# Patient Record
Sex: FEMALE | Race: WHITE | Employment: OTHER | URBAN - METROPOLITAN AREA
[De-identification: names, ages, dates, MRNs, and addresses within clinical notes are randomized per-mention and may not be internally consistent; named-entity substitution may affect disease eponyms.]

---

## 2017-05-16 ENCOUNTER — HOSPITAL ENCOUNTER (OUTPATIENT)
Dept: MAMMOGRAPHY | Age: 77
Discharge: HOME | End: 2017-05-16
Attending: NURSE PRACTITIONER | Admitting: NURSE PRACTITIONER

## 2017-05-16 DIAGNOSIS — C50.911 MALIGNANT NEOPLASM OF RIGHT FEMALE BREAST, UNSPECIFIED SITE OF BREAST: ICD-10-CM

## 2017-10-03 ENCOUNTER — HOSPITAL ENCOUNTER (OUTPATIENT)
Dept: NUCLEAR MEDICINE | Age: 77
Discharge: HOME | End: 2017-10-03
Attending: NURSE PRACTITIONER | Admitting: NURSE PRACTITIONER

## 2017-10-03 DIAGNOSIS — R06.02 SHORTNESS OF BREATH: ICD-10-CM

## 2017-10-03 DIAGNOSIS — R79.89 POSITIVE D DIMER: ICD-10-CM

## 2017-10-03 DIAGNOSIS — R79.89 OTHER SPECIFIED ABNORMAL FINDINGS OF BLOOD CHEMISTRY: ICD-10-CM

## 2017-10-03 DIAGNOSIS — R00.2 HEART PALPITATIONS: ICD-10-CM

## 2017-10-03 RX ADMIN — Medication 22.2 MILLICURIE: at 13:38

## 2017-10-03 RX ADMIN — Medication 5.3 MILLICURIE: at 13:38

## 2018-01-25 ENCOUNTER — OFFICE VISIT (OUTPATIENT)
Dept: ORTHOPEDIC SURGERY | Age: 78
End: 2018-01-25

## 2018-01-25 VITALS
HEIGHT: 62 IN | HEART RATE: 66 BPM | DIASTOLIC BLOOD PRESSURE: 100 MMHG | WEIGHT: 158 LBS | SYSTOLIC BLOOD PRESSURE: 152 MMHG | BODY MASS INDEX: 29.08 KG/M2

## 2018-01-25 DIAGNOSIS — R52 PAIN: ICD-10-CM

## 2018-01-25 DIAGNOSIS — M17.0 PRIMARY OSTEOARTHRITIS OF BOTH KNEES: Primary | ICD-10-CM

## 2018-01-25 PROBLEM — M17.12 PRIMARY OSTEOARTHRITIS OF LEFT KNEE: Status: ACTIVE | Noted: 2018-01-25

## 2018-01-25 PROCEDURE — 4040F PNEUMOC VAC/ADMIN/RCVD: CPT | Performed by: ORTHOPAEDIC SURGERY

## 2018-01-25 PROCEDURE — 1036F TOBACCO NON-USER: CPT | Performed by: ORTHOPAEDIC SURGERY

## 2018-01-25 PROCEDURE — 99203 OFFICE O/P NEW LOW 30 MIN: CPT | Performed by: ORTHOPAEDIC SURGERY

## 2018-01-25 PROCEDURE — G8419 CALC BMI OUT NRM PARAM NOF/U: HCPCS | Performed by: ORTHOPAEDIC SURGERY

## 2018-01-25 PROCEDURE — G8427 DOCREV CUR MEDS BY ELIG CLIN: HCPCS | Performed by: ORTHOPAEDIC SURGERY

## 2018-01-25 PROCEDURE — 1123F ACP DISCUSS/DSCN MKR DOCD: CPT | Performed by: ORTHOPAEDIC SURGERY

## 2018-01-25 PROCEDURE — G8400 PT W/DXA NO RESULTS DOC: HCPCS | Performed by: ORTHOPAEDIC SURGERY

## 2018-01-25 PROCEDURE — 1090F PRES/ABSN URINE INCON ASSESS: CPT | Performed by: ORTHOPAEDIC SURGERY

## 2018-01-25 PROCEDURE — G8484 FLU IMMUNIZE NO ADMIN: HCPCS | Performed by: ORTHOPAEDIC SURGERY

## 2018-01-25 RX ORDER — ESCITALOPRAM OXALATE 10 MG/1
10 TABLET ORAL
Status: ON HOLD | COMMUNITY
Start: 2017-12-04 | End: 2019-04-30

## 2018-01-25 RX ORDER — IBUPROFEN 200 MG
TABLET ORAL
COMMUNITY
End: 2019-04-24

## 2018-01-25 RX ORDER — TRAZODONE HYDROCHLORIDE 100 MG/1
100 TABLET ORAL NIGHTLY
COMMUNITY

## 2018-01-25 RX ORDER — LORAZEPAM 0.5 MG/1
0.5 TABLET ORAL
COMMUNITY
Start: 2017-11-30

## 2018-01-25 RX ORDER — FLUTICASONE PROPIONATE 50 MCG
2 SPRAY, SUSPENSION (ML) NASAL
COMMUNITY
Start: 2017-07-14

## 2018-01-25 RX ORDER — PROPRANOLOL HYDROCHLORIDE 10 MG/1
TABLET ORAL
COMMUNITY
Start: 2018-01-19

## 2018-01-25 RX ORDER — ASPIRIN 81 MG/1
81 TABLET ORAL
Status: ON HOLD | COMMUNITY
End: 2019-05-01 | Stop reason: HOSPADM

## 2018-01-25 RX ORDER — CETIRIZINE HYDROCHLORIDE 10 MG/1
TABLET ORAL
COMMUNITY
Start: 2017-08-15

## 2018-01-25 RX ORDER — ALENDRONATE SODIUM 70 MG/1
70 TABLET ORAL
COMMUNITY
Start: 2017-07-11 | End: 2019-04-24

## 2018-01-25 RX ORDER — LISINOPRIL AND HYDROCHLOROTHIAZIDE 12.5; 1 MG/1; MG/1
TABLET ORAL
Status: ON HOLD | COMMUNITY
Start: 2017-12-29 | End: 2019-05-02 | Stop reason: HOSPADM

## 2018-01-25 NOTE — PROGRESS NOTES
noted.  No fractures are identified. Impression:  Encounter Diagnoses   Name Primary?  Pain Yes    Primary osteoarthritis of left knee        Office Procedures:  Orders Placed This Encounter   Procedures    XR KNEE LEFT (MIN 4 VIEWS)     [id number]     Order Specific Question:   Reason for exam:     Answer:   Pain       Treatment Plan:  I discussed with the patient the nature of osteoarthritis of the knee. We talked about treatment of arthritis and the various options that are involved with this. The patient understands that the treatments can vary from essentially doing nothing to a total joint replacement arthroplasty for arthritis. I then went on to describe the utilization of glucosamine and chondroitin sulfate as a joint nutrition product. We talked about the fact that this is essentially a joint vitamin with typically minimal side effects. We also talked about utilization of prescription over-the-counter anti-inflammatory medications as the next option. We also discussed the possibility of brace wear or orthotic wear if the patient has significant varus alignment. We then went on to discuss the possibility of Visco supplementation with hyaluronate products. We talked about the typical course of this type of treatment and the fact that often times in the treatment for significant arthritis, this is successful less than half the time. We also talked about the corticosteroid injections and the fact that this can give a brief window of relief, but does not cure the problem; in fact, the pain often has a rebound effect in 6-10 weeks after the steroid has worn off. We also discussed arthroscopy surgery in attempts to debride the joint, but the fact that this is relatively unreliable treatment in the face of significant arthritis. It can occasionally be used, particularly if there is significant meniscus pathology.  Lastly we discussed total joint replacement arthroplasty as the final and definitive step in

## 2018-01-26 ENCOUNTER — TELEPHONE (OUTPATIENT)
Dept: ORTHOPEDIC SURGERY | Age: 78
End: 2018-01-26

## 2018-02-01 ENCOUNTER — NURSE ONLY (OUTPATIENT)
Dept: ORTHOPEDIC SURGERY | Age: 78
End: 2018-02-01

## 2018-02-01 DIAGNOSIS — M17.0 PRIMARY OSTEOARTHRITIS OF BOTH KNEES: Primary | ICD-10-CM

## 2018-02-01 DIAGNOSIS — R52 PAIN: ICD-10-CM

## 2018-02-01 PROCEDURE — 20611 DRAIN/INJ JOINT/BURSA W/US: CPT | Performed by: PHYSICIAN ASSISTANT

## 2018-02-01 NOTE — PROGRESS NOTES
Subjective:      Patient ID: Helen Baez is a 68 y.o. female.     HPI    Review of Systems    Objective:   Physical Exam    Assessment:      ***      Plan:      ***

## 2018-02-01 NOTE — PROGRESS NOTES
2/1/18 4:03 PM         NDC: 93896772476    Lot Number: [id number]    Comments: 2ML X 2 RT AND LT KNEE EUFLEXXA  EXP 11/26/18

## 2018-02-01 NOTE — PROGRESS NOTES
Diagnosis: Left and right knee osteoarthritis. Procedure: Left and right knee Visco supplementation #1    The patient is symptomatic from osteoarthritis of the left and right knee joint with documented radiological signs of arthritis. The patient has also failed 3 months of conservative treatment including home exercise, education, Tylenol and/or NSAIDs use. The patient was offered a Visco supplementation today. Risks, benefits, and alternatives to the injections were discussed in detail with the patient. The risks discussed included but are not limited to infection, skin reactions, hot swollen joints, and anaphylaxis. The patient gave verbal informed consent for the injection. The patient's skin was prepped with  3 sterile gauze  pads soaked with alcohol solution and the knee joint was injected with 2 mL of Euflexxa intra-articularly under sterile conditions. Technique: Under sterile conditions a [employer] ultrasound unit with a variable frequency (6.0-15.0 MHz) linear transducer was used to localize the placement of a 22-gauge needle into the knee joint. Findings: Successful needle placement for intra-articular Visco supplementation injection. Final images were taken and saved for permanent record. The patient tolerated the injection reasonably well. The patient was given instructions to ice the kne and avoid strenuous activities for 24-48 hours. The patient was instructed to call the office immediately if there is increased pain, redness, warmth, fever, or chills. We will see the patient back in one week for their second injection.

## 2018-02-08 ENCOUNTER — NURSE ONLY (OUTPATIENT)
Dept: ORTHOPEDIC SURGERY | Age: 78
End: 2018-02-08

## 2018-02-08 DIAGNOSIS — M17.0 PRIMARY OSTEOARTHRITIS OF BOTH KNEES: Primary | ICD-10-CM

## 2018-02-08 PROCEDURE — 20611 DRAIN/INJ JOINT/BURSA W/US: CPT | Performed by: PHYSICIAN ASSISTANT

## 2018-02-08 NOTE — PROGRESS NOTES
2/8/18 4:04 PM         NDC: [id number]    Lot Number: [id number]    Comments: 2ML X 2 RT AND LT KNEE EUFLEXXA EXP 12/2/18

## 2018-02-08 NOTE — PROGRESS NOTES
Diagnosis: Left and right knee osteoarthritis. Procedure: Left and right knee Visco supplementation injection 2    The patient returns today for their second Euflexxa injection in the left and right knee. The risks, benefits, and complications of the injections were again discussed in detail with the patient. The risks discussed included but are not limited to infection, skin reactions, hot swollen joints, and anaphylaxis. The patient gave verbal informed consent for the injection. The patient skin was prepped with 3 sterile gauze pads soaked with alcohol solution and the knee joint was injected with 2 mL of Euflexxa intra-articularly under sterile conditions . Technique: Under sterile conditions a [employer] ultrasound unit with a variable frequency (6.0-15.0 MHz) linear transducer was used to localize the placement of a 22-gauge needle into the laith joint. Findings: Successful needle placement for intra-articular Visco supplementation injection. Final images were taken and saved for permanent record. The patient tolerated the injection reasonably well. The patient was given instructions to ice the the knee and avoid strenuous activities for 24-48 hours. The patient was instructed to call the office immediately if there is increased pain, redness, warmth, fever, or chills. We will see the patient back in one week for the third injection.

## 2018-02-15 ENCOUNTER — NURSE ONLY (OUTPATIENT)
Dept: ORTHOPEDIC SURGERY | Age: 78
End: 2018-02-15

## 2018-02-15 DIAGNOSIS — M17.0 PRIMARY OSTEOARTHRITIS OF BOTH KNEES: Primary | ICD-10-CM

## 2018-02-15 PROCEDURE — 20611 DRAIN/INJ JOINT/BURSA W/US: CPT | Performed by: PHYSICIAN ASSISTANT

## 2018-03-14 RX ORDER — MELOXICAM 15 MG/1
TABLET ORAL
Qty: 30 TABLET | Refills: 3 | Status: SHIPPED | OUTPATIENT
Start: 2018-03-14 | End: 2018-07-25 | Stop reason: SDUPTHER

## 2018-04-11 ENCOUNTER — OFFICE VISIT (OUTPATIENT)
Dept: ORTHOPEDIC SURGERY | Age: 78
End: 2018-04-11

## 2018-04-11 VITALS — WEIGHT: 160 LBS | HEIGHT: 63 IN | BODY MASS INDEX: 28.35 KG/M2

## 2018-04-11 DIAGNOSIS — R52 PAIN: Primary | ICD-10-CM

## 2018-04-11 DIAGNOSIS — M19.012 PRIMARY OSTEOARTHRITIS OF LEFT SHOULDER: ICD-10-CM

## 2018-04-11 PROCEDURE — G8428 CUR MEDS NOT DOCUMENT: HCPCS | Performed by: PHYSICIAN ASSISTANT

## 2018-04-11 PROCEDURE — 1123F ACP DISCUSS/DSCN MKR DOCD: CPT | Performed by: PHYSICIAN ASSISTANT

## 2018-04-11 PROCEDURE — 1090F PRES/ABSN URINE INCON ASSESS: CPT | Performed by: PHYSICIAN ASSISTANT

## 2018-04-11 PROCEDURE — 99213 OFFICE O/P EST LOW 20 MIN: CPT | Performed by: PHYSICIAN ASSISTANT

## 2018-04-11 PROCEDURE — G8419 CALC BMI OUT NRM PARAM NOF/U: HCPCS | Performed by: PHYSICIAN ASSISTANT

## 2018-04-11 PROCEDURE — 1036F TOBACCO NON-USER: CPT | Performed by: PHYSICIAN ASSISTANT

## 2018-04-11 PROCEDURE — 4040F PNEUMOC VAC/ADMIN/RCVD: CPT | Performed by: PHYSICIAN ASSISTANT

## 2018-04-11 PROCEDURE — G8400 PT W/DXA NO RESULTS DOC: HCPCS | Performed by: PHYSICIAN ASSISTANT

## 2018-05-17 ENCOUNTER — HOSPITAL ENCOUNTER (OUTPATIENT)
Dept: MAMMOGRAPHY | Age: 78
Discharge: HOME | End: 2018-05-17
Attending: NURSE PRACTITIONER | Admitting: NURSE PRACTITIONER

## 2018-05-17 DIAGNOSIS — Z85.3 PERSONAL HISTORY OF MALIGNANT NEOPLASM OF BREAST: ICD-10-CM

## 2018-07-25 DIAGNOSIS — M17.0 PRIMARY OSTEOARTHRITIS OF BOTH KNEES: Primary | ICD-10-CM

## 2018-07-25 RX ORDER — MELOXICAM 15 MG/1
TABLET ORAL
Qty: 90 TABLET | Refills: 0 | Status: SHIPPED | OUTPATIENT
Start: 2018-07-25 | End: 2018-10-20 | Stop reason: SDUPTHER

## 2018-10-20 DIAGNOSIS — M17.0 PRIMARY OSTEOARTHRITIS OF BOTH KNEES: ICD-10-CM

## 2018-10-22 RX ORDER — MELOXICAM 15 MG/1
TABLET ORAL
Qty: 90 TABLET | Refills: 0 | Status: SHIPPED | OUTPATIENT
Start: 2018-10-22 | End: 2019-02-18 | Stop reason: SDUPTHER

## 2019-02-07 ENCOUNTER — OFFICE VISIT (OUTPATIENT)
Dept: ORTHOPEDIC SURGERY | Age: 79
End: 2019-02-07
Payer: MEDICARE

## 2019-02-07 VITALS
DIASTOLIC BLOOD PRESSURE: 94 MMHG | BODY MASS INDEX: 28.35 KG/M2 | WEIGHT: 160 LBS | HEART RATE: 78 BPM | HEIGHT: 63 IN | SYSTOLIC BLOOD PRESSURE: 163 MMHG

## 2019-02-07 DIAGNOSIS — M17.12 PRIMARY OSTEOARTHRITIS OF LEFT KNEE: Primary | ICD-10-CM

## 2019-02-07 DIAGNOSIS — M25.562 LEFT KNEE PAIN, UNSPECIFIED CHRONICITY: ICD-10-CM

## 2019-02-07 PROCEDURE — 1036F TOBACCO NON-USER: CPT | Performed by: ORTHOPAEDIC SURGERY

## 2019-02-07 PROCEDURE — G8484 FLU IMMUNIZE NO ADMIN: HCPCS | Performed by: ORTHOPAEDIC SURGERY

## 2019-02-07 PROCEDURE — 1123F ACP DISCUSS/DSCN MKR DOCD: CPT | Performed by: ORTHOPAEDIC SURGERY

## 2019-02-07 PROCEDURE — L1830 KO IMMOB CANVAS LONG PRE OTS: HCPCS | Performed by: ORTHOPAEDIC SURGERY

## 2019-02-07 PROCEDURE — 1090F PRES/ABSN URINE INCON ASSESS: CPT | Performed by: ORTHOPAEDIC SURGERY

## 2019-02-07 PROCEDURE — G8400 PT W/DXA NO RESULTS DOC: HCPCS | Performed by: ORTHOPAEDIC SURGERY

## 2019-02-07 PROCEDURE — 1101F PT FALLS ASSESS-DOCD LE1/YR: CPT | Performed by: ORTHOPAEDIC SURGERY

## 2019-02-07 PROCEDURE — G8427 DOCREV CUR MEDS BY ELIG CLIN: HCPCS | Performed by: ORTHOPAEDIC SURGERY

## 2019-02-07 PROCEDURE — 99214 OFFICE O/P EST MOD 30 MIN: CPT | Performed by: ORTHOPAEDIC SURGERY

## 2019-02-07 PROCEDURE — 4040F PNEUMOC VAC/ADMIN/RCVD: CPT | Performed by: ORTHOPAEDIC SURGERY

## 2019-02-07 PROCEDURE — G8419 CALC BMI OUT NRM PARAM NOF/U: HCPCS | Performed by: ORTHOPAEDIC SURGERY

## 2019-02-12 ENCOUNTER — HOSPITAL ENCOUNTER (OUTPATIENT)
Dept: PHYSICAL THERAPY | Age: 79
Setting detail: THERAPIES SERIES
Discharge: HOME | End: 2019-02-12
Payer: MEDICARE

## 2019-02-12 PROCEDURE — G8978 MOBILITY CURRENT STATUS: HCPCS

## 2019-02-12 PROCEDURE — 97110 THERAPEUTIC EXERCISES: CPT

## 2019-02-12 PROCEDURE — G8979 MOBILITY GOAL STATUS: HCPCS

## 2019-02-12 PROCEDURE — 97161 PT EVAL LOW COMPLEX 20 MIN: CPT

## 2019-02-18 DIAGNOSIS — M17.0 PRIMARY OSTEOARTHRITIS OF BOTH KNEES: ICD-10-CM

## 2019-02-21 RX ORDER — MELOXICAM 15 MG/1
TABLET ORAL
Qty: 90 TABLET | Refills: 0 | Status: ON HOLD | OUTPATIENT
Start: 2019-02-21 | End: 2019-05-01 | Stop reason: HOSPADM

## 2019-02-25 DIAGNOSIS — M25.562 LEFT KNEE PAIN, UNSPECIFIED CHRONICITY: Primary | ICD-10-CM

## 2019-04-04 ENCOUNTER — TELEPHONE (OUTPATIENT)
Dept: ORTHOPEDIC SURGERY | Age: 79
End: 2019-04-04

## 2019-04-11 ENCOUNTER — HOSPITAL ENCOUNTER (OUTPATIENT)
Dept: PREADMISSION TESTING | Age: 79
Discharge: HOME | End: 2019-04-15
Payer: MEDICARE

## 2019-04-11 LAB
ABO/RH: NORMAL
ALBUMIN SERPL-MCNC: 4.5 G/DL (ref 3.4–5)
ANION GAP SERPL CALCULATED.3IONS-SCNC: 11 MMOL/L (ref 3–16)
ANTIBODY SCREEN: NORMAL
APTT: 28.8 SEC (ref 26–36)
BASOPHILS ABSOLUTE: 0 K/UL (ref 0–0.2)
BASOPHILS RELATIVE PERCENT: 1 %
BILIRUBIN URINE: NEGATIVE
BLOOD, URINE: NEGATIVE
BUN BLDV-MCNC: 14 MG/DL (ref 7–20)
CALCIUM SERPL-MCNC: 9.7 MG/DL (ref 8.3–10.6)
CHLORIDE BLD-SCNC: 95 MMOL/L (ref 99–110)
CLARITY: CLEAR
CO2: 30 MMOL/L (ref 21–32)
COLOR: YELLOW
CREAT SERPL-MCNC: 0.7 MG/DL (ref 0.6–1.2)
EKG ATRIAL RATE: 81 BPM
EKG DIAGNOSIS: NORMAL
EKG P AXIS: 37 DEGREES
EKG P-R INTERVAL: 168 MS
EKG Q-T INTERVAL: 394 MS
EKG QRS DURATION: 84 MS
EKG QTC CALCULATION (BAZETT): 457 MS
EKG R AXIS: -32 DEGREES
EKG T AXIS: 2 DEGREES
EKG VENTRICULAR RATE: 81 BPM
EOSINOPHILS ABSOLUTE: 0.1 K/UL (ref 0–0.6)
EOSINOPHILS RELATIVE PERCENT: 2.6 %
GFR AFRICAN AMERICAN: >60
GFR NON-AFRICAN AMERICAN: >60
GLUCOSE BLD-MCNC: 119 MG/DL (ref 70–99)
GLUCOSE URINE: NEGATIVE MG/DL
HCT VFR BLD CALC: 40.7 % (ref 36–48)
HEMOGLOBIN: 13.5 G/DL (ref 12–16)
INR BLD: 1.01 (ref 0.86–1.14)
KETONES, URINE: NEGATIVE MG/DL
LEUKOCYTE ESTERASE, URINE: NEGATIVE
LYMPHOCYTES ABSOLUTE: 1 K/UL (ref 1–5.1)
LYMPHOCYTES RELATIVE PERCENT: 18.7 %
MCH RBC QN AUTO: 30.1 PG (ref 26–34)
MCHC RBC AUTO-ENTMCNC: 33.2 G/DL (ref 31–36)
MCV RBC AUTO: 90.7 FL (ref 80–100)
MICROSCOPIC EXAMINATION: NORMAL
MONOCYTES ABSOLUTE: 0.5 K/UL (ref 0–1.3)
MONOCYTES RELATIVE PERCENT: 10.2 %
NEUTROPHILS ABSOLUTE: 3.5 K/UL (ref 1.7–7.7)
NEUTROPHILS RELATIVE PERCENT: 67.5 %
NITRITE, URINE: NEGATIVE
PDW BLD-RTO: 14.1 % (ref 12.4–15.4)
PH UA: 7.5 (ref 5–8)
PLATELET # BLD: 292 K/UL (ref 135–450)
PMV BLD AUTO: 8.1 FL (ref 5–10.5)
POTASSIUM SERPL-SCNC: 4.2 MMOL/L (ref 3.5–5.1)
PROTEIN UA: NEGATIVE MG/DL
PROTHROMBIN TIME: 11.5 SEC (ref 9.8–13)
RBC # BLD: 4.48 M/UL (ref 4–5.2)
SODIUM BLD-SCNC: 136 MMOL/L (ref 136–145)
SPECIFIC GRAVITY UA: 1.01 (ref 1–1.03)
TRANSFERRIN: 346 MG/DL (ref 200–360)
URINE TYPE: NORMAL
UROBILINOGEN, URINE: 0.2 E.U./DL
WBC # BLD: 5.1 K/UL (ref 4–11)

## 2019-04-11 PROCEDURE — 82040 ASSAY OF SERUM ALBUMIN: CPT

## 2019-04-11 PROCEDURE — 83036 HEMOGLOBIN GLYCOSYLATED A1C: CPT

## 2019-04-11 PROCEDURE — 86900 BLOOD TYPING SEROLOGIC ABO: CPT

## 2019-04-11 PROCEDURE — 84466 ASSAY OF TRANSFERRIN: CPT

## 2019-04-11 PROCEDURE — 81003 URINALYSIS AUTO W/O SCOPE: CPT

## 2019-04-11 PROCEDURE — 36415 COLL VENOUS BLD VENIPUNCTURE: CPT

## 2019-04-11 PROCEDURE — 85025 COMPLETE CBC W/AUTO DIFF WBC: CPT

## 2019-04-11 PROCEDURE — 93005 ELECTROCARDIOGRAM TRACING: CPT

## 2019-04-11 PROCEDURE — 85730 THROMBOPLASTIN TIME PARTIAL: CPT

## 2019-04-11 PROCEDURE — 86850 RBC ANTIBODY SCREEN: CPT

## 2019-04-11 PROCEDURE — 87086 URINE CULTURE/COLONY COUNT: CPT

## 2019-04-11 PROCEDURE — 86901 BLOOD TYPING SEROLOGIC RH(D): CPT

## 2019-04-11 PROCEDURE — 85610 PROTHROMBIN TIME: CPT

## 2019-04-11 PROCEDURE — 80048 BASIC METABOLIC PNL TOTAL CA: CPT

## 2019-04-12 LAB
ESTIMATED AVERAGE GLUCOSE: 137 MG/DL
HBA1C MFR BLD: 6.4 %
URINE CULTURE, ROUTINE: NORMAL

## 2019-04-16 ENCOUNTER — HOSPITAL ENCOUNTER (OUTPATIENT)
Dept: CT IMAGING | Age: 79
Discharge: HOME | End: 2019-04-16
Payer: MEDICARE

## 2019-04-16 ENCOUNTER — TELEPHONE (OUTPATIENT)
Dept: ORTHOPEDIC SURGERY | Age: 79
End: 2019-04-16

## 2019-04-16 DIAGNOSIS — M25.562 LEFT KNEE PAIN, UNSPECIFIED CHRONICITY: ICD-10-CM

## 2019-04-16 PROCEDURE — 73700 CT LOWER EXTREMITY W/O DYE: CPT

## 2019-04-24 NOTE — PROGRESS NOTES
Obstructive Sleep Apnea (BETHANY) Screening     Patient:  Isauro Harris    YOB: 1940      Medical Record #:  [id number]                     Date:  4/24/2019     1. Are you a loud and/or regular snorer? [x]  Yes       [] No    2. Have you been observed to gasp or stop breathing during sleep? []  Yes       [x] No    3. Do you feel tired or groggy upon awakening or do you awaken with a headache?           []  Yes       [x] No    4. Are you often tired or fatigued during the wake time hours? [x]  Yes       [] No    5. Do you fall asleep sitting, reading, watching TV or driving? [x]  Yes       [] No    6. Do you often have problems with memory or concentration? []  Yes       [x] No    **If patient's score is ? 3 they are considered high risk for BETHANY. Notify the anesthesiologist of the high risk and document in focus note. Note:  If the patient's BMI is more than 35 kg m¯² , has neck circumference > 40 cm, and/or high blood pressure the risk is greater (© American [employer]nea Association, 2006).

## 2019-04-28 ENCOUNTER — ANESTHESIA EVENT (OUTPATIENT)
Dept: OPERATING ROOM | Age: 79
End: 2019-04-28
Payer: MEDICARE

## 2019-04-30 ENCOUNTER — HOSPITAL ENCOUNTER (OUTPATIENT)
Age: 79
Discharge: HOME | End: 2019-05-02
Attending: ORTHOPAEDIC SURGERY | Admitting: ORTHOPAEDIC SURGERY
Payer: MEDICARE

## 2019-04-30 ENCOUNTER — ANESTHESIA (OUTPATIENT)
Dept: OPERATING ROOM | Age: 79
End: 2019-04-30
Payer: MEDICARE

## 2019-04-30 VITALS — DIASTOLIC BLOOD PRESSURE: 58 MMHG | OXYGEN SATURATION: 79 % | SYSTOLIC BLOOD PRESSURE: 92 MMHG

## 2019-04-30 DIAGNOSIS — M17.12 PRIMARY OSTEOARTHRITIS OF LEFT KNEE: Primary | ICD-10-CM

## 2019-04-30 DIAGNOSIS — Z96.652 STATUS POST TOTAL KNEE REPLACEMENT NOT USING CEMENT, LEFT: ICD-10-CM

## 2019-04-30 LAB
ABO/RH: NORMAL
ANTIBODY SCREEN: NORMAL
GLUCOSE BLD-MCNC: 134 MG/DL (ref 70–99)
GLUCOSE BLD-MCNC: 140 MG/DL (ref 70–99)
PERFORMED ON: ABNORMAL
PERFORMED ON: ABNORMAL

## 2019-04-30 PROCEDURE — 88305 TISSUE EXAM BY PATHOLOGIST: CPT

## 2019-04-30 PROCEDURE — 86900 BLOOD TYPING SEROLOGIC ABO: CPT

## 2019-04-30 PROCEDURE — C1713 ANCHOR/SCREW BN/BN,TIS/BN: HCPCS | Performed by: ORTHOPAEDIC SURGERY

## 2019-04-30 PROCEDURE — 6360000002 HC RX W HCPCS: Performed by: ANESTHESIOLOGY

## 2019-04-30 PROCEDURE — 97166 OT EVAL MOD COMPLEX 45 MIN: CPT

## 2019-04-30 PROCEDURE — 6360000002 HC RX W HCPCS: Performed by: NURSE ANESTHETIST, CERTIFIED REGISTERED

## 2019-04-30 PROCEDURE — 2580000003 HC RX 258: Performed by: PHYSICIAN ASSISTANT

## 2019-04-30 PROCEDURE — 64447 NJX AA&/STRD FEMORAL NRV IMG: CPT | Performed by: ANESTHESIOLOGY

## 2019-04-30 PROCEDURE — 6360000002 HC RX W HCPCS: Performed by: ORTHOPAEDIC SURGERY

## 2019-04-30 PROCEDURE — 2500000003 HC RX 250 WO HCPCS: Performed by: NURSE ANESTHETIST, CERTIFIED REGISTERED

## 2019-04-30 PROCEDURE — 6360000002 HC RX W HCPCS: Performed by: PHYSICIAN ASSISTANT

## 2019-04-30 PROCEDURE — C1776 JOINT DEVICE (IMPLANTABLE): HCPCS | Performed by: ORTHOPAEDIC SURGERY

## 2019-04-30 PROCEDURE — 2580000003 HC RX 258: Performed by: ANESTHESIOLOGY

## 2019-04-30 PROCEDURE — 6370000000 HC RX 637 (ALT 250 FOR IP): Performed by: PHYSICIAN ASSISTANT

## 2019-04-30 PROCEDURE — 97116 GAIT TRAINING THERAPY: CPT

## 2019-04-30 PROCEDURE — 3600000015 HC SURGERY LEVEL 5 ADDTL 15MIN: Performed by: ORTHOPAEDIC SURGERY

## 2019-04-30 PROCEDURE — 86901 BLOOD TYPING SEROLOGIC RH(D): CPT

## 2019-04-30 PROCEDURE — 3700000001 HC ADD 15 MINUTES (ANESTHESIA): Performed by: ORTHOPAEDIC SURGERY

## 2019-04-30 PROCEDURE — 2500000003 HC RX 250 WO HCPCS: Performed by: ORTHOPAEDIC SURGERY

## 2019-04-30 PROCEDURE — 97535 SELF CARE MNGMENT TRAINING: CPT

## 2019-04-30 PROCEDURE — 1200000000 HC SEMI PRIVATE

## 2019-04-30 PROCEDURE — 2500000003 HC RX 250 WO HCPCS: Performed by: PHYSICIAN ASSISTANT

## 2019-04-30 PROCEDURE — 6370000000 HC RX 637 (ALT 250 FOR IP): Performed by: ANESTHESIOLOGY

## 2019-04-30 PROCEDURE — 94761 N-INVAS EAR/PLS OXIMETRY MLT: CPT

## 2019-04-30 PROCEDURE — 7100000000 HC PACU RECOVERY - FIRST 15 MIN: Performed by: ORTHOPAEDIC SURGERY

## 2019-04-30 PROCEDURE — 2700000000 HC OXYGEN THERAPY PER DAY

## 2019-04-30 PROCEDURE — 97530 THERAPEUTIC ACTIVITIES: CPT

## 2019-04-30 PROCEDURE — 97162 PT EVAL MOD COMPLEX 30 MIN: CPT

## 2019-04-30 PROCEDURE — 97110 THERAPEUTIC EXERCISES: CPT

## 2019-04-30 PROCEDURE — 2720000010 HC SURG SUPPLY STERILE: Performed by: ORTHOPAEDIC SURGERY

## 2019-04-30 PROCEDURE — 2580000003 HC RX 258: Performed by: ORTHOPAEDIC SURGERY

## 2019-04-30 PROCEDURE — 7100000001 HC PACU RECOVERY - ADDTL 15 MIN: Performed by: ORTHOPAEDIC SURGERY

## 2019-04-30 PROCEDURE — 88311 DECALCIFY TISSUE: CPT

## 2019-04-30 PROCEDURE — 86850 RBC ANTIBODY SCREEN: CPT

## 2019-04-30 PROCEDURE — C9290 INJ, BUPIVACAINE LIPOSOME: HCPCS | Performed by: ORTHOPAEDIC SURGERY

## 2019-04-30 PROCEDURE — 2709999900 HC NON-CHARGEABLE SUPPLY: Performed by: ORTHOPAEDIC SURGERY

## 2019-04-30 PROCEDURE — 3600000005 HC SURGERY LEVEL 5 BASE: Performed by: ORTHOPAEDIC SURGERY

## 2019-04-30 PROCEDURE — 3700000000 HC ANESTHESIA ATTENDED CARE: Performed by: ORTHOPAEDIC SURGERY

## 2019-04-30 DEVICE — INSERT TIB BEAR SZ 4 THK11MM KNEE X3 CNDYL STABILIZING: Type: IMPLANTABLE DEVICE | Site: KNEE | Status: FUNCTIONAL

## 2019-04-30 DEVICE — CEMENT BNE 20ML 41GM FULL DOSE PMMA W/ TOBRA M VISC RADPQ: Type: IMPLANTABLE DEVICE | Site: KNEE | Status: FUNCTIONAL

## 2019-04-30 DEVICE — BASEPLATE TIB SZ 4 KNEE TRITANIUM CEM PRI LO PROF TRIATHLON: Type: IMPLANTABLE DEVICE | Site: KNEE | Status: FUNCTIONAL

## 2019-04-30 DEVICE — IMPLANTABLE DEVICE: Type: IMPLANTABLE DEVICE | Site: KNEE | Status: FUNCTIONAL

## 2019-04-30 DEVICE — COMPONENT PAT DIA33MM THK9MM KNEE X3 SYMMETRIC TRIATHLON: Type: IMPLANTABLE DEVICE | Site: KNEE | Status: FUNCTIONAL

## 2019-04-30 RX ORDER — ACETAMINOPHEN 500 MG
1000 TABLET ORAL ONCE
Status: COMPLETED | OUTPATIENT
Start: 2019-04-30 | End: 2019-04-30

## 2019-04-30 RX ORDER — ONDANSETRON 2 MG/ML
4 INJECTION INTRAMUSCULAR; INTRAVENOUS EVERY 6 HOURS PRN
Status: DISCONTINUED | OUTPATIENT
Start: 2019-04-30 | End: 2019-05-02 | Stop reason: HOSPADM

## 2019-04-30 RX ORDER — DEXTROSE MONOHYDRATE 25 G/50ML
12.5 INJECTION, SOLUTION INTRAVENOUS PRN
Status: DISCONTINUED | OUTPATIENT
Start: 2019-04-30 | End: 2019-05-02 | Stop reason: HOSPADM

## 2019-04-30 RX ORDER — LIDOCAINE HYDROCHLORIDE 10 MG/ML
INJECTION, SOLUTION INFILTRATION; PERINEURAL PRN
Status: DISCONTINUED | OUTPATIENT
Start: 2019-04-30 | End: 2019-04-30 | Stop reason: SDUPTHER

## 2019-04-30 RX ORDER — OXYCODONE HYDROCHLORIDE AND ACETAMINOPHEN 5; 325 MG/1; MG/1
2 TABLET ORAL PRN
Status: DISCONTINUED | OUTPATIENT
Start: 2019-04-30 | End: 2019-04-30 | Stop reason: HOSPADM

## 2019-04-30 RX ORDER — GLYCOPYRROLATE 0.2 MG/ML
INJECTION INTRAMUSCULAR; INTRAVENOUS PRN
Status: DISCONTINUED | OUTPATIENT
Start: 2019-04-30 | End: 2019-04-30 | Stop reason: SDUPTHER

## 2019-04-30 RX ORDER — LISINOPRIL AND HYDROCHLOROTHIAZIDE 12.5; 1 MG/1; MG/1
1 TABLET ORAL DAILY
Status: DISCONTINUED | OUTPATIENT
Start: 2019-04-30 | End: 2019-04-30 | Stop reason: CLARIF

## 2019-04-30 RX ORDER — MEPERIDINE HYDROCHLORIDE 50 MG/ML
12.5 INJECTION INTRAMUSCULAR; INTRAVENOUS; SUBCUTANEOUS EVERY 5 MIN PRN
Status: DISCONTINUED | OUTPATIENT
Start: 2019-04-30 | End: 2019-04-30 | Stop reason: HOSPADM

## 2019-04-30 RX ORDER — DEXTROSE MONOHYDRATE 50 MG/ML
100 INJECTION, SOLUTION INTRAVENOUS PRN
Status: DISCONTINUED | OUTPATIENT
Start: 2019-04-30 | End: 2019-05-02 | Stop reason: HOSPADM

## 2019-04-30 RX ORDER — CYCLOBENZAPRINE HCL 10 MG
10 TABLET ORAL 3 TIMES DAILY PRN
Status: DISCONTINUED | OUTPATIENT
Start: 2019-04-30 | End: 2019-05-02 | Stop reason: HOSPADM

## 2019-04-30 RX ORDER — SODIUM CHLORIDE, SODIUM LACTATE, POTASSIUM CHLORIDE, CALCIUM CHLORIDE 600; 310; 30; 20 MG/100ML; MG/100ML; MG/100ML; MG/100ML
INJECTION, SOLUTION INTRAVENOUS CONTINUOUS
Status: DISCONTINUED | OUTPATIENT
Start: 2019-04-30 | End: 2019-04-30

## 2019-04-30 RX ORDER — FLUTICASONE PROPIONATE 50 MCG
2 SPRAY, SUSPENSION (ML) NASAL DAILY
Status: DISCONTINUED | OUTPATIENT
Start: 2019-04-30 | End: 2019-05-02 | Stop reason: HOSPADM

## 2019-04-30 RX ORDER — HYDROCODONE BITARTRATE AND ACETAMINOPHEN 5; 325 MG/1; MG/1
1 TABLET ORAL EVERY 4 HOURS PRN
Status: DISCONTINUED | OUTPATIENT
Start: 2019-04-30 | End: 2019-05-02 | Stop reason: HOSPADM

## 2019-04-30 RX ORDER — FAMOTIDINE 20 MG/1
20 TABLET, FILM COATED ORAL 2 TIMES DAILY
Status: DISCONTINUED | OUTPATIENT
Start: 2019-04-30 | End: 2019-05-02 | Stop reason: HOSPADM

## 2019-04-30 RX ORDER — MIDAZOLAM HYDROCHLORIDE 1 MG/ML
INJECTION INTRAMUSCULAR; INTRAVENOUS PRN
Status: DISCONTINUED | OUTPATIENT
Start: 2019-04-30 | End: 2019-04-30 | Stop reason: SDUPTHER

## 2019-04-30 RX ORDER — PROPOFOL 10 MG/ML
INJECTION, EMULSION INTRAVENOUS PRN
Status: DISCONTINUED | OUTPATIENT
Start: 2019-04-30 | End: 2019-04-30 | Stop reason: SDUPTHER

## 2019-04-30 RX ORDER — HYDRALAZINE HYDROCHLORIDE 20 MG/ML
5 INJECTION INTRAMUSCULAR; INTRAVENOUS EVERY 10 MIN PRN
Status: DISCONTINUED | OUTPATIENT
Start: 2019-04-30 | End: 2019-04-30 | Stop reason: HOSPADM

## 2019-04-30 RX ORDER — PROPRANOLOL HYDROCHLORIDE 10 MG/1
10 TABLET ORAL DAILY
Status: DISCONTINUED | OUTPATIENT
Start: 2019-04-30 | End: 2019-05-02 | Stop reason: HOSPADM

## 2019-04-30 RX ORDER — SODIUM CHLORIDE 0.9 % (FLUSH) 0.9 %
10 SYRINGE (ML) INJECTION PRN
Status: DISCONTINUED | OUTPATIENT
Start: 2019-04-30 | End: 2019-04-30 | Stop reason: HOSPADM

## 2019-04-30 RX ORDER — DOCUSATE SODIUM 100 MG/1
100 CAPSULE, LIQUID FILLED ORAL 2 TIMES DAILY
Status: DISCONTINUED | OUTPATIENT
Start: 2019-04-30 | End: 2019-05-02 | Stop reason: HOSPADM

## 2019-04-30 RX ORDER — LORAZEPAM 0.5 MG/1
0.5 TABLET ORAL 2 TIMES DAILY PRN
Status: DISCONTINUED | OUTPATIENT
Start: 2019-04-30 | End: 2019-05-02 | Stop reason: HOSPADM

## 2019-04-30 RX ORDER — LABETALOL HYDROCHLORIDE 5 MG/ML
5 INJECTION, SOLUTION INTRAVENOUS EVERY 10 MIN PRN
Status: DISCONTINUED | OUTPATIENT
Start: 2019-04-30 | End: 2019-04-30 | Stop reason: HOSPADM

## 2019-04-30 RX ORDER — ESCITALOPRAM OXALATE 10 MG/1
10 TABLET ORAL DAILY
Status: DISCONTINUED | OUTPATIENT
Start: 2019-04-30 | End: 2019-04-30

## 2019-04-30 RX ORDER — PROPOFOL 10 MG/ML
INJECTION, EMULSION INTRAVENOUS CONTINUOUS PRN
Status: DISCONTINUED | OUTPATIENT
Start: 2019-04-30 | End: 2019-04-30 | Stop reason: SDUPTHER

## 2019-04-30 RX ORDER — HYDROCODONE BITARTRATE AND ACETAMINOPHEN 5; 325 MG/1; MG/1
2 TABLET ORAL EVERY 4 HOURS PRN
Status: DISCONTINUED | OUTPATIENT
Start: 2019-04-30 | End: 2019-05-02 | Stop reason: HOSPADM

## 2019-04-30 RX ORDER — HYDROCHLOROTHIAZIDE 25 MG/1
12.5 TABLET ORAL DAILY
Status: DISCONTINUED | OUTPATIENT
Start: 2019-04-30 | End: 2019-05-02

## 2019-04-30 RX ORDER — OXYCODONE HYDROCHLORIDE AND ACETAMINOPHEN 5; 325 MG/1; MG/1
1 TABLET ORAL PRN
Status: DISCONTINUED | OUTPATIENT
Start: 2019-04-30 | End: 2019-04-30 | Stop reason: HOSPADM

## 2019-04-30 RX ORDER — CETIRIZINE HYDROCHLORIDE 10 MG/1
10 TABLET ORAL DAILY PRN
Status: DISCONTINUED | OUTPATIENT
Start: 2019-04-30 | End: 2019-05-02 | Stop reason: HOSPADM

## 2019-04-30 RX ORDER — DEXAMETHASONE SODIUM PHOSPHATE 4 MG/ML
INJECTION, SOLUTION INTRA-ARTICULAR; INTRALESIONAL; INTRAMUSCULAR; INTRAVENOUS; SOFT TISSUE PRN
Status: DISCONTINUED | OUTPATIENT
Start: 2019-04-30 | End: 2019-04-30 | Stop reason: SDUPTHER

## 2019-04-30 RX ORDER — LIDOCAINE HYDROCHLORIDE 10 MG/ML
0.3 INJECTION, SOLUTION EPIDURAL; INFILTRATION; INTRACAUDAL; PERINEURAL
Status: DISCONTINUED | OUTPATIENT
Start: 2019-04-30 | End: 2019-04-30 | Stop reason: HOSPADM

## 2019-04-30 RX ORDER — KETOROLAC TROMETHAMINE 30 MG/ML
15 INJECTION, SOLUTION INTRAMUSCULAR; INTRAVENOUS EVERY 6 HOURS PRN
Status: DISCONTINUED | OUTPATIENT
Start: 2019-04-30 | End: 2019-05-02 | Stop reason: HOSPADM

## 2019-04-30 RX ORDER — ONDANSETRON 2 MG/ML
4 INJECTION INTRAMUSCULAR; INTRAVENOUS EVERY 10 MIN PRN
Status: DISCONTINUED | OUTPATIENT
Start: 2019-04-30 | End: 2019-04-30 | Stop reason: HOSPADM

## 2019-04-30 RX ORDER — BUPIVACAINE HYDROCHLORIDE 7.5 MG/ML
INJECTION, SOLUTION INTRASPINAL PRN
Status: DISCONTINUED | OUTPATIENT
Start: 2019-04-30 | End: 2019-04-30 | Stop reason: SDUPTHER

## 2019-04-30 RX ORDER — PROPRANOLOL HYDROCHLORIDE 10 MG/1
10 TABLET ORAL ONCE
Status: COMPLETED | OUTPATIENT
Start: 2019-04-30 | End: 2019-04-30

## 2019-04-30 RX ORDER — SODIUM CHLORIDE, SODIUM LACTATE, POTASSIUM CHLORIDE, CALCIUM CHLORIDE 600; 310; 30; 20 MG/100ML; MG/100ML; MG/100ML; MG/100ML
INJECTION, SOLUTION INTRAVENOUS CONTINUOUS
Status: DISCONTINUED | OUTPATIENT
Start: 2019-04-30 | End: 2019-05-02 | Stop reason: HOSPADM

## 2019-04-30 RX ORDER — SODIUM CHLORIDE 0.9 % (FLUSH) 0.9 %
10 SYRINGE (ML) INJECTION EVERY 12 HOURS SCHEDULED
Status: DISCONTINUED | OUTPATIENT
Start: 2019-04-30 | End: 2019-05-02 | Stop reason: HOSPADM

## 2019-04-30 RX ORDER — SODIUM CHLORIDE 0.9 % (FLUSH) 0.9 %
10 SYRINGE (ML) INJECTION PRN
Status: DISCONTINUED | OUTPATIENT
Start: 2019-04-30 | End: 2019-05-02 | Stop reason: HOSPADM

## 2019-04-30 RX ORDER — TRAZODONE HYDROCHLORIDE 50 MG/1
100 TABLET ORAL NIGHTLY
Status: DISCONTINUED | OUTPATIENT
Start: 2019-04-30 | End: 2019-05-02 | Stop reason: HOSPADM

## 2019-04-30 RX ORDER — LIDOCAINE HYDROCHLORIDE 20 MG/ML
INJECTION, SOLUTION INFILTRATION; PERINEURAL PRN
Status: DISCONTINUED | OUTPATIENT
Start: 2019-04-30 | End: 2019-04-30 | Stop reason: SDUPTHER

## 2019-04-30 RX ORDER — LISINOPRIL 10 MG/1
10 TABLET ORAL DAILY
Status: DISCONTINUED | OUTPATIENT
Start: 2019-04-30 | End: 2019-05-02 | Stop reason: HOSPADM

## 2019-04-30 RX ORDER — NICOTINE POLACRILEX 4 MG
15 LOZENGE BUCCAL PRN
Status: DISCONTINUED | OUTPATIENT
Start: 2019-04-30 | End: 2019-05-02 | Stop reason: HOSPADM

## 2019-04-30 RX ORDER — SODIUM CHLORIDE 0.9 % (FLUSH) 0.9 %
10 SYRINGE (ML) INJECTION EVERY 12 HOURS SCHEDULED
Status: DISCONTINUED | OUTPATIENT
Start: 2019-04-30 | End: 2019-04-30 | Stop reason: HOSPADM

## 2019-04-30 RX ORDER — ONDANSETRON 2 MG/ML
INJECTION INTRAMUSCULAR; INTRAVENOUS PRN
Status: DISCONTINUED | OUTPATIENT
Start: 2019-04-30 | End: 2019-04-30 | Stop reason: SDUPTHER

## 2019-04-30 RX ADMIN — SODIUM CHLORIDE, POTASSIUM CHLORIDE, SODIUM LACTATE AND CALCIUM CHLORIDE: 600; 310; 30; 20 INJECTION, SOLUTION INTRAVENOUS at 06:25

## 2019-04-30 RX ADMIN — ONDANSETRON 4 MG: 2 INJECTION INTRAMUSCULAR; INTRAVENOUS at 19:21

## 2019-04-30 RX ADMIN — BUPIVACAINE HYDROCHLORIDE 1.6 ML: 7.5 INJECTION, SOLUTION SUBARACHNOID at 07:11

## 2019-04-30 RX ADMIN — LISINOPRIL 10 MG: 10 TABLET ORAL at 12:49

## 2019-04-30 RX ADMIN — PROPRANOLOL HYDROCHLORIDE 10 MG: 10 TABLET ORAL at 06:55

## 2019-04-30 RX ADMIN — SODIUM CHLORIDE, POTASSIUM CHLORIDE, SODIUM LACTATE AND CALCIUM CHLORIDE: 600; 310; 30; 20 INJECTION, SOLUTION INTRAVENOUS at 08:27

## 2019-04-30 RX ADMIN — MIDAZOLAM HYDROCHLORIDE 1 MG: 2 INJECTION, SOLUTION INTRAMUSCULAR; INTRAVENOUS at 07:13

## 2019-04-30 RX ADMIN — HYDROCODONE BITARTRATE AND ACETAMINOPHEN 2 TABLET: 5; 325 TABLET ORAL at 23:37

## 2019-04-30 RX ADMIN — HYDROMORPHONE HYDROCHLORIDE 0.5 MG: 1 INJECTION, SOLUTION INTRAMUSCULAR; INTRAVENOUS; SUBCUTANEOUS at 10:03

## 2019-04-30 RX ADMIN — SODIUM CHLORIDE, POTASSIUM CHLORIDE, SODIUM LACTATE AND CALCIUM CHLORIDE: 600; 310; 30; 20 INJECTION, SOLUTION INTRAVENOUS at 11:28

## 2019-04-30 RX ADMIN — HYDROCHLOROTHIAZIDE 12.5 MG: 25 TABLET ORAL at 12:49

## 2019-04-30 RX ADMIN — PHENYLEPHRINE HYDROCHLORIDE 100 MCG: 10 INJECTION INTRAVENOUS at 08:07

## 2019-04-30 RX ADMIN — ACETAMINOPHEN 1000 MG: 500 TABLET ORAL at 06:45

## 2019-04-30 RX ADMIN — FAMOTIDINE 20 MG: 20 TABLET ORAL at 12:49

## 2019-04-30 RX ADMIN — TRANEXAMIC ACID 1000 MG: 100 INJECTION, SOLUTION INTRAVENOUS at 07:20

## 2019-04-30 RX ADMIN — PHENYLEPHRINE HYDROCHLORIDE 100 MCG: 10 INJECTION INTRAVENOUS at 08:27

## 2019-04-30 RX ADMIN — FAMOTIDINE 20 MG: 20 TABLET ORAL at 20:27

## 2019-04-30 RX ADMIN — DEXAMETHASONE SODIUM PHOSPHATE 8 MG: 4 INJECTION, SOLUTION INTRAMUSCULAR; INTRAVENOUS at 07:49

## 2019-04-30 RX ADMIN — DOCUSATE SODIUM 100 MG: 100 CAPSULE, LIQUID FILLED ORAL at 20:27

## 2019-04-30 RX ADMIN — SODIUM CHLORIDE, POTASSIUM CHLORIDE, SODIUM LACTATE AND CALCIUM CHLORIDE: 600; 310; 30; 20 INJECTION, SOLUTION INTRAVENOUS at 06:54

## 2019-04-30 RX ADMIN — PROPRANOLOL HYDROCHLORIDE 10 MG: 10 TABLET ORAL at 12:49

## 2019-04-30 RX ADMIN — Medication 2 G: at 07:20

## 2019-04-30 RX ADMIN — HYDROCODONE BITARTRATE AND ACETAMINOPHEN 2 TABLET: 5; 325 TABLET ORAL at 17:28

## 2019-04-30 RX ADMIN — GLYCOPYRROLATE 0.2 MG: 0.2 INJECTION, SOLUTION INTRAMUSCULAR; INTRAVENOUS at 07:55

## 2019-04-30 RX ADMIN — MIDAZOLAM HYDROCHLORIDE 1 MG: 2 INJECTION, SOLUTION INTRAMUSCULAR; INTRAVENOUS at 07:15

## 2019-04-30 RX ADMIN — PHENYLEPHRINE HYDROCHLORIDE 100 MCG: 10 INJECTION INTRAVENOUS at 08:39

## 2019-04-30 RX ADMIN — DOCUSATE SODIUM 100 MG: 100 CAPSULE, LIQUID FILLED ORAL at 12:49

## 2019-04-30 RX ADMIN — SODIUM CHLORIDE, PRESERVATIVE FREE 10 ML: 5 INJECTION INTRAVENOUS at 20:29

## 2019-04-30 RX ADMIN — HYDROMORPHONE HYDROCHLORIDE 0.5 MG: 1 INJECTION, SOLUTION INTRAMUSCULAR; INTRAVENOUS; SUBCUTANEOUS at 09:50

## 2019-04-30 RX ADMIN — LIDOCAINE HYDROCHLORIDE 30 MG: 10 INJECTION, SOLUTION INFILTRATION; PERINEURAL at 07:04

## 2019-04-30 RX ADMIN — Medication 2 G: at 16:17

## 2019-04-30 RX ADMIN — TRAZODONE HYDROCHLORIDE 100 MG: 50 TABLET ORAL at 23:37

## 2019-04-30 RX ADMIN — SERTRALINE HYDROCHLORIDE 50 MG: 50 TABLET ORAL at 12:48

## 2019-04-30 RX ADMIN — HYDROCODONE BITARTRATE AND ACETAMINOPHEN 2 TABLET: 5; 325 TABLET ORAL at 12:49

## 2019-04-30 RX ADMIN — PROPOFOL 100 MCG/KG/MIN: 10 INJECTION, EMULSION INTRAVENOUS at 07:17

## 2019-04-30 RX ADMIN — ONDANSETRON 4 MG: 2 INJECTION INTRAMUSCULAR; INTRAVENOUS at 07:49

## 2019-04-30 RX ADMIN — LIDOCAINE HYDROCHLORIDE 60 MG: 20 INJECTION, SOLUTION INFILTRATION; PERINEURAL at 07:15

## 2019-04-30 RX ADMIN — ONDANSETRON 4 MG: 2 INJECTION INTRAMUSCULAR; INTRAVENOUS at 11:27

## 2019-04-30 RX ADMIN — PROPOFOL 60 MG: 10 INJECTION, EMULSION INTRAVENOUS at 07:15

## 2019-04-30 RX ADMIN — KETOROLAC TROMETHAMINE 15 MG: 30 INJECTION, SOLUTION INTRAMUSCULAR; INTRAVENOUS at 15:05

## 2019-04-30 ASSESSMENT — PULMONARY FUNCTION TESTS
PIF_VALUE: 0
PIF_VALUE: 1
PIF_VALUE: 0
PIF_VALUE: 1
PIF_VALUE: 0
PIF_VALUE: 1
PIF_VALUE: 0
PIF_VALUE: 1
PIF_VALUE: 0
PIF_VALUE: 1
PIF_VALUE: 0
PIF_VALUE: 1
PIF_VALUE: 0

## 2019-04-30 ASSESSMENT — PAIN DESCRIPTION - PAIN TYPE
TYPE: ACUTE PAIN;SURGICAL PAIN
TYPE: SURGICAL PAIN

## 2019-04-30 ASSESSMENT — PAIN SCALES - GENERAL
PAINLEVEL_OUTOF10: 6
PAINLEVEL_OUTOF10: 7
PAINLEVEL_OUTOF10: 5
PAINLEVEL_OUTOF10: 7
PAINLEVEL_OUTOF10: 6
PAINLEVEL_OUTOF10: 8
PAINLEVEL_OUTOF10: 7

## 2019-04-30 ASSESSMENT — PAIN DESCRIPTION - LOCATION
LOCATION: KNEE
LOCATION: KNEE

## 2019-04-30 ASSESSMENT — PAIN DESCRIPTION - ORIENTATION
ORIENTATION: LEFT
ORIENTATION: LEFT

## 2019-04-30 ASSESSMENT — PAIN - FUNCTIONAL ASSESSMENT: PAIN_FUNCTIONAL_ASSESSMENT: 0-10

## 2019-04-30 ASSESSMENT — PAIN DESCRIPTION - DESCRIPTORS
DESCRIPTORS: BURNING
DESCRIPTORS: BURNING

## 2019-04-30 NOTE — PROGRESS NOTES
Occupational Therapy   Occupational Therapy Initial Assessment  Date: 2019   Patient Name: Lisette Medrano  MRN: [id number]     : 1940    Date of Service: 2019    Discharge Recommendations:  24 hour supervision or assist  OT Equipment Recommendations  Equipment Needed: No    Assessment   Performance deficits / Impairments: Decreased functional mobility ; Decreased endurance;Decreased high-level IADLs;Decreased ADL status; Decreased balance  Prognosis: Good  Decision Making: Medium Complexity  Patient Education: Role of OT, Transfer training, ADL training   REQUIRES OT FOLLOW UP: Yes  Activity Tolerance  Activity Tolerance: Patient Tolerated treatment well;Patient limited by pain  Safety Devices  Safety Devices in place: Yes  Type of devices: Left in chair;Chair alarm in place; Patient at risk for falls; All fall risk precautions in place;Nurse notified           Patient Diagnosis(es): The encounter diagnosis was Primary osteoarthritis of left knee. has a past medical history of Arthritis, Cancer ([facility] [address] High blood pressure, History of blood transfusion, PONV (postoperative nausea and vomiting), Seasonal allergies, and Thyroid disease. has a past surgical history that includes Thyroid surgery; Hysterectomy; Mastectomy; Breast surgery (Right); and Total knee arthroplasty (Left, 2019). Restrictions  Restrictions/Precautions  Restrictions/Precautions: Weight Bearing, General Precautions, Fall Risk  Required Braces or Orthoses?: Yes  Lower Extremity Weight Bearing Restrictions  Left Lower Extremity Weight Bearing: Weight Bearing As Tolerated  Required Braces or Orthoses  Left Lower Extremity Brace: Knee Immobilizer  LLE Brace Type:  When ambulating until able to perform SLR in PT    Subjective   General  Chart Reviewed: Yes  Patient assessed for rehabilitation services?: Yes  Family / Caregiver Present: Yes( )  Diagnosis: L TKR   Subjective  Subjective: Patient pleasant and agreeable to OT   Pain Assessment  Pain Assessment: 0-10  Pain Level: 7  Pain Type: Acute pain;Surgical pain  Pain Location: Knee  Pain Orientation: Left  Non-Pharmaceutical Pain Intervention(s): Ambulation/Increased Activity;Repositioned;Cold applied  Response to Pain Intervention: Patient Satisfied  Oxygen Therapy  SpO2: 93 %  O2 Device: None (Room air)  Social/Functional History  Social/Functional History  Lives With: Spouse( available 24-hr)  Type of Home: House  Home Layout: One level  Home Access: Stairs to enter without rails  Entrance Stairs - Number of Steps: 2 curb steps  Bathroom Shower/Tub: Walk-in shower(small step into shower)  Bathroom Toilet: Handicap height  Bathroom Equipment: Shower chair, Grab bars in shower, Toilet raiser  Home Equipment: Rolling walker, [employer], [employer]  ADL Assistance: Independent  Homemaking Assistance: Independent  Homemaking Responsibilities: Yes  Ambulation Assistance: Independent  Transfer Assistance: Independent  Active : Yes  Occupation: Retired  Type of occupation: hairdresser       Objective              Balance  Sitting Balance: Supervision  Standing Balance: Contact guard assistance  Standing Balance  Activity: toileting, grooming, LB dressing   Functional Mobility  Functional - Mobility Device: Rolling Walker  Activity: To/from bathroom  Assist Level: Contact guard assistance  Functional Mobility Comments: verbal cues for safety   Toilet Transfers  Toilet - Technique: Ambulating  Equipment Used: Standard toilet  Toilet Transfer: Contact guard assistance  ADL  Grooming: Contact guard assistance  LE Dressing:  Moderate assistance  Toileting: Minimal assistance(assistance for LB management )  Tone RUE  RUE Tone: Normotonic  Tone LUE  LUE Tone: Normotonic  Quality of Movement Other  Comment: Facial twitches at baseline        Transfers  Sit to stand: Contact guard assistance  Stand to sit: Contact guard assistance     Cognition  Overall Cognitive

## 2019-04-30 NOTE — PROGRESS NOTES
4 Eyes Skin Assessment     The patient is being assess for   Post-Op Surgical    I agree that 2 RN's have performed a thorough Head to Toe Skin Assessment on the patient. ALL assessment sites listed below have been assessed. Areas assessed by both nurses:   [x]   Head, Face, and Ears   [x]   Shoulders, Back, and Chest, Abdomen  [x]   Arms, Elbows, and Hands   [x]   Coccyx, Sacrum, and Ischium  [x]   Legs, Feet, and Heels        Ring worm on Right Breast and Right scapula. **SHARE this note so that the co-signing nurse is able to place an eSignature**    Co-signer eSignature: Electronically signed by Randal So RN on 4/30/19 at 10:35 AM    Does the Patient have Skin Breakdown?   No          Ac Prevention initiated:  NA   Wound Care Orders initiated:  NA      [facility] nurse consulted for Pressure Injury (Stage 3,4, Unstageable, DTI, NWPT, Complex wounds)and New or Established Ostomies:  NA      Primary Nurse eSignature: Electronically signed by Suzan Mabry RN on 4/30/19 at 10:57 AM

## 2019-04-30 NOTE — PROGRESS NOTES
Patient arrived to PACU. VSS. Report from [facility] and [address] [place] [address].

## 2019-04-30 NOTE — ANESTHESIA PRE PROCEDURE
Department of Anesthesiology  Preprocedure Note       Name:  Sariah Cueva   Age:  66 y.o.  :  1940                                          MRN:  [id number]         Date:  2019      Surgeon: Lianet Barbosa):  Madelaine Covarrubias MD    Procedure: LEFT TOTAL KNEE MAKOPLASTY WITH ADDUCTOR CANAL BLOCK FOR PAIN CONTROL    MARTIN VAUGHN (Left Knee)    Medications prior to admission:   Prior to Admission medications    Medication Sig Start Date End Date Taking? Authorizing Provider   Probiotic Product (PROBIOTIC PO) Take 1 capsule by mouth daily   Yes Historical Provider, MD   Hyaluronic Acid-Vitamin C (HYALURONIC ACID PO) Take 1 capsule by mouth daily   Yes Historical Provider, MD   mupirocin (BACTROBAN) 2 % ointment 1 22 gram tube. Apply 1 cm (small drop) to a q-tip and swab the inside of each nostril twice a day starting 5 days prior to surgery. 3/14/19   Madelaine Covarrubias MD   meloxicam (MOBIC) 15 MG tablet TAKE 1 TABLET BY MOUTH EVERY DAY 19   Frank Birmingham PA-C   aspirin 81 MG EC tablet Take 81 mg by mouth    Historical Provider, MD   cetirizine (ZYRTEC) 10 MG tablet TAKE 1 TABLET BY MOUTH DAILY PRN 8/15/17   Historical Provider, MD   escitalopram (LEXAPRO) 10 MG tablet Take 10 mg by mouth 17   Historical Provider, MD   fluticasone [facility]) 50 MCG/ACT nasal spray 2 sprays by Nasal route 17   Historical Provider, MD   lisinopril-hydrochlorothiazide (PRINZIDE;ZESTORETIC) 10-12.5 MG per tablet TAKE 1 TABLET BY MOUTH DAILY 17   Historical Provider, MD   propranolol (INDERAL) 10 MG tablet TAKE 1 TABLET BY MOUTH Daily 18   Historical Provider, MD   LORazepam (ATIVAN) 0.5 MG tablet Take 0.5 mg by mouth. 17   Historical Provider, MD   traZODone (DESYREL) 100 MG tablet Take 100 mg by mouth nightly    Historical Provider, MD       Current medications:    No current facility-administered medications for this encounter.       Current Outpatient Medications   Medication Sig Dispense Refill    Probiotic Product (PROBIOTIC PO) Take 1 capsule by mouth daily      Hyaluronic Acid-Vitamin C (HYALURONIC ACID PO) Take 1 capsule by mouth daily      mupirocin (BACTROBAN) 2 % ointment 1 22 gram tube. Apply 1 cm (small drop) to a q-tip and swab the inside of each nostril twice a day starting 5 days prior to surgery. 1 Tube 0    meloxicam (MOBIC) 15 MG tablet TAKE 1 TABLET BY MOUTH EVERY DAY 90 tablet 0    aspirin 81 MG EC tablet Take 81 mg by mouth      cetirizine (ZYRTEC) 10 MG tablet TAKE 1 TABLET BY MOUTH DAILY PRN      escitalopram (LEXAPRO) 10 MG tablet Take 10 mg by mouth      fluticasone (FLONASE) 50 MCG/ACT nasal spray 2 sprays by Nasal route      lisinopril-hydrochlorothiazide (PRINZIDE;ZESTORETIC) 10-12.5 MG per tablet TAKE 1 TABLET BY MOUTH DAILY      propranolol (INDERAL) 10 MG tablet TAKE 1 TABLET BY MOUTH Daily      LORazepam (ATIVAN) 0.5 MG tablet Take 0.5 mg by mouth.  traZODone (DESYREL) 100 MG tablet Take 100 mg by mouth nightly         Allergies:     Allergies   Allergen Reactions    Iodine Swelling    Prochlorperazine Edisylate Other (See Comments)     A long time ago; doesn't recall reaction    Shellfish Allergy     Shellfish-Derived Products     Morphine Nausea And Vomiting    Sulfa Antibiotics Palpitations       Problem List:    Patient Active Problem List   Diagnosis Code    Primary osteoarthritis of left knee M17.12    Primary osteoarthritis of both knees M17.0    Primary osteoarthritis of left shoulder M19.012       Past Medical History:        Diagnosis Date    Arthritis     Cancer ([facility] [address]     Breast cancer 30 years ago    High blood pressure     History of blood transfusion     PONV (postoperative nausea and vomiting)     after one surgery only    Seasonal allergies     Thyroid disease     polyp       Past Surgical History:        Procedure Laterality Date    BREAST SURGERY Right     mastectomy with removal of lymph nodes    HYSTERECTOMY      MASTECTOMY  THYROID SURGERY         Social History:    Social History     Tobacco Use    Smoking status: Former Smoker    Smokeless tobacco: Never Used   Substance Use Topics    Alcohol use: Yes     Comment: occasionally                                Counseling given: Not Answered      Vital Signs (Current):   Vitals:    04/24/19 1553   Weight: 160 lb (72.6 kg)   Height: 5' 2.5\" (1.588 m)                                              BP Readings from Last 3 Encounters:   02/07/19 (!) 163/94   01/25/18 (!) 152/100       NPO Status:                                                                                 BMI:   Wt Readings from Last 3 Encounters:   02/07/19 160 lb (72.6 kg)   04/11/18 160 lb (72.6 kg)   01/25/18 158 lb (71.7 kg)     Body mass index is 28.8 kg/m². CBC:   Lab Results   Component Value Date    WBC 5.1 04/11/2019    RBC 4.48 04/11/2019    HGB 13.5 04/11/2019    HCT 40.7 04/11/2019    MCV 90.7 04/11/2019    RDW 14.1 04/11/2019     04/11/2019       CMP:   Lab Results   Component Value Date     04/11/2019    K 4.2 04/11/2019    CL 95 04/11/2019    CO2 30 04/11/2019    BUN 14 04/11/2019    CREATININE 0.7 04/11/2019    GFRAA >60 04/11/2019    LABGLOM >60 04/11/2019    GLUCOSE 119 04/11/2019    CALCIUM 9.7 04/11/2019       POC Tests: No results for input(s): POCGLU, POCNA, POCK, POCCL, POCBUN, POCHEMO, POCHCT in the last 72 hours. Coags:   Lab Results   Component Value Date    PROTIME 11.5 04/11/2019    INR 1.01 04/11/2019    APTT 28.8 04/11/2019       HCG (If Applicable): No results found for: PREGTESTUR, PREGSERUM, HCG, HCGQUANT     ABGs: No results found for: PHART, PO2ART, ICS1LKJ, NSC7UQK, BEART, Z2PBHZWW     Type & Screen (If Applicable):  No results found for: LABABO, [address]    Anesthesia Evaluation  Patient summary reviewed   history of anesthetic complications: PONV.   Airway: Mallampati: II  TM distance: >3 FB   Neck ROM: full  Mouth opening: > = 3 FB Dental: normal exam Pulmonary:Negative Pulmonary ROS                              Cardiovascular:    (+) hypertension:,                   Neuro/Psych:   Negative Neuro/Psych ROS              GI/Hepatic/Renal: Neg GI/Hepatic/Renal ROS       (-) GERD, liver disease and no renal disease       Endo/Other:    (+) malignancy/cancer (breast). (-) diabetes mellitus               Abdominal:           Vascular: negative vascular ROS. Anesthesia Plan      regional and spinal     ASA 2     (Adductor canal block for post op pain.)  Induction: intravenous. MIPS: Postoperative opioids intended and Prophylactic antiemetics administered. Anesthetic plan and risks discussed with patient. Plan discussed with CRNA. All questions answered and agrees with plan.         Zoltan Ritchie MD   4/30/2019

## 2019-04-30 NOTE — PLAN OF CARE
Problem: Falls - Risk of:  Goal: Will remain free from falls  Description  Will remain free from falls  Outcome: Ongoing     Problem: Musculor/Skeletal Functional Status  Intervention: OT Evaluation/treatment  4/30/2019 1535 by ELYSSA Graves/L  Note:   Increase patients ADLs/functional status to baseline.        Problem: Cardiac:  Goal: Ability to maintain vital signs within normal range will improve  Description  Ability to maintain vital signs within normal range will improve  Outcome: Ongoing

## 2019-04-30 NOTE — PROGRESS NOTES
Physical Therapy    Facility/Department: [facility] C5 - MED SURG/ORTHO  Initial Assessment    NAME: Erika Quick  : 1940  MRN: [id number]    Date of Service: 2019    Discharge Recommendations:  24 hour supervision or assist, Outpatient PT   PT Equipment Recommendations  Equipment Needed: No  Other: pt owns RW    Assessment   Body structures, Functions, Activity limitations: Decreased functional mobility ; Decreased endurance;Decreased ROM; Decreased strength;Decreased balance;Decreased safe awareness; Increased Pain  Assessment: Pt is 67 yo female who presents POD 0 s/p left TKA. Pt grossly CGA for mobility. Reports increased pain with ambulation but improved once seated in chair and ice applied. Denies dizzines throughout session. Pt would benefit from continued skilled therapy to address deficits. Recommend home with 24-hr SUP and OP PT at d/c. Treatment Diagnosis: decreased (I) with mobility s/p left TKA  Specific instructions for Next Treatment: progress mobility as tolerated  Prognosis: Good  Decision Making: Medium Complexity  Patient Education: Role of PT, safety with mobility, POC, d/c recs, WBAT LLE, knee immobilizer, ther ex; pt verbalized understanding  Barriers to Learning: none  REQUIRES PT FOLLOW UP: Yes  Activity Tolerance  Activity Tolerance: Patient Tolerated treatment well;Patient limited by pain  Activity Tolerance: BP seated /85; BP at end of session seated in chair 121/79       Patient Diagnosis(es): The encounter diagnosis was Primary osteoarthritis of left knee. has a past medical history of Arthritis, Cancer ([facility] [address] High blood pressure, History of blood transfusion, PONV (postoperative nausea and vomiting), Seasonal allergies, and Thyroid disease. has a past surgical history that includes Thyroid surgery; Hysterectomy; Mastectomy; Breast surgery (Right); and Total knee arthroplasty (Left, 2019).     Restrictions  Restrictions/Precautions  Restrictions/Precautions: Weight Bearing, General Precautions, Fall Risk  Required Braces or Orthoses?: Yes  Lower Extremity Weight Bearing Restrictions  Left Lower Extremity Weight Bearing: Weight Bearing As Tolerated  Required Braces or Orthoses  Left Lower Extremity Brace: Knee Immobilizer  LLE Brace Type:  When ambulating until able to perform SLR in PT  Vision/Hearing  Vision: Impaired  Vision Exceptions: Wears glasses for reading  Hearing: Within functional limits     Subjective  General  Chart Reviewed: Yes  Patient assessed for rehabilitation services?: Yes  Response To Previous Treatment: Not applicable  Family / Caregiver Present: Yes()  Referring Practitioner: GABY Carrillo  Referral Date : 04/30/19  Diagnosis: s/p left TKA 4/30/19  Follows Commands: Within Functional Limits  General Comment  Comments: Pt resting in bed on approach; RN cleared pt for therapy  Subjective  Subjective: Pt resting in bed on approach  Pain Screening  Patient Currently in Pain: Yes  Pain Assessment  Pain Assessment: 0-10  Pain Level: 7  Pain Type: Acute pain;Surgical pain  Pain Location: Knee  Pain Orientation: Left  Non-Pharmaceutical Pain Intervention(s): Ambulation/Increased Activity;Repositioned;Cold applied  Response to Pain Intervention: Patient Satisfied       Orientation  Orientation  Overall Orientation Status: Within Functional Limits  Social/Functional History  Social/Functional History  Lives With: Spouse( available 24-hr)  Type of Home: House  Home Layout: One level  Home Access: Stairs to enter without rails  Entrance Stairs - Number of Steps: 2 curb steps  Bathroom Shower/Tub: Walk-in shower(small step into shower)  Bathroom Toilet: Handicap height  Bathroom Equipment: Shower chair, Grab bars in shower, Toilet raiser  Home Equipment: Rolling walker, Cane, [employer]  ADL Assistance: [address] [place] [address] Avenue: Independent  Homemaking Responsibilities: Yes  Ambulation Assistance: Independent  Transfer Assistance: Independent  Active : Yes  Occupation: Retired  Type of occupation: hairdresser    Objective   AROM RLE (degrees)  RLE AROM: WFL  AROM LLE (degrees)  LLE General AROM: Hip and ankle WFL; knee limited due to pain flexion and extension  Strength RLE  Strength RLE: WFL  Strength LLE  Comment: Hip and ankle appear WFL; fair quad set     Sensation  Overall Sensation Status: WFL(denies N/T)     Bed mobility  Supine to Sit: Minimal assistance(for LLE; cues for technique, HOB elevated, use of rails)  Sit to Supine: Unable to assess(pt up in chair at end of session)     Transfers  Sit to Stand: Contact guard assistance(from EOB and commode to RW. Cues for hand placement)  Stand to sit: Contact guard assistance     Ambulation  Ambulation?: Yes  WB Status: WBAT LLE  Ambulation 1  Surface: level tile  Device: Rolling Walker  Other Apparatus: Knee Immobilizer; Left  Assistance: Contact guard assistance  Quality of Gait: Cues for sequencing and step to pattern. No LOB or unsteadiness. Decreased step length and lacks left heel strike. Cues for forward gaze and to maintain BURT within RW especially during turns  Distance: 15 ft + 30 ft     Balance  Sitting - Static: Good  Sitting - Dynamic: Good  Standing - Static: Good;-  Standing - Dynamic: Fair     Exercises  Straight Leg Raise: x 10 LLE CGA  Quad Sets: x 10 BLE  Heelslides: x 10 LLE supine and seated  Gluteal Sets: x 10 BLE  Knee Short Arc Quad: x 10 LLE  Ankle Pumps: x 12 BLE     Plan   Plan  Times per week: BID 7 days/wk  Times per day: Twice a day  Specific instructions for Next Treatment: progress mobility as tolerated  Current Treatment Recommendations: Strengthening, Gait Training, Patient/Caregiver Education & Training, ROM, Stair training, Balance Training, Neuromuscular Re-education, Functional Mobility Training, Endurance Training, Transfer Training, Safety Education & Training, Home Exercise Program  Safety Devices  Type of devices:  All fall risk precautions in place, Call light within reach, Gait belt, Patient at risk for falls, Nurse notified, Left in chair, Chair alarm in place           AM-PAC Score     AM-PAC Inpatient Mobility without Stair Climbing Raw Score : 16  AM-PAC Inpatient without Stair Climbing T-Scale Score : 45.54  Mobility Inpatient CMS 0-100% Score: 40.64  Mobility Inpatient without Stair CMS G-Code Modifier : CK       Goals  Short term goals  Time Frame for Short term goals: 3 days (5/3) unless otherwise specified  Short term goal 1: Pt will be indep with bed mobility. Short term goal 2: Pt will be supervision for transfers with RW. Short term goal 3: Pt will be supervision for ambulation 100 ft with RW. Short term goal 4: Pt will negotiate curb step x 2 reps with RW and SBA. Short term goal 5: 5/1: Pt will be indep with TKA protocol exercises.   Patient Goals   Patient goals : \"to go home tomorrow\"       Therapy Time   Individual Concurrent Group Co-treatment   Time In 1633         Time Out 1442         Minutes 43         Timed Code Treatment Minutes: 1400 [address], DPT #[id number]

## 2019-04-30 NOTE — CONSULTS
jugular venous distention. Trachea midline. Respiratory:  Normal respiratory effort. Clear to auscultation, bilaterally without Rales/Wheezes/Rhonchi. Cardiovascular: Regular rate and rhythm with normal S1/S2 without murmurs, rubs or gallops. Abdomen: Soft, non-tender, non-distended with normal bowel sounds. Musculoskeletal: LTKR braced and aced No clubbing, cyanosis or edema bilaterally. Skin: Skin color, texture, turgor normal.  No rashes or lesions. Neurologic:  Neurovascularly intact without any focal sensory/motor deficits. Cranial nerves: II-XII intact, grossly non-focal.  Psychiatric: Alert and oriented, thought content appropriate, normal insight  Capillary Refill: Brisk,< 3 seconds   Peripheral Pulses: +2 palpable, equal bilaterally     Labs:     No results for input(s): WBC, HGB, HCT, PLT in the last 72 hours. No results for input(s): NA, K, CL, CO2, BUN, CREATININE, CALCIUM, PHOS in the last 72 hours. Invalid input(s): MAGNES  No results for input(s): AST, ALT, BILIDIR, BILITOT, ALKPHOS in the last 72 hours. No results for input(s): INR in the last 72 hours. No results for input(s): Delcie Whatley in the last 72 hours. Urinalysis:    Lab Results   Component Value Date    NITRU Negative 04/11/2019    BLOODU Negative 04/11/2019    SPECGRAV 1.010 04/11/2019    GLUCOSEU Negative 04/11/2019       Radiology: I have reviewed the radiology reports with the following interpretation:     No orders to display            ASSESSMENT:    KENIA/[address] Problems    Diagnosis Date Noted    Osteoarthritis of left knee [M17.12] 04/30/2019    Primary osteoarthritis of left knee [M17.12] 01/25/2018       PLAN:    Primary osteoarthritis of left knee -s/p LTKR 4/30/2019  Management per primary team  PT/OT as directed    Anxiety  Continue Trazadone. Has had a trial of remeron with her PCP recently. Defer    Essential hypertension   Continue home meds.    Monitor for post op hypotension    DDD (degenerative

## 2019-04-30 NOTE — OP NOTE
after administration of a general anesthetic and femoral nerve block, was placed  on the OR table in the supine position. The lower extremity was prepped and draped in normal sterile fashion. The limb was exsanguinated. Tourniquet was inflated to 350 mmHg. Informed consent was obtained. Operative site was signed. Time out was called. Patient was given prophylactic antibiotics and DVT prophylaxsis. A straight midline incision was made. Median parapatellar arthrotomy was made. The patella was subluxated laterally. The menisci and ACL were resected. The femoral and tibial tracker pins were placed in a bicortical fashion in the proximal tibial and distal femur. The [employer] robot from [employer] was used to input our patient registration and anatomical survey into the computer. Initial kinematic survey showed the patient to have persistent mild varus tracking starting from 5 degrees of extension to approximately 100 degrees of flexion. Preoperative templated sizes and alignment were confirmed preoperatively using a size 3 femoral component, a size 4 tibial tray and a size 11 insert. Ligament balancing was then performed using our robotic software. Next, the robotic interactive orthopedic arm was brought into the field. It  was registered. Our tibial cut was first made. Next, our distal femoral cut,  our anterior and posterior chamfer cuts and anterior and posterior cuts were  then made. Our femoral trial and tibial trial were the placed along with an insert. The patella was resurfaced using a symmetric patella component using our reamer system for our  patella. Trials were then placed. The knee was put through a full range of  motion noting full extension and to at least 130 degrees of knee flexion with  balance flexion and extension gap and neutral alignment to approximately 2-3  degrees of varus throughout the entire range of motion.   Ligament balancing was then performed noting excellent ligament balance both medial and laterally. The patella  tracked centrally with out lateral retinacular release. Next, the posterior osteophytes and meniscal remnants were resected. The pain cocktail was injected into the posterior capsule of the medial and lateral gutters. Final range of motion: 0 extension to 130 degrees of flexion. Next all trials were removed. The femoral and tibia array pins were removed. The cut bony surfaces were irrigated with copious amounts of irrigation solution. The tibial, femoral and patellar components were cemented in place. The insert was then placed. The knee was held in extension. The knee was irrigated with 5 liters of irrigation solution using pulsatile lavage. The tourniquet was let down before closure. Any bleeding vessels were coagulated using the Bovie cautery. The knee was then closed in layers using #2 Ethibond to close the extensor mechanism, a combination of 0- and 2-0 Monocryl to close the subcutaneous tissue and 4-0 Monocryl to close the skin. Dermaflex was applied to seal the wound and allowed to dry before a dry sterile compression dressing was applied. The patient was then taken to recovery in stable condition having tolerated the procedure well.     Toy Norwood M.D.

## 2019-05-01 LAB
ANION GAP SERPL CALCULATED.3IONS-SCNC: 9 MMOL/L (ref 3–16)
ATYPICAL LYMPHOCYTE RELATIVE PERCENT: 1 % (ref 0–6)
BANDED NEUTROPHILS RELATIVE PERCENT: 8 % (ref 0–7)
BASOPHILS ABSOLUTE: 0 K/UL (ref 0–0.2)
BASOPHILS RELATIVE PERCENT: 0 %
BUN BLDV-MCNC: 15 MG/DL (ref 7–20)
CALCIUM SERPL-MCNC: 9 MG/DL (ref 8.3–10.6)
CHLORIDE BLD-SCNC: 94 MMOL/L (ref 99–110)
CO2: 30 MMOL/L (ref 21–32)
CREAT SERPL-MCNC: 0.6 MG/DL (ref 0.6–1.2)
EOSINOPHILS ABSOLUTE: 0 K/UL (ref 0–0.6)
EOSINOPHILS RELATIVE PERCENT: 0 %
ESTIMATED AVERAGE GLUCOSE: 125.5 MG/DL
GFR AFRICAN AMERICAN: >60
GFR NON-AFRICAN AMERICAN: >60
GLUCOSE BLD-MCNC: 128 MG/DL (ref 70–99)
GLUCOSE BLD-MCNC: 130 MG/DL (ref 70–99)
GLUCOSE BLD-MCNC: 134 MG/DL (ref 70–99)
GLUCOSE BLD-MCNC: 141 MG/DL (ref 70–99)
GLUCOSE BLD-MCNC: 170 MG/DL (ref 70–99)
HBA1C MFR BLD: 6 %
HCT VFR BLD CALC: 31.3 % (ref 36–48)
HEMOGLOBIN: 10.7 G/DL (ref 12–16)
LYMPHOCYTES ABSOLUTE: 1 K/UL (ref 1–5.1)
LYMPHOCYTES RELATIVE PERCENT: 9 %
MCH RBC QN AUTO: 31 PG (ref 26–34)
MCHC RBC AUTO-ENTMCNC: 34.3 G/DL (ref 31–36)
MCV RBC AUTO: 90.3 FL (ref 80–100)
METAMYELOCYTES RELATIVE PERCENT: 1 %
MONOCYTES ABSOLUTE: 0.9 K/UL (ref 0–1.3)
MONOCYTES RELATIVE PERCENT: 9 %
NEUTROPHILS ABSOLUTE: 7.7 K/UL (ref 1.7–7.7)
NEUTROPHILS RELATIVE PERCENT: 72 %
PDW BLD-RTO: 13.9 % (ref 12.4–15.4)
PERFORMED ON: ABNORMAL
PLATELET # BLD: 250 K/UL (ref 135–450)
PMV BLD AUTO: 7.5 FL (ref 5–10.5)
POTASSIUM REFLEX MAGNESIUM: 3.6 MMOL/L (ref 3.5–5.1)
RBC # BLD: 3.46 M/UL (ref 4–5.2)
RBC # BLD: NORMAL 10*6/UL
SODIUM BLD-SCNC: 133 MMOL/L (ref 136–145)
WBC # BLD: 9.5 K/UL (ref 4–11)

## 2019-05-01 PROCEDURE — 80048 BASIC METABOLIC PNL TOTAL CA: CPT

## 2019-05-01 PROCEDURE — 6370000000 HC RX 637 (ALT 250 FOR IP): Performed by: PHYSICIAN ASSISTANT

## 2019-05-01 PROCEDURE — 2580000003 HC RX 258: Performed by: PHYSICIAN ASSISTANT

## 2019-05-01 PROCEDURE — 97110 THERAPEUTIC EXERCISES: CPT

## 2019-05-01 PROCEDURE — 36415 COLL VENOUS BLD VENIPUNCTURE: CPT

## 2019-05-01 PROCEDURE — 97535 SELF CARE MNGMENT TRAINING: CPT

## 2019-05-01 PROCEDURE — 97530 THERAPEUTIC ACTIVITIES: CPT

## 2019-05-01 PROCEDURE — 83036 HEMOGLOBIN GLYCOSYLATED A1C: CPT

## 2019-05-01 PROCEDURE — 85025 COMPLETE CBC W/AUTO DIFF WBC: CPT

## 2019-05-01 PROCEDURE — 6360000002 HC RX W HCPCS: Performed by: PHYSICIAN ASSISTANT

## 2019-05-01 RX ORDER — MELOXICAM 7.5 MG/1
7.5 TABLET ORAL DAILY
Qty: 30 TABLET | Refills: 0 | Status: SHIPPED | OUTPATIENT
Start: 2019-05-01 | End: 2019-05-28 | Stop reason: ALTCHOICE

## 2019-05-01 RX ORDER — FAMOTIDINE 20 MG/1
20 TABLET, FILM COATED ORAL 2 TIMES DAILY
Qty: 60 TABLET | Refills: 3 | COMMUNITY
Start: 2019-05-01

## 2019-05-01 RX ORDER — HYDROCODONE BITARTRATE AND ACETAMINOPHEN 5; 325 MG/1; MG/1
1-2 TABLET ORAL EVERY 4 HOURS PRN
Qty: 28 TABLET | Refills: 0 | Status: SHIPPED | OUTPATIENT
Start: 2019-05-01 | End: 2019-05-04

## 2019-05-01 RX ADMIN — HYDROCODONE BITARTRATE AND ACETAMINOPHEN 2 TABLET: 5; 325 TABLET ORAL at 18:47

## 2019-05-01 RX ADMIN — SODIUM CHLORIDE, PRESERVATIVE FREE 10 ML: 5 INJECTION INTRAVENOUS at 09:15

## 2019-05-01 RX ADMIN — FAMOTIDINE 20 MG: 20 TABLET ORAL at 20:53

## 2019-05-01 RX ADMIN — HYDROCHLOROTHIAZIDE 12.5 MG: 25 TABLET ORAL at 09:14

## 2019-05-01 RX ADMIN — Medication 2 G: at 00:50

## 2019-05-01 RX ADMIN — APIXABAN 2.5 MG: 2.5 TABLET, FILM COATED ORAL at 20:53

## 2019-05-01 RX ADMIN — INSULIN LISPRO 1 UNITS: 100 INJECTION, SOLUTION INTRAVENOUS; SUBCUTANEOUS at 20:54

## 2019-05-01 RX ADMIN — TRAZODONE HYDROCHLORIDE 100 MG: 50 TABLET ORAL at 20:53

## 2019-05-01 RX ADMIN — PROPRANOLOL HYDROCHLORIDE 10 MG: 10 TABLET ORAL at 09:14

## 2019-05-01 RX ADMIN — SODIUM CHLORIDE, PRESERVATIVE FREE 10 ML: 5 INJECTION INTRAVENOUS at 20:56

## 2019-05-01 RX ADMIN — APIXABAN 2.5 MG: 2.5 TABLET, FILM COATED ORAL at 09:14

## 2019-05-01 RX ADMIN — KETOROLAC TROMETHAMINE 15 MG: 30 INJECTION, SOLUTION INTRAMUSCULAR; INTRAVENOUS at 10:44

## 2019-05-01 RX ADMIN — DOCUSATE SODIUM 100 MG: 100 CAPSULE, LIQUID FILLED ORAL at 09:14

## 2019-05-01 RX ADMIN — LORAZEPAM 0.5 MG: 0.5 TABLET ORAL at 14:47

## 2019-05-01 RX ADMIN — LISINOPRIL 10 MG: 10 TABLET ORAL at 09:14

## 2019-05-01 RX ADMIN — HYDROCODONE BITARTRATE AND ACETAMINOPHEN 2 TABLET: 5; 325 TABLET ORAL at 13:52

## 2019-05-01 RX ADMIN — SERTRALINE HYDROCHLORIDE 50 MG: 50 TABLET ORAL at 09:14

## 2019-05-01 RX ADMIN — DOCUSATE SODIUM 100 MG: 100 CAPSULE, LIQUID FILLED ORAL at 20:53

## 2019-05-01 RX ADMIN — HYDROCODONE BITARTRATE AND ACETAMINOPHEN 2 TABLET: 5; 325 TABLET ORAL at 03:58

## 2019-05-01 RX ADMIN — HYDROCODONE BITARTRATE AND ACETAMINOPHEN 2 TABLET: 5; 325 TABLET ORAL at 09:14

## 2019-05-01 RX ADMIN — HYDROCODONE BITARTRATE AND ACETAMINOPHEN 1 TABLET: 5; 325 TABLET ORAL at 23:21

## 2019-05-01 RX ADMIN — FAMOTIDINE 20 MG: 20 TABLET ORAL at 09:14

## 2019-05-01 ASSESSMENT — PAIN DESCRIPTION - ONSET: ONSET: ON-GOING

## 2019-05-01 ASSESSMENT — PAIN SCALES - GENERAL
PAINLEVEL_OUTOF10: 5
PAINLEVEL_OUTOF10: 4
PAINLEVEL_OUTOF10: 7
PAINLEVEL_OUTOF10: 6
PAINLEVEL_OUTOF10: 7
PAINLEVEL_OUTOF10: 5
PAINLEVEL_OUTOF10: 7
PAINLEVEL_OUTOF10: 7
PAINLEVEL_OUTOF10: 5

## 2019-05-01 ASSESSMENT — PAIN DESCRIPTION - PAIN TYPE
TYPE: SURGICAL PAIN

## 2019-05-01 ASSESSMENT — PAIN DESCRIPTION - LOCATION
LOCATION: KNEE

## 2019-05-01 ASSESSMENT — PAIN DESCRIPTION - DESCRIPTORS: DESCRIPTORS: BURNING

## 2019-05-01 ASSESSMENT — PAIN DESCRIPTION - ORIENTATION
ORIENTATION: LEFT

## 2019-05-01 ASSESSMENT — PAIN DESCRIPTION - FREQUENCY: FREQUENCY: CONTINUOUS

## 2019-05-01 NOTE — CARE COORDINATION
[facility]    Referral received from FRANCHESCA Beth MSW with request for home care- SN/PT/OT. Writer has reviewed H&P and is aware pt had a left total knee replacement with Dr. Scar Baca on 4/30/19, see op note. I will continue to follow patient for needs, and arrange [place] [address] services prior to DC. Should pt dc over the weekend please fax facesheet, order, RANDAL, and H&P to [facility].      Criselda Carrington RN CTN with [facility]  QHZ:[phone]

## 2019-05-01 NOTE — CARE COORDINATION
CASE MANAGEMENT INITIAL ASSESSMENT      Reviewed chart and met with patient today, re: order for \"s/p TKR\"  Explained Case Management role/services. Family present:  [facility]  Primary contact information: Deepa Brantley [phone]    Admit date/status: 04- Outpatient in a bed   Diagnosis: Primary osteoarthritis of left knee    Insurance: [address] required for SNF - Y        3 night stay required -  N    Living arrangements, Adls, care needs, prior to admission: She lives with her      Transportation: likely private if home    [address] at home: Per note from [place], [place] on 4-30, she has a Rolling Walker, Cane and Manual Wheelchair at home. Services in the home and/or outpatient, prior to admission: None but she reports that she wants to have home health carfe    PT/OT recs: 24 hour supervision or assist and Outpatient PT/OT. Reina CURRAN DCP stated patient will go home with home health care    Hospital Exemption Notification (HEN): n/a     Barriers to discharge: n/a     Plan/comments: SW spoke with patient and  briefly at bedside as she was working with Jacklyn Neff PT. Patient stated she wants home care and has no preference on the company. She stated a referral could be made to [facility]. Writer verified address, PCP and insurance listed in [facility]. Spoke with Austin Smith with [facility] for the referral. She is aware that per Kizzy Baird RN DCP patient with go home with RN/PT/OT and MD is okay with this.      ECOC on chart for MD whit Mederos MSW, LSW

## 2019-05-01 NOTE — PROGRESS NOTES
Occupational Therapy  Facility/Department: [place] [address] - MED SURG/ORTHO  Daily Treatment Note  NAME: Cary Johnston  : 1940  MRN: [id number]    Date of Service: 2019    Discharge Recommendations:  24 hour supervision or assist  OT Equipment Recommendations  Equipment Needed: No    Assessment   Performance deficits / Impairments: Decreased functional mobility ; Decreased endurance;Decreased high-level IADLs;Decreased ADL status; Decreased balance;Decreased safe awareness  Assessment: Pt agreeable to therapy. Pt requiring CGA for transfers at this time while using a standard walker. Pt able to tolerate standing at sink ~10 min for standing grooming and demonstrated toilet transfer requiring CGA for toilet transfers using a grab bar. Pt will benefit from continued skilled OT per POC. Prognosis: Good  Decision Making: Medium Complexity  Patient Education: Role of OT, Transfer training, ADL training   REQUIRES OT FOLLOW UP: Yes  Activity Tolerance  Activity Tolerance: Patient Tolerated treatment well  Safety Devices  Safety Devices in place: Yes  Type of devices: Left in chair;Chair alarm in place; Patient at risk for falls; All fall risk precautions in place;Nurse notified;Gait belt       Patient Diagnosis(es): The primary encounter diagnosis was Primary osteoarthritis of left knee. 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 has a past medical history of Arthritis, Cancer ([address] High blood pressure, History of blood transfusion, PONV (postoperative nausea and vomiting), Seasonal allergies, and Thyroid disease. has a past surgical history that includes Thyroid surgery; Hysterectomy; Mastectomy; Breast surgery (Right); and Total knee arthroplasty (Left, 2019).     Restrictions  Restrictions/Precautions  Restrictions/Precautions: Weight Bearing, General Precautions, Fall Risk  Required Braces or Orthoses?: Yes  Lower Extremity Weight Bearing Restrictions  Left Lower Extremity Weight Bearing: Weight Bearing As Tolerated  Required Braces or Orthoses  Left Lower Extremity Brace: Knee Immobilizer  LLE Brace Type: When ambulating until able to perform SLR in PT  Subjective   General  Chart Reviewed: Yes  Patient assessed for rehabilitation services?: Yes  Family / Caregiver Present: No  Diagnosis: L TKR   Subjective  Subjective: Patient pleasant and agreeable to OT   General Comment  Comments: Per RN ok for therapy  Vital Signs  Patient Currently in Pain: Denies   Orientation  Orientation  Overall Orientation Status: Within Functional Limits  Objective    ADL  Grooming: Stand by assistance(standing sinkside for oral care, washing hands, and combing hair)  LE Dressing: Moderate assistance(able to don R sock long sitting in bed)  Toileting: Minimal assistance(Min A for clothing management; pt completed pericare )     Balance  Sitting Balance: Supervision  Standing Balance: Contact guard assistance  Standing Balance  Time: ~10 min  Activity: toileting, grooming, bathroom mobility  Comment: SW and gait belt  Functional Mobility  Functional - Mobility Device: Standard Walker  Activity: To/from bathroom  Assist Level: Contact guard assistance  Functional Mobility Comments: verbal cues for safety   Toilet Transfers  Toilet - Technique: Ambulating  Equipment Used: Standard toilet(using grab bar)  Toilet Transfer: Contact guard assistance  Bed mobility  Supine to Sit: Minimal assistance  Sit to Supine: Minimal assistance  Transfers  Sit to stand: Contact guard assistance  Stand to sit: Contact guard assistance     Cognition  Overall Cognitive Status: Exceptions  Arousal/Alertness: Delayed responses to stimuli  Following Commands: Follows one step commands with repetition; Follows one step commands with increased time  Attention Span: Attends with cues to redirect; Difficulty attending to directions  Problem Solving: Assistance required to identify errors made;Assistance required to implement solutions  Initiation: Requires cues for some  Sequencing: Does not require cues     Car Transfers  Car Transfers: pt verbalized understanding of car transfer     Plan   Plan  Times per week: 4-6x a week   Times per day: Daily  Current Treatment Recommendations: Endurance Training, Patient/Caregiver Education & Training, Self-Care / ADL, Home Management Training, Balance Training, Pain Management, Safety Education & Training, Functional Mobility Training    AM-PAC Score     AM-PAC Inpatient Daily Activity Raw Score: 16  AM-PAC Inpatient ADL T-Scale Score : 35.96  ADL Inpatient CMS 0-100% Score: 53.32  ADL Inpatient CMS G-Code Modifier : CK    Goals  Short term goals  Time Frame for Short term goals: by 4 days   Short term goal 1: Verbalize understanding of safe car transfer by 5/2/19 (goal met 5/1)  Short term goal 2: Complete LB dressing with min assistance  Short term goal 3: Complete toilet transfers with supervision   Short term goal 4: Complete grooming task standing at sink with supervision      Therapy Time   Individual Concurrent Group Co-treatment   Time In 1123         Time Out 1155         Minutes 32         Timed Code Treatment Minutes: [address], OTR/L

## 2019-05-01 NOTE — CARE COORDINATION
Met with patient at bedside, discussed role of nurse navigator and gave contact information. Reviewed reasons to call with questions or concerns, importance of TEDS, Incentive spirometer, pain medication, and physical therapy. Pt set up with orthovitals and understands when and how to use. Will follow up with call to patient in a few days.     Grabiel Bales  Orthopedic Nurse Navigator  Phone number: [phone]

## 2019-05-01 NOTE — PLAN OF CARE
Patient able to rate pain on 0-10 scale. Discussed pain control options with patient. Instructed to notify RN as needed if pain increases. Patient verbalized understanding. Will continue to monitor.

## 2019-05-01 NOTE — PROGRESS NOTES
Physical Therapy  Facility/Department: [place] [address] - MED SURG/ORTHO  Daily Treatment Note  NAME: Eladia Hdz  : 1940  MRN: [id number]    Date of Service: 2019    Discharge Recommendations:  24 hour supervision or assist, Outpatient PT   PT Equipment Recommendations  Equipment Needed: No  Other: pt owns RW    Patient Diagnosis(es): The encounter diagnosis was Primary osteoarthritis of left knee. has a past medical history of Arthritis, Cancer ([address] High blood pressure, History of blood transfusion, PONV (postoperative nausea and vomiting), Seasonal allergies, and Thyroid disease. has a past surgical history that includes Thyroid surgery; Hysterectomy; Mastectomy; Breast surgery (Right); and Total knee arthroplasty (Left, 2019). Restrictions  Restrictions/Precautions  Restrictions/Precautions: Weight Bearing, General Precautions, Fall Risk  Required Braces or Orthoses?: Yes  Lower Extremity Weight Bearing Restrictions  Left Lower Extremity Weight Bearing: Weight Bearing As Tolerated  Required Braces or Orthoses  Left Lower Extremity Brace: Knee Immobilizer  LLE Brace Type: When ambulating until able to perform SLR in PT  Subjective   General  Chart Reviewed: Yes  Response To Previous Treatment: Patient with no complaints from previous session. Family / Caregiver Present: Yes  Referring Practitioner: GABY Lewis  Subjective  Subjective: Pt sitting in the chair. Reports 6/10 L knee at rest and after pain medication this morning.    General Comment  Comments: RN cleared pt for therapy          Orientation  Orientation  Overall Orientation Status: Within Functional Limits  Cognition      Objective   Bed mobility  Supine to Sit: Unable to assess  Sit to Supine: Minimal assistance  Transfers  Sit to Stand: Minimal Assistance  Stand to sit: Contact guard assistance  Stand Pivot Transfers: Contact guard assistance  Ambulation  Ambulation?: Yes  WB Status: WBAT LLE  More Ambulation?: No  Ambulation 1  Surface: level tile  Device: Rolling Walker  Other Apparatus: Knee Immobilizer; Left  Assistance: Contact guard assistance;Stand by assistance  Quality of Gait: step to pattern leading with the L. Distance: 76'  Stairs/Curb  Stairs?: Yes  Stairs  # Steps : 1  Rails: None  Curbs: 6\"  Device: Rolling walker  Assistance: Contact guard assistance     Balance  Posture: Fair  Sitting - Static: Good  Sitting - Dynamic: Good  Standing - Static: Good  Standing - Dynamic: Fair;+  Exercises  Straight Leg Raise: x 10 LLE CGA  Quad Sets: x 10 BLE  Heelslides: x 10 LLE supine and seated  Gluteal Sets: x 10 BLE  Knee Short Arc Quad: x 10 LLE  Ankle Pumps: x 12 BLE   AROM RLE (degrees)  RLE AROM: WFL  AROM LLE (degrees)  LLE General AROM: 0-80*  Strength RLE  Strength RLE: WFL  Strength LLE  Comment: Hip and ankle appear WFL; fair quad set                 Assessment   Body structures, Functions, Activity limitations: Decreased functional mobility ; Decreased endurance;Decreased ROM; Decreased strength;Decreased balance;Decreased safe awareness; Increased Pain  Assessment: Pt progressing wtih ambulation distance and ROM. Pt reported high levels of pain but was able to manage step with support of RW. Pt returned to bed at end of session for supine exercises. Pt required min assist to sit to stand from the chair and multiple attempts. Will follow up this afternoon to continue exerciss and progress indep.    Treatment Diagnosis: decreased (I) with mobility s/p left TKA  Specific instructions for Next Treatment: progress mobility as tolerated  Prognosis: Good  Patient Education: Role of PT, safety with mobility, POC, d/c recs, WBAT LLE, knee immobilizer, ther ex; pt verbalized understanding  Barriers to Learning: none  REQUIRES PT FOLLOW UP: Yes  Activity Tolerance  Activity Tolerance: Patient Tolerated treatment well;Patient limited by pain     G-Code     OutComes Score AM-PAC Score             Goals  Short term goals  Time Frame for Short term goals: 3 days (5/3) unless otherwise specified  Short term goal 1: Pt will be indep with bed mobility. Short term goal 2: Pt will be supervision for transfers with RW. Short term goal 3: Pt will be supervision for ambulation 100 ft with RW. Short term goal 4: Pt will negotiate curb step x 2 reps with RW and SBA. Short term goal 5: 5/1: Pt will be indep with TKA protocol exercises. - met  Patient Goals   Patient goals : \"to go home tomorrow\"    Plan    Plan  Times per week: BID   Times per day: Twice a day  Specific instructions for Next Treatment: progress mobility as tolerated  Current Treatment Recommendations: Strengthening, Gait Training, Patient/Caregiver Education & Training, ROM, Stair training, Balance Training, Neuromuscular Re-education, Functional Mobility Training, Endurance Training, Transfer Training, Safety Education & Training, Home Exercise Program  Safety Devices  Type of devices:  All fall risk precautions in place, Call light within reach, Gait belt, Patient at risk for falls, Nurse notified, Bed alarm in place, Left in bed  Restraints  Initially in place: No     Therapy Time   Individual Concurrent Group Co-treatment   Time In 0952         Time Out 1030         Minutes [address], [place]

## 2019-05-01 NOTE — CARE COORDINATION
[facility]    Spoke with pt, spouse and friend at bedside about [facility], all questions answered. Pt agreeable to SN, PT, OT. Per notes Pt has the following DME; rolling walking, cane, and manual wheelchair. Also aware agency will call to schedule SOC within 2 days of DC. Demographics verified, Pt lives at home with spouse. Orders will be faxed to [facility]. Should pt dc over the weekend please fax facesheet, order, RANDAL, and H&P to [facility].       Kori Pack RN CTN with [facility]     Phone:  VAY:[phone]

## 2019-05-01 NOTE — PROGRESS NOTES
AM-PAC Score             Goals  Short term goals  Time Frame for Short term goals: 3 days (5/3) unless otherwise specified  Short term goal 1: Pt will be indep with bed mobility. Short term goal 2: Pt will be supervision for transfers with RW. Short term goal 3: Pt will be supervision for ambulation 100 ft with RW. -met  Short term goal 4: Pt will negotiate curb step x 2 reps with RW and SBA. Short term goal 5: 5/1: Pt will be indep with TKA protocol exercises. - met  Patient Goals   Patient goals : \"to go home tomorrow\"    Plan    Plan  Times per week: BID   Times per day: Twice a day  Specific instructions for Next Treatment: progress mobility as tolerated  Current Treatment Recommendations: Strengthening, Gait Training, Patient/Caregiver Education & Training, ROM, Stair training, Balance Training, Neuromuscular Re-education, Functional Mobility Training, Endurance Training, Transfer Training, Safety Education & Training, Home Exercise Program  Safety Devices  Type of devices:  All fall risk precautions in place, Call light within reach, Gait belt, Patient at risk for falls, Nurse notified, Bed alarm in place, Left in bed  Restraints  Initially in place: No     Therapy Time   Individual Concurrent Group Co-treatment   Time In 1404         Time Out 1430         Minutes Janine Carrasco, PT

## 2019-05-02 VITALS
RESPIRATION RATE: 16 BRPM | TEMPERATURE: 98.6 F | HEIGHT: 63 IN | SYSTOLIC BLOOD PRESSURE: 129 MMHG | BODY MASS INDEX: 28.35 KG/M2 | DIASTOLIC BLOOD PRESSURE: 79 MMHG | HEART RATE: 82 BPM | WEIGHT: 160 LBS | OXYGEN SATURATION: 91 %

## 2019-05-02 LAB
ANION GAP SERPL CALCULATED.3IONS-SCNC: 9 MMOL/L (ref 3–16)
ANION GAP SERPL CALCULATED.3IONS-SCNC: 9 MMOL/L (ref 3–16)
BASOPHILS ABSOLUTE: 0 K/UL (ref 0–0.2)
BASOPHILS RELATIVE PERCENT: 0.3 %
BUN BLDV-MCNC: 10 MG/DL (ref 7–20)
BUN BLDV-MCNC: 9 MG/DL (ref 7–20)
CALCIUM SERPL-MCNC: 7.9 MG/DL (ref 8.3–10.6)
CALCIUM SERPL-MCNC: 8.7 MG/DL (ref 8.3–10.6)
CHLORIDE BLD-SCNC: 88 MMOL/L (ref 99–110)
CHLORIDE BLD-SCNC: 93 MMOL/L (ref 99–110)
CO2: 30 MMOL/L (ref 21–32)
CO2: 31 MMOL/L (ref 21–32)
CREAT SERPL-MCNC: <0.5 MG/DL (ref 0.6–1.2)
CREAT SERPL-MCNC: <0.5 MG/DL (ref 0.6–1.2)
EOSINOPHILS ABSOLUTE: 0 K/UL (ref 0–0.6)
EOSINOPHILS RELATIVE PERCENT: 0.3 %
GFR AFRICAN AMERICAN: >60
GFR AFRICAN AMERICAN: >60
GFR NON-AFRICAN AMERICAN: >60
GFR NON-AFRICAN AMERICAN: >60
GLUCOSE BLD-MCNC: 119 MG/DL (ref 70–99)
GLUCOSE BLD-MCNC: 131 MG/DL (ref 70–99)
GLUCOSE BLD-MCNC: 139 MG/DL (ref 70–99)
GLUCOSE BLD-MCNC: 154 MG/DL (ref 70–99)
HCT VFR BLD CALC: 28.7 % (ref 36–48)
HEMOGLOBIN: 10 G/DL (ref 12–16)
LYMPHOCYTES ABSOLUTE: 1.2 K/UL (ref 1–5.1)
LYMPHOCYTES RELATIVE PERCENT: 15 %
MAGNESIUM: 1.7 MG/DL (ref 1.8–2.4)
MCH RBC QN AUTO: 31.1 PG (ref 26–34)
MCHC RBC AUTO-ENTMCNC: 34.9 G/DL (ref 31–36)
MCV RBC AUTO: 89.3 FL (ref 80–100)
MONOCYTES ABSOLUTE: 1.2 K/UL (ref 0–1.3)
MONOCYTES RELATIVE PERCENT: 15.4 %
NEUTROPHILS ABSOLUTE: 5.5 K/UL (ref 1.7–7.7)
NEUTROPHILS RELATIVE PERCENT: 69 %
PDW BLD-RTO: 13.5 % (ref 12.4–15.4)
PERFORMED ON: ABNORMAL
PERFORMED ON: ABNORMAL
PLATELET # BLD: 220 K/UL (ref 135–450)
PMV BLD AUTO: 7 FL (ref 5–10.5)
POTASSIUM REFLEX MAGNESIUM: 3.2 MMOL/L (ref 3.5–5.1)
POTASSIUM SERPL-SCNC: 3.3 MMOL/L (ref 3.5–5.1)
RBC # BLD: 3.22 M/UL (ref 4–5.2)
SODIUM BLD-SCNC: 128 MMOL/L (ref 136–145)
SODIUM BLD-SCNC: 132 MMOL/L (ref 136–145)
WBC # BLD: 8 K/UL (ref 4–11)

## 2019-05-02 PROCEDURE — 6370000000 HC RX 637 (ALT 250 FOR IP): Performed by: NURSE PRACTITIONER

## 2019-05-02 PROCEDURE — 97110 THERAPEUTIC EXERCISES: CPT

## 2019-05-02 PROCEDURE — 97116 GAIT TRAINING THERAPY: CPT

## 2019-05-02 PROCEDURE — 80048 BASIC METABOLIC PNL TOTAL CA: CPT

## 2019-05-02 PROCEDURE — 6370000000 HC RX 637 (ALT 250 FOR IP): Performed by: PHYSICIAN ASSISTANT

## 2019-05-02 PROCEDURE — 2580000003 HC RX 258: Performed by: PHYSICIAN ASSISTANT

## 2019-05-02 PROCEDURE — 2580000003 HC RX 258: Performed by: NURSE PRACTITIONER

## 2019-05-02 PROCEDURE — 85025 COMPLETE CBC W/AUTO DIFF WBC: CPT

## 2019-05-02 PROCEDURE — 36415 COLL VENOUS BLD VENIPUNCTURE: CPT

## 2019-05-02 PROCEDURE — 97530 THERAPEUTIC ACTIVITIES: CPT

## 2019-05-02 PROCEDURE — 83735 ASSAY OF MAGNESIUM: CPT

## 2019-05-02 RX ORDER — POTASSIUM CHLORIDE 20 MEQ/1
40 TABLET, EXTENDED RELEASE ORAL ONCE
Status: COMPLETED | OUTPATIENT
Start: 2019-05-02 | End: 2019-05-02

## 2019-05-02 RX ORDER — SODIUM CHLORIDE 9 MG/ML
INJECTION, SOLUTION INTRAVENOUS CONTINUOUS
Status: DISPENSED | OUTPATIENT
Start: 2019-05-02 | End: 2019-05-02

## 2019-05-02 RX ADMIN — SODIUM CHLORIDE: 9 INJECTION, SOLUTION INTRAVENOUS at 11:54

## 2019-05-02 RX ADMIN — LISINOPRIL 10 MG: 10 TABLET ORAL at 08:29

## 2019-05-02 RX ADMIN — HYDROCHLOROTHIAZIDE 12.5 MG: 25 TABLET ORAL at 08:29

## 2019-05-02 RX ADMIN — FLUTICASONE PROPIONATE 2 SPRAY: 50 SPRAY, METERED NASAL at 08:30

## 2019-05-02 RX ADMIN — SERTRALINE HYDROCHLORIDE 50 MG: 50 TABLET ORAL at 08:29

## 2019-05-02 RX ADMIN — SODIUM CHLORIDE, PRESERVATIVE FREE 10 ML: 5 INJECTION INTRAVENOUS at 08:30

## 2019-05-02 RX ADMIN — HYDROCODONE BITARTRATE AND ACETAMINOPHEN 1 TABLET: 5; 325 TABLET ORAL at 03:22

## 2019-05-02 RX ADMIN — APIXABAN 2.5 MG: 2.5 TABLET, FILM COATED ORAL at 08:30

## 2019-05-02 RX ADMIN — PROPRANOLOL HYDROCHLORIDE 10 MG: 10 TABLET ORAL at 08:30

## 2019-05-02 RX ADMIN — HYDROCODONE BITARTRATE AND ACETAMINOPHEN 1 TABLET: 5; 325 TABLET ORAL at 08:29

## 2019-05-02 RX ADMIN — FAMOTIDINE 20 MG: 20 TABLET ORAL at 08:29

## 2019-05-02 RX ADMIN — DOCUSATE SODIUM 100 MG: 100 CAPSULE, LIQUID FILLED ORAL at 08:29

## 2019-05-02 RX ADMIN — HYDROCODONE BITARTRATE AND ACETAMINOPHEN 2 TABLET: 5; 325 TABLET ORAL at 14:21

## 2019-05-02 RX ADMIN — SODIUM CHLORIDE, PRESERVATIVE FREE 10 ML: 5 INJECTION INTRAVENOUS at 11:54

## 2019-05-02 RX ADMIN — POTASSIUM CHLORIDE 40 MEQ: 20 TABLET, EXTENDED RELEASE ORAL at 15:51

## 2019-05-02 RX ADMIN — POTASSIUM CHLORIDE 40 MEQ: 20 TABLET, EXTENDED RELEASE ORAL at 11:54

## 2019-05-02 ASSESSMENT — PAIN DESCRIPTION - LOCATION
LOCATION: KNEE

## 2019-05-02 ASSESSMENT — PAIN DESCRIPTION - ORIENTATION
ORIENTATION: LEFT

## 2019-05-02 ASSESSMENT — PAIN SCALES - GENERAL
PAINLEVEL_OUTOF10: 6
PAINLEVEL_OUTOF10: 6
PAINLEVEL_OUTOF10: 5
PAINLEVEL_OUTOF10: 7
PAINLEVEL_OUTOF10: 0

## 2019-05-02 ASSESSMENT — PAIN DESCRIPTION - DESCRIPTORS: DESCRIPTORS: ACHING

## 2019-05-02 ASSESSMENT — PAIN DESCRIPTION - PAIN TYPE
TYPE: SURGICAL PAIN

## 2019-05-02 ASSESSMENT — PAIN DESCRIPTION - FREQUENCY: FREQUENCY: CONTINUOUS

## 2019-05-02 NOTE — FLOWSHEET NOTE
[address] [place] [address] d'c'd from [address] [place] [address]. FA area per hospital policy. DSD applied over insertion site with 2x2 gauze and tegaderm.

## 2019-05-02 NOTE — PROGRESS NOTES
Physical Therapy  Facility/Department: [facility] - MED SURG/ORTHO  Daily Treatment Note  NAME: Genesis Gandhi  : 1940  MRN: [id number]    Date of Service: 2019    Discharge Recommendations:  24 hour supervision or assist, Outpatient PT   PT Equipment Recommendations  Equipment Needed: No  Other: pt owns RW    Patient Diagnosis(es): The primary encounter diagnosis was Primary osteoarthritis of left knee. 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 has a past medical history of Arthritis, Cancer ([address] High blood pressure, History of blood transfusion, PONV (postoperative nausea and vomiting), Seasonal allergies, and Thyroid disease. has a past surgical history that includes Thyroid surgery; Hysterectomy; Mastectomy; Breast surgery (Right); and Total knee arthroplasty (Left, 2019). Restrictions  Restrictions/Precautions  Restrictions/Precautions: Weight Bearing, General Precautions, Fall Risk  Required Braces or Orthoses?: Yes  Lower Extremity Weight Bearing Restrictions  Left Lower Extremity Weight Bearing: Weight Bearing As Tolerated  Required Braces or Orthoses  Left Lower Extremity Brace: Knee Immobilizer  LLE Brace Type: When ambulating until able to perform SLR in PT  Subjective   General  Chart Reviewed: Yes  Response To Previous Treatment: Patient with no complaints from previous session. Family / Caregiver Present: Yes  Referring Practitioner: GABY Pearson  Subjective  Subjective: Pt getting ready for DC. Needing to use the bathroom   General Comment  Comments: RN cleared pt for therapy          Orientation  Orientation  Overall Orientation Status: Within Functional Limits  Cognition      Objective   Bed mobility  Supine to Sit: Minimal assistance  Sit to Supine: Minimal assistance  Scooting: Minimal assistance  Transfers  Sit to Stand: Minimal Assistance; Moderate Assistance  Stand to sit: Contact guard assistance  Stand Pivot Transfers: Contact guard assistance  Ambulation  Ambulation?: Yes  WB Status: WBAT LLE  More Ambulation?: No  Ambulation 1  Surface: level tile  Device: Rolling Walker  Assistance: Contact guard assistance  Quality of Gait: step to pattern leading with the L progressing to step through pattern  Distance: 15' x 2  Stairs/Curb  Stairs?: No     Balance  Posture: Fair  Sitting - Static: Good  Sitting - Dynamic: Good  Standing - Static: Good  Standing - Dynamic: Fair;+  Exercises  Straight Leg Raise: x 10 LLE CGA  Quad Sets: x 10 BLE  Heelslides: x 10 LLE supine   Gluteal Sets: x 10 BLE  Knee Short Arc Quad: x 10 LLE  Ankle Pumps: x 12 BLE   AROM LLE (degrees)  LLE General AROM: 0-70*  Strength RLE  Strength RLE: WFL  Strength LLE  Comment: Hip and ankle appear WFL; fair quad set     Cryotherapy (Minutes\Location): ice pack provided to L knee at end of session for swelling and comfort           Assessment   Body structures, Functions, Activity limitations: Decreased functional mobility ; Decreased endurance;Decreased ROM; Decreased strength;Decreased balance;Decreased safe awareness; Increased Pain  Assessment: Pt limited due to fatigue and required additional assist for sit to stand. Pt implusive since needing to use the bathroom. REquired max assist to change underwear and pants. Pt returned to bed at end of session. Educated attempted, however pt was more distrated on resting and getting ready for DC. Treatment Diagnosis: decreased (I) with mobility s/p left TKA  Specific instructions for Next Treatment: progress mobility as tolerated  Prognosis: Good  Patient Education: Role of PT, safety with mobility, POC, d/c recs, WBAT LLE, ther ex; pt verbalized understanding.  Needs reinforcement  Barriers to Learning: none  REQUIRES PT FOLLOW UP: No  Activity Tolerance  Activity Tolerance: Patient limited by fatigue;Patient limited by pain     G-Code     OutComes Score AM-PAC Score             Goals  Short term goals  Time Frame for Short term goals: 3 days (5/3) unless otherwise specified  Short term goal 1: Pt will be indep with bed mobility. - not met. Requires min-mod assist  Short term goal 2: Pt will be supervision for transfers with RW. - min-mod assist due to fatigue and pain   Short term goal 3: Pt will be supervision for ambulation 100 ft with RW. -met  Short term goal 4: Pt will negotiate curb step x 2 reps with RW and SBA. -pt required CGA for safety. Adaquate for DC  Short term goal 5: 5/1: Pt will be indep with TKA protocol exercises. - met  Patient Goals   Patient goals : \"to go home tomorrow\"    Plan    Plan  Times per week: DC  Times per day: Twice a day  Specific instructions for Next Treatment: progress mobility as tolerated  Current Treatment Recommendations: Strengthening, Gait Training, Patient/Caregiver Education & Training, ROM, Stair training, Balance Training, Neuromuscular Re-education, Functional Mobility Training, Endurance Training, Transfer Training, Safety Education & Training, Home Exercise Program  Safety Devices  Type of devices:  All fall risk precautions in place, Call light within reach, Gait belt, Patient at risk for falls, Nurse notified, Bed alarm in place, Left in bed  Restraints  Initially in place: No     Therapy Time   Individual Concurrent Group Co-treatment   Time In 1450         Time Out 1515         Minutes Elvira Liu, PT

## 2019-05-02 NOTE — DISCHARGE INSTR - COC
Continuity of Care Form    Patient Name: Rey Duval   :  1940  MRN:  [id number]    Admit date:  2019  Discharge date:  ***    Code Status Order: Full Code   Advance Directives:   Advance Care Flowsheet Documentation     Date/Time Healthcare Directive Type of Healthcare Directive Copy in [address] Agent's Name Healthcare Agent's Phone Number    19 1138  Yes, patient has an advance directive for healthcare treatment  Durable power of  for health care;Living will  No, copy requested from family  --  --  --    19 1600  Yes, patient has an advance directive for healthcare treatment  Durable power of  for health care;Living will  No, copy requested from family  Healthcare power of    Shaunna Galindo and daughter Debo Ramos  --          Admitting Physician:  Annika Stafford MD  PCP: Jessica Mcfadden MD    Discharging Nurse: [facility] Unit/Room#: 8933/7643-78  Discharging Unit Phone Number: ***    Emergency Contact:   Extended Emergency Contact Information  Primary Emergency Contact: Estela Woodward  Home Phone: [phone]  Relation: Child  Secondary Emergency Contact: [facility]  Address: [address] [place] [address] Phone: [phone]  Mobile Phone: [phone]  Relation: Spouse    Past Surgical History:  Past Surgical History:   Procedure Laterality Date    BREAST SURGERY Right     mastectomy with removal of lymph nodes    HYSTERECTOMY      MASTECTOMY      THYROID SURGERY      TOTAL KNEE ARTHROPLASTY Left 2019    LEFT TOTAL KNEE MAKOPLASTY WITH ADDUCTOR CANAL BLOCK FOR PAIN CONTROL    MARTIN VAUGHN performed by Annika Stafford MD at [facility]       Immunization History:   Immunization History   Administered Date(s) Administered    Influenza, High Dose (Fluzone 65 yrs and older) 10/10/2018    Pneumococcal 13-valent Conjugate (Melanie Stockett) 2015  Tdap (Boostrix, Adacel) 2011    Zoster Live (Zostavax) 07/10/2009       Active Problems:  Patient Active Problem List   Diagnosis Code    Primary osteoarthritis of left knee M17.12    Primary osteoarthritis of both knees M17.0    Primary osteoarthritis of left shoulder M19.012    Osteoarthritis of left knee M17.12       Isolation/Infection:   Isolation          No Isolation            Nurse Assessment:  Last Vital Signs: /79   Pulse 82   Temp 98.6 °F (37 °C) (Oral)   Resp 16   Ht 5' 2.5\" (1.588 m)   Wt 160 lb (72.6 kg)   SpO2 91%   BMI 28.80 kg/m²     Last documented pain score (0-10 scale): Pain Level: 0(sleeping)  Last Weight:   Wt Readings from Last 1 Encounters:   19 160 lb (72.6 kg)     Mental Status:  {IP PT MENTAL STATUS:}    IV Access:  { RANDAL IV ACCESS:385612112}    Nursing Mobility/ADLs:  Walking   {[facility] DME AQJS:922385809}  Transfer  {[facility] DME SHWB:020611544}  Bathing  {[facility] DME YUFA:077340867}  Dressing  {[facility] DME BNBL:636194288}  Toileting  {[facility] DME LFMJ:153724217}  Feeding  {[facility] DME RBTN:995435610}  Med Admin  {[facility] DME KXGV:579657913}  Med Delivery   { RANDAL MED Delivery:303169796}    Wound Care Documentation and Therapy:        Elimination:  Continence:   · Bowel: {YES / IQ:38747}  · Bladder: {YES / UH:53111}  Urinary Catheter: {Urinary Catheter:822026903}   Colostomy/Ileostomy/Ileal Conduit: {YES / Q}       Date of Last BM: ***    Intake/Output Summary (Last 24 hours) at 2019 1245  Last data filed at 2019 0836  Gross per 24 hour   Intake 360 ml   Output --   Net 360 ml     I/O last 3 completed shifts:   In: 480 [P.O.:480]  Out: -     Safety Concerns:     [address] Safety Concerns:574470646}    Impairments/Disabilities:      [address] Impairments/Disabilities:561532878}    Nutrition Therapy:  Current Nutrition Therapy:   [address] RANDAL Diet List:013691189}    Routes of Feeding: {CHP DME Other Feedings:090800858}  Liquids: {Slp liquid thickness:56554}  Daily Fluid Restriction: {CHP DME Yes amt example:143939011}  Last Modified Barium Swallow with Video (Video Swallowing Test): {Done Not Done HRGI:258135575}    Treatments at the Time of Hospital Discharge:   Respiratory Treatments: ***  Oxygen Therapy:  {Therapy; copd oxygen:55747}  Ventilator:    [address] LONG Vent LQHL:722828227}    Rehab Therapies: Physical Therapy, Occupational Therapy and Skilled Nurse  Weight Bearing Status/Restrictions: [address] CC Weight Bearin}  Other Medical Equipment (for information only, NOT a DME order):  {EQUIPMENT:593783169}  Other Treatments: ***    Patient's personal belongings (please select all that are sent with patient):  {CHP DME Belongings:705050677}    RN SIGNATURE:  {Esignature:826086336}    CASE MANAGEMENT/SOCIAL WORK SECTION    Inpatient Status Date: ***    Readmission Risk Assessment Score:  Readmission Risk              Risk of Unplanned Readmission:        7           Discharging to Facility/ Agency   Name:  [facility] care    Address: [address] [place] [address] [address] [facility], [address] [facility]  Phone: [phone]  Fax: [phone]        / signature: {Esignature:151129448}    PHYSICIAN SECTION    Prognosis: {Prognosis:4342108594}    Condition at Discharge: [address] Shelby Patient Condition:789879544}    Rehab Potential (if transferring to Rehab): {Prognosis:1401957475}    Recommended Labs or Other Treatments After Discharge: ***    Physician Certification: I certify the above information and transfer of Nisreen Roque  is necessary for the continuing treatment of the diagnosis listed and that she requires {Admit to Appropriate Level of Care:42810} for {GREATER/LESS:825915460} 30 days.      Update Admission H&P: {CHP DME Changes in NGGWV:434644463}    PHYSICIAN SIGNATURE:  {Esignature:418947380}

## 2019-05-02 NOTE — PROGRESS NOTES
Department of Orthopedic Surgery  Nurse Practitioner   Progress Note    Subjective:     Post-Operative Day: 2 Status Post left Total Knee Arthroplasty  Systemic or Specific Complaints: No Complaints. Patient resting in bed. Pain well controlled.  at bedside. Pt ready to go home today. Objective:     Patient Vitals for the past 24 hrs:   BP Temp Temp src Pulse Resp SpO2   05/02/19 0738 129/79 98.6 °F (37 °C) Oral 82 16 91 %   05/01/19 2344 -- -- -- 80 -- 92 %   05/01/19 2343 127/80 98.4 °F (36.9 °C) Oral 76 16 90 %   05/01/19 2035 120/76 99.8 °F (37.7 °C) Oral 77 16 92 %       General: alert, appears stated age, cooperative and no distress   Wound: Wound clean and dry no evidence of infection. Motion: Painful range of Motion   DVT Exam: No evidence of DVT seen on physical exam.       Knee swollen but thigh soft to palpation. Moving foot and ankle. Good distal pulses. Data Review  CBC:   Lab Results   Component Value Date    WBC 8.0 05/02/2019    RBC 3.22 05/02/2019    HGB 10.0 05/02/2019    HCT 28.7 05/02/2019     05/02/2019       Assessment:     Status Post left Total Knee Arthroplasty. Doing well postoperatively. Plan:      1: Continues current post-op course : Post-op labs reviewed and stable. Acute blood loss anemia, expected after surgery. Monitor, stable. Hyponatremia noted, worsened overnight. Medicine following. Discussed with medicine; 250 mL IV fluid ordered over 1 hour, discontinued HCTZ, recheck BMP at 1400.  2:  Continue Deep venous thrombosis prophylaxis - Eliquis  3:  Continue physical therapy, OT, WBAT  4:  Continue Pain Control  5:  Discharge home today pending stable Na. Recheck Na tomorrow with [facility]. Patient would like [facility] at discharge. Patient has a walker at home. Scripts sent down to meds to beds and instructions completed, RANDAL completed. Follow up with Dr. Elena Connolly 2 weeks.     Priscila Phan, CNP

## 2019-05-02 NOTE — CARE COORDINATION
[facility]    DC order noted, all docs needed have been faxed to [facility] for home care services. Patient to be seen within 48 hours of DC by home care.     Janes Carterhan  [place] mobile: [phone]  [facility] office: [phone]

## 2019-05-02 NOTE — DISCHARGE SUMMARY
Department of Orthopedic Surgery  Nurse Practitioner   Discharge Summary    The Erika Quick is a 66 y.o. female underwent total knee replacement procedure without complication. Erika Quick was admitted to the floor following Her recovery in the PACU.      Discharge Diagnosis  left Knee Replacement    Current Inpatient Medications    Current Facility-Administered Medications: potassium chloride (KLOR-CON M) extended release tablet 40 mEq, 40 mEq, Oral, Once  0.9 % sodium chloride infusion, , Intravenous, Continuous  cyclobenzaprine (FLEXERIL) tablet 10 mg, 10 mg, Oral, TID PRN  cetirizine (ZYRTEC) tablet 10 mg, 10 mg, Oral, Daily PRN  fluticasone (FLONASE) 50 MCG/ACT nasal spray 2 spray, 2 spray, Nasal, Daily  LORazepam (ATIVAN) tablet 0.5 mg, 0.5 mg, Oral, BID PRN  propranolol (INDERAL) tablet 10 mg, 10 mg, Oral, Daily  traZODone (DESYREL) tablet 100 mg, 100 mg, Oral, Nightly  lactated ringers infusion, , Intravenous, Continuous  sodium chloride flush 0.9 % injection 10 mL, 10 mL, Intravenous, 2 times per day  sodium chloride flush 0.9 % injection 10 mL, 10 mL, Intravenous, PRN  docusate sodium (COLACE) capsule 100 mg, 100 mg, Oral, BID  ondansetron (ZOFRAN) injection 4 mg, 4 mg, Intravenous, Q6H PRN  famotidine (PEPCID) tablet 20 mg, 20 mg, Oral, BID  apixaban (ELIQUIS) tablet 2.5 mg, 2.5 mg, Oral, BID  glucose (GLUTOSE) 40 % oral gel 15 g, 15 g, Oral, PRN  dextrose 50 % solution 12.5 g, 12.5 g, Intravenous, PRN  glucagon (rDNA) injection 1 mg, 1 mg, Intramuscular, PRN  dextrose 5 % solution, 100 mL/hr, Intravenous, PRN  HYDROcodone-acetaminophen (NORCO) 5-325 MG per tablet 1 tablet, 1 tablet, Oral, Q4H PRN **OR** HYDROcodone-acetaminophen (NORCO) 5-325 MG per tablet 2 tablet, 2 tablet, Oral, Q4H PRN  HYDROmorphone (DILAUDID) injection 0.25 mg, 0.25 mg, Intravenous, Q3H PRN **OR** HYDROmorphone (DILAUDID) injection 0.5 mg, 0.5 mg, Intravenous, Q3H PRN  insulin lispro (HUMALOG) injection vial 0-6 Units, 0-6 Units, Subcutaneous, TID WC  insulin lispro (HUMALOG) injection vial 0-3 Units, 0-3 Units, Subcutaneous, Nightly  ketorolac (TORADOL) injection 15 mg, 15 mg, Intravenous, Q6H PRN  lisinopril (PRINIVIL;ZESTRIL) tablet 10 mg, 10 mg, Oral, Daily **AND** [DISCONTINUED] hydrochlorothiazide (HYDRODIURIL) tablet 12.5 mg, 12.5 mg, Oral, Daily  sertraline (ZOLOFT) tablet 50 mg, 50 mg, Oral, Daily    Post-operatively the patients diet was advanced as tolerated and their incision was checked on POD #1. The incision is dressing in place, clean, dry and intact with no signs of infection. The patient remained neurovascularly intact in the lower extremity and had intact pulses distally. Patients calf remained soft and showed no evidence of DVT. The patient was able to move their leg and ankle/foot without any problems post-operatively. Physical therapy and occupational therapy were consulted and began working with the patient post-operatively. The patient progressed with PT/OT as would be expected and continued to improve through their stay. The patients pain was initially controlled with IV medications but we were able to transition to oral pain medications soon after arrival to the floor and their pain remained under good control through their hospital stay. From a medical standpoint the patient remained stable and continued to have the medicine team follow throughout their stay. The patients dressing was changed/incison was checked on day of d/c. The patient will be discharged at this time to Home  with their current diet restrictions and will continue to follow the total knee precautions outlined to them by us and PT/OT. Condition on Discharge: Stable    Plan  Return visit in 2 weeks. .  Patient was instructed on the use of pain medications, the signs and symptoms of infection, and was given our number to call should they have any questions or concerns following discharge.     For opioid prescriptions given

## 2019-05-02 NOTE — PROGRESS NOTES
Physical Therapy  Facility/Department: [facility] [address] - MED SURG/ORTHO  Daily Treatment Note  NAME: Cayetano Mobley  : 1940  MRN: [id number]    Date of Service: 2019    Discharge Recommendations:  24 hour supervision or assist, Outpatient PT   PT Equipment Recommendations  Equipment Needed: No  Other: pt owns RW    Patient Diagnosis(es): The primary encounter diagnosis was Primary osteoarthritis of left knee. 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 has a past medical history of Arthritis, Cancer ([facility] [address] High blood pressure, History of blood transfusion, PONV (postoperative nausea and vomiting), Seasonal allergies, and Thyroid disease. has a past surgical history that includes Thyroid surgery; Hysterectomy; Mastectomy; Breast surgery (Right); and Total knee arthroplasty (Left, 2019). Restrictions  Restrictions/Precautions  Restrictions/Precautions: Weight Bearing, General Precautions, Fall Risk  Required Braces or Orthoses?: Yes  Lower Extremity Weight Bearing Restrictions  Left Lower Extremity Weight Bearing: Weight Bearing As Tolerated  Required Braces or Orthoses  Left Lower Extremity Brace: Knee Immobilizer  LLE Brace Type: When ambulating until able to perform SLR in PT  Subjective   General  Chart Reviewed: Yes  Response To Previous Treatment: Patient with no complaints from previous session. Family / Caregiver Present: Yes  Referring Practitioner: GABY Peterson  Subjective  Subjective: Pt resting in bed upon entry.  Reports good pain control in L knee  General Comment  Comments: RN cleared pt for therapy          Orientation  Orientation  Overall Orientation Status: Within Functional Limits  Cognition      Objective   Bed mobility  Supine to Sit: Minimal assistance  Sit to Supine: Minimal assistance  Scooting: Minimal assistance  Transfers  Sit to Stand: Contact guard assistance  Stand to sit: Contact guard assistance  Stand Pivot Transfers: Contact guard assistance  Ambulation  Ambulation?: Yes  WB Status: WBAT LLE  More Ambulation?: No  Ambulation 1  Surface: level tile  Device: Rolling Walker  Assistance: Stand by assistance;Supervision  Quality of Gait: step to pattern leading with the L progressing to step through pattern  Distance: 150'  Stairs/Curb  Stairs?: No     Balance  Posture: Fair  Sitting - Static: Good  Sitting - Dynamic: Good  Standing - Static: Good  Standing - Dynamic: Fair;+  Exercises  Straight Leg Raise: x 10 LLE CGA  Quad Sets: x 10 BLE  Heelslides: x 10 LLE supine   Gluteal Sets: x 10 BLE  Knee Short Arc Quad: x 10 LLE  Ankle Pumps: x 12 BLE   AROM LLE (degrees)  LLE General AROM: 0-70*  Strength RLE  Strength RLE: WFL  Strength LLE  Comment: Hip and ankle appear WFL; fair quad set     Cryotherapy (Minutes\Location): ice pack provided to L knee at end of session for swelling and comfort           Assessment   Body structures, Functions, Activity limitations: Decreased functional mobility ; Decreased endurance;Decreased ROM; Decreased strength;Decreased balance;Decreased safe awareness; Increased Pain  Assessment: Pt continues to make small gains towards goals and increasing mobility. Needs additional cues due to memory/cognition but was able to compelte supine exercises and increase ambulation wtih improving weight bearing on the L. Recommend home wtih 24 hr assist and OP PT upon DC. Treatment Diagnosis: decreased (I) with mobility s/p left TKA  Specific instructions for Next Treatment: progress mobility as tolerated  Prognosis: Good  Patient Education: Role of PT, safety with mobility, POC, d/c recs, WBAT LLE, ther ex; pt verbalized understanding.  Needs reinforcement  REQUIRES PT FOLLOW UP: Yes  Activity Tolerance  Activity Tolerance: Patient Tolerated treatment well;Patient limited by fatigue     G-Code     OutComes Score                                                  AM-PAC Score

## 2019-05-02 NOTE — FLOWSHEET NOTE
Lt.knee drsg changed. Old drsg with scant amt.bloodu drng. Incision well approximated with dermabond. Leg edematous and ecchymotic. DSD applied with mepilex drsg.

## 2019-05-03 ENCOUNTER — HOSPITAL ENCOUNTER (OUTPATIENT)
Age: 79
Setting detail: SPECIMEN
Discharge: HOME | End: 2019-05-03
Payer: MEDICARE

## 2019-05-03 ENCOUNTER — TELEPHONE (OUTPATIENT)
Dept: ORTHOPEDIC SURGERY | Age: 79
End: 2019-05-03

## 2019-05-03 LAB
ANION GAP SERPL CALCULATED.3IONS-SCNC: 12 MMOL/L (ref 3–16)
BUN BLDV-MCNC: 15 MG/DL (ref 7–20)
CALCIUM SERPL-MCNC: 9.1 MG/DL (ref 8.3–10.6)
CHLORIDE BLD-SCNC: 92 MMOL/L (ref 99–110)
CO2: 30 MMOL/L (ref 21–32)
CREAT SERPL-MCNC: <0.5 MG/DL (ref 0.6–1.2)
GFR AFRICAN AMERICAN: >60
GFR NON-AFRICAN AMERICAN: >60
GLUCOSE BLD-MCNC: 152 MG/DL (ref 70–99)
POTASSIUM SERPL-SCNC: 3.9 MMOL/L (ref 3.5–5.1)
SODIUM BLD-SCNC: 134 MMOL/L (ref 136–145)

## 2019-05-03 PROCEDURE — 80048 BASIC METABOLIC PNL TOTAL CA: CPT

## 2019-05-03 PROCEDURE — 36415 COLL VENOUS BLD VENIPUNCTURE: CPT

## 2019-05-03 NOTE — TELEPHONE ENCOUNTER
Spoke with pt, doing well. Trying to wean back off pain medicine already. Incision status: No drainage or redness    Edema/Swelling/Teds: Swelling is there, icing and elevating. Pain level and status: Doing ok. Use of pain medications: Using 1 every 12 hours, recommended pt take a little more frequently. Blood thinner: Eliquis- no issues    Bowels:     Home Care Agency active: [facility] to start today     Outpatient therapy: NA    Do you have all of your medications: yes    Changes in medications: no    Ortho Vitals: Will put in vitals later today.      Follow up appointments:    Future Appointments   Date Time Provider Renee Almeida   5/14/2019  2:50 PM BRICE Salas AND DAISY

## 2019-05-08 ENCOUNTER — TELEPHONE (OUTPATIENT)
Dept: ORTHOPEDIC SURGERY | Age: 79
End: 2019-05-08

## 2019-05-08 NOTE — TELEPHONE ENCOUNTER
Spoke with pt, doing really good. Getting around well    Incision status: No drainage or redness    Edema/Swelling/Teds: Swelling is going down. Pain level and status: Trying to wean back    Use of pain medications: using 1/2 tab 3 times a day. Still using mobic. Blood thinner: Eliquis no issues. Bowels: Moving fine- took Milk of magnesia. Took linzess today, had BM. Home Care Agency active:  Therapy is going good    Outpatient therapy: NA     Do you have all of your medications: Yes    Changes in medications: no    Ortho Vitals: Using fine     Follow up appointments:    Future Appointments   Date Time Provider Renee Almeida   5/14/2019  2:50 PM BRICE Keller AND DAISY

## 2019-05-14 ENCOUNTER — OFFICE VISIT (OUTPATIENT)
Dept: ORTHOPEDIC SURGERY | Age: 79
End: 2019-05-14

## 2019-05-14 VITALS — WEIGHT: 160.05 LBS | BODY MASS INDEX: 28.36 KG/M2 | HEIGHT: 63 IN

## 2019-05-14 DIAGNOSIS — Z96.652 STATUS POST TOTAL KNEE REPLACEMENT, LEFT: Primary | ICD-10-CM

## 2019-05-14 DIAGNOSIS — N30.00 ACUTE CYSTITIS WITHOUT HEMATURIA: ICD-10-CM

## 2019-05-14 PROCEDURE — 99024 POSTOP FOLLOW-UP VISIT: CPT | Performed by: PHYSICIAN ASSISTANT

## 2019-05-14 NOTE — PROGRESS NOTES
History of present illness: The patient returns today after left total knee replacement. Pain control has been satisfactory with oral medications. There have been no fevers or chills. She is currently doing therapy at home. Pain Assessment  Location of Pain: Knee  Location Modifiers: Left  Severity of Pain: 4  Quality of Pain: Dull, Aching  Duration of Pain: Persistent  Frequency of Pain: Intermittent  Aggravating Factors: Bending, Exercise, Walking, Standing, Squatting, Stairs  Limiting Behavior: Some  Relieving Factors: Rest, Ice, Nsaids]      Physical examination: The incision is clean, dry, and intact with no drainage or signs of infection. Range of motion is 3 degrees of extension to 80 degrees of flexion. There is expected swelling with no evidence of DVT and a negative Homans sign. Neurovascular exam is intact. Radiographs: X-rays taken today including AP, lateral, and skyline views of the Left knee show excellent alignment of the prosthesis. No evidence of septic or aseptic loosening or periprosthetic fractures. Assessment/plan: We would like to begin outpatient physical therapy to restore range of motion and strength. The patient was given local wound care instructions. We did refill their pain medication today. We will followup in 2 weeks for reevaluation. I have encouraged her to get into outpatient physical therapy ASAP.

## 2019-05-15 ENCOUNTER — HOSPITAL ENCOUNTER (OUTPATIENT)
Age: 79
Discharge: HOME | End: 2019-05-15
Payer: MEDICARE

## 2019-05-15 ENCOUNTER — HOSPITAL ENCOUNTER (OUTPATIENT)
Dept: PHYSICAL THERAPY | Age: 79
Setting detail: THERAPIES SERIES
Discharge: HOME | End: 2019-05-15
Payer: MEDICARE

## 2019-05-15 DIAGNOSIS — N30.00 ACUTE CYSTITIS WITHOUT HEMATURIA: ICD-10-CM

## 2019-05-15 LAB
BACTERIA: ABNORMAL /HPF
BILIRUBIN URINE: NEGATIVE
BLOOD, URINE: NEGATIVE
CLARITY: ABNORMAL
COLOR: YELLOW
EPITHELIAL CELLS, UA: ABNORMAL /HPF
GLUCOSE URINE: NEGATIVE MG/DL
KETONES, URINE: ABNORMAL MG/DL
LEUKOCYTE ESTERASE, URINE: ABNORMAL
MICROSCOPIC EXAMINATION: YES
MUCUS: ABNORMAL /LPF
NITRITE, URINE: NEGATIVE
PH UA: 5.5 (ref 5–8)
PROTEIN UA: NEGATIVE MG/DL
RBC UA: ABNORMAL /HPF (ref 0–2)
SPECIFIC GRAVITY UA: 1.01 (ref 1–1.03)
URINE TYPE: ABNORMAL
UROBILINOGEN, URINE: 0.2 E.U./DL
WBC UA: ABNORMAL /HPF (ref 0–5)

## 2019-05-15 PROCEDURE — 87086 URINE CULTURE/COLONY COUNT: CPT

## 2019-05-15 PROCEDURE — 81001 URINALYSIS AUTO W/SCOPE: CPT

## 2019-05-16 ENCOUNTER — TELEPHONE (OUTPATIENT)
Dept: ORTHOPEDIC SURGERY | Age: 79
End: 2019-05-16

## 2019-05-16 LAB — URINE CULTURE, ROUTINE: NORMAL

## 2019-05-16 NOTE — TELEPHONE ENCOUNTER
At patient's last visit she was complaining of increased urinary frequency. We did order her a urine culture and a urinalysis. Urine culture does not suggest infection by urinalysis does. I have referred her back to her primary care physician for further discussion.

## 2019-05-17 ENCOUNTER — HOSPITAL ENCOUNTER (OUTPATIENT)
Dept: PHYSICAL THERAPY | Age: 79
Setting detail: THERAPIES SERIES
Discharge: HOME | End: 2019-05-17
Payer: MEDICARE

## 2019-05-17 PROCEDURE — 97161 PT EVAL LOW COMPLEX 20 MIN: CPT

## 2019-05-17 PROCEDURE — 97016 VASOPNEUMATIC DEVICE THERAPY: CPT

## 2019-05-17 PROCEDURE — 97140 MANUAL THERAPY 1/> REGIONS: CPT

## 2019-05-17 PROCEDURE — 97112 NEUROMUSCULAR REEDUCATION: CPT

## 2019-05-17 PROCEDURE — 97110 THERAPEUTIC EXERCISES: CPT

## 2019-05-17 PROCEDURE — G0283 ELEC STIM OTHER THAN WOUND: HCPCS

## 2019-05-17 NOTE — PLAN OF CARE
[facility], [address]  Phone: [phone]  Fax [phone]     Physical Therapy Certification    Dear Referring Practitioner: Dr Nissa Parikh,    We had the pleasure of evaluating the following patient for physical therapy services at [address] [place] [address]. A summary of our findings can be found in the initial assessment below. This includes our plan of care. If you have any questions or concerns regarding these findings, please do not hesitate to contact me at the office phone number checked above. Thank you for the referral.       Physician Signature:_______________________________Date:__________________  By signing above (or electronic signature), therapists plan is approved by physician    Patient: Salvador Gruber   : 1940   MRN: [id number]  Referring Physician: Referring Practitioner: Dr Nissa Parikh      Evaluation Date: 2019      Medical Diagnosis Information:  Diagnosis: M17.12 (ICD-10-CM) - Primary osteoarthritis of left knee    s/p TKR    DOS   19   Treatment Diagnosis: M25.562 Left Knee Pain, M25.462 Left Knee Edema, M25.662 Left knee Stiffness, R26.2 Difficulty Walking                                         Insurance information: PT Insurance Information: [facility] $35 co-pay       Precautions/ Contra-indications: LBP, Ocassional vertigo, OA, HTN, HX CA, Thyroid DX  Latex Allergy:  [x]NO      []YES  Preferred Language for Healthcare:   [x]English       []other:    SUBJECTIVE: Patient reports undergoing left TKR 2.5 weeks ago after continued pain and instability even with conservative care. Had Pre-op PT Assesment 2019 here where she was taught some home exercises that she tried to do prior to surgery. Has been having a lot of pain post-operatively so has cancelled earlier post-op PT sessions. Dr. Nissa Parikh told her it was important that she start PT ASAP.   Had home [facility] for 2 weeks after her hospital stay where she was able to reach about 80 degrees passive knee flexion. Has been walking with SC fr about several days, but still uses walker on occassion. Has OA in right knee and will probably need a replacement on that side in the future. Has had LBP since recent TKR SX. Only has 2 steps @ home. Relevant Medical History:  Post-op x-rays (5-) show excellent alignment of left TKR prosthesis. Functional Disability Index: LEFS= 66%  Pain Scale: 3/10  Easing factors: rest, CP, ace wrap  Provocative factor: Rising to standing, stand, walk, bending knee, squatting, kneeling, stairs. Type: [x]Constant   []Intermittent  []Radiating []Localized []other:     Numbness/Tingling: mild lateral laith    Occupation/School:Retired/golf    Living Status/Prior Level of Function: Lives with spouse in ranch style home with 2 entry steps. Independent with ADLs . OBJECTIVE:     ROM LEFT RIGHT   HIP Flex     HIP Abd     HIP Ext -10  Tight hip flexors -10 tight hip flexors   HIP IR     HIP ER     Knee ext -11 deg -3   Knee Flex 75 deg 105   Ankle PF     Ankle DF 0 0   Ankle In     Ankle Ev     Strength  LEFT RIGHT   HIP Flexors 3+ 4   HIP Abductors 3+ 4   HIP Ext     Hip ER     Knee EXT (quad) 3+ to4- 4   Knee Flex (HS) 4- 4   Ankle DF     Ankle PF     Ankle Inv     Ankle EV          Circumference  Mid apex  7 cm prox  15 cm distal   43 cm  48 cm  36cm   45 cm  46 cm  36.5     Reflexes/Sensation:    [x]Dermatomes/Myotomes intact    []Reflexes equal and normal bilaterally   []Other:    Joint mobility:    []Normal    [x]Hypo   []Hyper    Palpation: Mild warmth medial knee, min-mod edema. Patient wearing knee high compression sock,but it does not cover knee. Ace wrap over knee. Functional Mobility/Transfers: Mild difficulty with rolling.     Posture: Slight forward flex of trunk    Bandages/Dressings/Incisions: suture removed, but patient still has a mild amount of dark eschar along length of healing incision. No sign of infection. Gait: Pt ambulates with SC. Gait is antalgic with left knee held I semi-flexed position throughout gait cycle. Pt lacks TKE. Tends to rush gait with SC and must be repeatedly instructed to slow down pace. Orthopedic Special Tests: POST-OP, ND                       [x] Patient history, allergies, meds reviewed. Medical chart reviewed. See intake form. Review Of Systems (ROS):  [x]Performed Review of systems (Integumentary, CardioPulmonary, Neurological) by intake and observation. Intake form has been scanned into medical record. Patient has been instructed to contact their primary care physician regarding ROS issues if not already being addressed at this time. Co-morbidities/Complexities (which will affect course of rehabilitation):   []None           Arthritic conditions   []Rheumatoid arthritis (M05.9)  [x]Osteoarthritis (M19.91)   Cardiovascular conditions   [x]Hypertension (I10)  []Hyperlipidemia (E78.5)  []Angina pectoris (I20)  []Atherosclerosis (I70)   Musculoskeletal conditions   []Disc pathology   []Congenital spine pathologies   [x]Prior surgical intervention  []Osteoporosis (M81.8)  []Osteopenia (M85.8)   Endocrine conditions   [x]Hypothyroid (E03.9)  []Hyperthyroid Gastrointestinal conditions   []Constipation (Q12.31)   Metabolic conditions   []Morbid obesity (E66.01)  []Diabetes type 1(E10.65) or 2 (E11.65)   []Neuropathy (G60.9)     Pulmonary conditions   []Asthma (J45)  []Coughing   []COPD (J44.9)   Psychological Disorders  []Anxiety (F41.9)  []Depression (F32.9)   []Other:   [x]Other:   LBP  Hx Breast CA/Masectomy     Barriers to/and or personal factors that will affect rehab potential:              []Age  []Sex              [x]Motivation/Lack of Motivation: pt voices fear w/ therapy.                         [x]Co-Morbidities              []Cognitive Function, education/learning barriers              []Environmental, home barriers []profession/work barriers  []past PT/medical experience  []other:  Justification:     Falls Risk Assessment (30 days):   [x] Falls Risk assessed and no intervention required. [] Falls Risk assessed and Patient requires intervention due to being higher risk   TUG score (>12s at risk):11 sec w/ SC     [x] Falls education provided, including stand and wait to center herself before beginning first step; slow gait down and walk with good gait pattern, remove throw rugs, use hand rails on stairs. G-Codes:   LEFS=66%    ASSESSMENT:   Functional Impairments:     [x]Noted lumbar/proximal hip/LE joint hypomobility   [x]Decreased LE functional ROM   [x]Decreased core/proximal hip strength and neuromuscular control   [x]Decreased LE functional strength   [x]Reduced balance/proprioceptive control   []other:      Functional Activity Limitations (from functional questionnaire and intake)   [x]Reduced ability to tolerate prolonged functional positions   [x]Reduced ability or difficulty with changes of positions or transfers between positions   [x]Reduced ability to maintain good posture and demonstrate good body mechanics with sitting, bending, and lifting   [x]Reduced ability to sleep   [x] Reduced ability or tolerance with driving and/or computer work   [x]Reduced ability to perform lifting, carrying tasks   [x]Reduced ability to squat   [x]Reduced ability to forward bend   [x]Reduced ability to ambulate prolonged functional periods/distances/surfaces   [x]Reduced ability to ascend/descend stairs   [x]Reduced ability to run, hop, cut or jump   []other:    Participation Restrictions   [x]Reduced participation in self care activities   [x]Reduced participation in home management activities   [x]Reduced participation in work activities   [x]Reduced participation in social activities. [x]Reduced participation in sport/recreation activities.     Classification :    [x]Signs/symptoms consistent with post-surgical Weeks:  Interventions:  [x]  Therapeutic exercise including: strength training, ROM, for Lower extremity and core   [x]  NMR activation and proprioception for LE, Glutes and Core   [x]  Manual therapy as indicated for LE, Hip and spine to include: Dry Needling/IASTM, STM, PROM, Gr I-IV mobilizations, manipulation. [x] Modalities as needed that may include: thermal agents, E-stim, Biofeedback, US, iontophoresis as indicated  [x] Patient education on joint protection, postural re-education, activity modification, progression of HEP. HEP instruction: Patient was given written HEP(see scanned forms)    GOALS:  Patient stated goal: Return to playing golf. Therapist goals for Patient:   Short Term Goals: To be achieved in: 2 weeks  1. Independent in HEP and progression per patient tolerance, in order to prevent re-injury. 2. Patient will have a decrease in pain to facilitate improvement in movement, function, and ADLs as indicated by Functional Deficits. Long Term Goals: To be achieved in: 12 weeks  1. Disability index score of 35% or less for the LEFS to assist with reaching prior level of function. 2. Patient will demonstrate increased AROM left knee to 0-105degrees to allow for proper joint functioning as indicated by patients Functional Deficits. 3. Patient will demonstrate an increase in Strength to good proximal hip strength and control, within 5lb HHD in LE to allow for proper functional mobility as indicated by patients Functional Deficits. 4. Patient will ascend and descend a flight of stairs with reciprocal gait without increased symptoms or restriction.    5. Patiet will transition to golf for return to sport training      Electronically signed by:  Amari Lucio, [address]

## 2019-05-17 NOTE — FLOWSHEET NOTE
[facility], [address] [place]  Phone: [phone]  Fax [phone]    Physical Therapy Daily Treatment Note  Date:  2019    Patient Name:  Nisreen Roque    :  1940  MRN: [id number]  Restrictions/Precautions: LBP, Ocassional vertigo, OA, HTN, HX CA, Thyroid DX  Medical/Treatment Diagnosis Information:  · Diagnosis: M17.12 (ICD-10-CM) - Primary osteoarthritis of left knee    s/p TKR    DOS   19  · Treatment Diagnosis: M25.562 Left Knee Pain, M25.462 Left Knee Edema, M25.662 Left knee Stiffness, R26.2 Difficulty Walking  Insurance/Certification information:  PT Insurance Information: [facility] $35 co-pay  Physician Information:  Referring Practitioner: Dr Tatiana Nguyen of care signed (Y/N): due 2019    Date of Patient follow up with Physician:     G-Code (if applicable):      Date G-Code Applied:     LEFS=66%    Progress Note: [x]  Yes  []  No  Next due by: Visit #10       Latex Allergy:  [x]NO      []YES  Preferred Language for Healthcare:   [x]English       []other:    Visit # Insurance Allowable Requires auth   1 BMN    [x]no        []yes:       Pain level:  3/10 left knee     SUBJECTIVE:  See eval    OBJECTIVE: See eval  Observation:   Test measurements:      RESTRICTIONS/PRECAUTIONS: Occasional Vertig.     Exercises/Interventions:     Therapeutic Ex Sets/sec Reps Notes   Bike Rock 5 min  hep   Gastoc Stretch on incline H30x3     Sidestepping @ half wall YTB 1 lap     Seated HS Stretch w/ Calf Stretch w/ strap combo H30x3  hep         SAQ w/ ankle ball Squeeze H5 2x10  hep   SB Bridge H5 2x10     SB knee flex stretch H10x10  hep   Psoas Stretch w/ Knee Flexion H15x5  hep   SLR w/ Q set H2 3x5  hep         EZ stretch 5 min To 88 deg                            Manual Intervention      STM & rolling left quad, IT Band, and calf followed by passive ROM/strtching /patellar JM 15 min NMR re-education      GT w/ SC w/ emphasis TKE 8 min     GT over obstacles with emphasis hp flex 6 min                                 Therapeutic Exercise and NMR EXR  [x] (63888) Provided verbal/tactile cueing for activities related to strengthening, flexibility, endurance, ROM for improvements in LE, proximal hip, and core control with self care, mobility, lifting, ambulation.  [] (70034) Provided verbal/tactile cueing for activities related to improving balance, coordination, kinesthetic sense, posture, motor skill, proprioception  to assist with LE, proximal hip, and core control in self care, mobility, lifting, ambulation and eccentric single leg control.      NMR and Therapeutic Activities:    [] (21056 or 98064) Provided verbal/tactile cueing for activities related to improving balance, coordination, kinesthetic sense, posture, motor skill, proprioception and motor activation to allow for proper function of core, proximal hip and LE with self care and ADLs  [] (99979) Gait Re-education- Provided training and instruction to the patient for proper LE, core and proximal hip recruitment and positioning and eccentric body weight control with ambulation re-education including up and down stairs     Home Exercise Program:    [x] (17725) Reviewed/Progressed HEP activities related to strengthening, flexibility, endurance, ROM of core, proximal hip and LE for functional self-care, mobility, lifting and ambulation/stair navigation   [x] (37428)Reviewed/Progressed HEP activities related to improving balance, coordination, kinesthetic sense, posture, motor skill, proprioception of core, proximal hip and LE for self care, mobility, lifting, and ambulation/stair navigation      Manual Treatments:  PROM / STM / Oscillations-Mobs:  G-I, II, III, IV (PA's, Inf., Post.)  [x] (07349) Provided manual therapy to mobilize LE, proximal hip and/or LS spine soft tissue/joints for the purpose of modulating pain, promoting relaxation,  increasing ROM, reducing/eliminating soft tissue swelling/inflammation/restriction, improving soft tissue extensibility and allowing for proper ROM for normal function with self care, mobility, lifting and ambulation. Modalities:  PM ES/GR V-Com/elevation/ankle pumps x15 min    Charges:  Timed Code Treatment Minutes: 45   Total Treatment Minutes: 80     [x] EVAL (LOW) 72859 (typically 20 minutes face-to-face)  [] EVAL (MOD) 44680 (typically 30 minutes face-to-face)  [] EVAL (HIGH) 23219 (typically 45 minutes face-to-face)  [] RE-EVAL     [x] QI(64698) x  1   [] IONTO  [x] NMR (68530) x  1   [x] VASO  [x] Manual (77260) x  1    [] Other:  [] TA x       [] Mech Traction (66230)  [] ES(attended) (94238)      [x] ES (un) (45083):     Goals:  1. Disability index score of 35% or less for the LEFS to assist with reaching prior level of function. 2. Patient will demonstrate increased AROM left knee to 0-105degrees to allow for proper joint functioning as indicated by patients Functional Deficits. 3. Patient will demonstrate an increase in Strength to good proximal hip strength and control, within 5lb HHD in LE to allow for proper functional mobility as indicated by patients Functional Deficits. 4. Patient will ascend and descend a flight of stairs with reciprocal gait without increased symptoms or restriction. 5. Patiet will transition to golf for return to sport training            Progression Towards Functional goals:  [] Patient is progressing as expected towards functional goals listed. [] Progression is slowed due to complexities listed. [] Progression has been slowed due to co-morbidities.   [x] Plan just implemented, too soon to assess goals progression  [] Other:     ASSESSMENT:  See eval    Treatment/Activity Tolerance:  [x] Patient tolerated treatment well [] Patient limited by fatique  [x] Patient limited by pain  [] Patient limited by other medical complications  [] Other: Prognosis: [x] Good [] Fair  [] Poor    Patient Requires Follow-up: [x] Yes  [] No    PLAN: See eval  [] Continue per plan of care [] Alter current plan (see comments)  [x] Plan of care initiated [] Hold pending MD visit [] Discharge    Electronically signed by: Denver Sandoval, [address]

## 2019-05-20 ENCOUNTER — HOSPITAL ENCOUNTER (OUTPATIENT)
Dept: PHYSICAL THERAPY | Age: 79
Setting detail: THERAPIES SERIES
Discharge: HOME | End: 2019-05-20
Payer: MEDICARE

## 2019-05-20 PROCEDURE — 97112 NEUROMUSCULAR REEDUCATION: CPT

## 2019-05-20 PROCEDURE — 97140 MANUAL THERAPY 1/> REGIONS: CPT

## 2019-05-20 PROCEDURE — 97110 THERAPEUTIC EXERCISES: CPT

## 2019-05-20 PROCEDURE — G0283 ELEC STIM OTHER THAN WOUND: HCPCS

## 2019-05-20 PROCEDURE — 97016 VASOPNEUMATIC DEVICE THERAPY: CPT

## 2019-05-20 NOTE — FLOWSHEET NOTE
[facility], [address] [place]  Phone: [phone]  Fax [phone]    Physical Therapy Daily Treatment Note  Date:  2019    Patient Name:  Cary Johnston    :  1940  MRN: [id number]  Restrictions/Precautions: LBP, Ocassional vertigo, OA, HTN, HX CA, Thyroid DX  Medical/Treatment Diagnosis Information:  · Diagnosis: M17.12 (ICD-10-CM) - Primary osteoarthritis of left knee    s/p TKR    DOS   19  · Treatment Diagnosis: M25.562 Left Knee Pain, M25.462 Left Knee Edema, M25.662 Left knee Stiffness, R26.2 Difficulty Walking  Insurance/Certification information:  PT Insurance Information: [facility] $35 co-pay  Physician Information:  Referring Practitioner: Dr Caroll Ganser of care signed (Y/N): due 2019    Date of Patient follow up with Physician:     G-Code (if applicable):      Date G-Code Applied:     LEFS=66%    Progress Note: []  Yes  [x]  No  Next due by: Visit #10       Latex Allergy:  [x]NO      []YES  Preferred Language for Healthcare:   [x]English       []other:    Visit # Insurance Allowable Requires auth   2 BMN    [x]no        []yes:       Pain level:  3/10 left knee     SUBJECTIVE:  Hasn't had time to do much of her HEP because busy with gradution and  activities    OBJECTIVE:   3 weeks post-op. Pt strongly encouraged to increase compliance with HEP. Observation: Able to make 5 full complete cycles on bike for first time today. Pt required extra manual techniques to make ROM gains today. Required review and practice of correct gait sequence. Progressed TE with good tolerance. Test measurements:  Left knee AROM 0-93. PROM to 95 knee flex. RESTRICTIONS/PRECAUTIONS: Occasional Vertig.   Orthovitals 4 weeks = 2019                     8 weeks= 2019    Exercises/Interventions:     Therapeutic Ex Sets/sec Reps Notes   Bike Rock 5 min  hep   Gastoc Stretch on incline H30x3 Sidestepping @ half wall YTB 1 lap     Seated HS Stretch w/ Calf Stretch w/ strap combo H30x3  hep         Mat's EdgeSAQ w/ ankle ball Squeeze 1. 5#H5 2x10  hep   SB Bridge H5 2x10     SB knee flex stretch H10x10  hep   Psoas Stretch w/ Knee Flexion H15x5  hep   SLR w/ Q set H2 2x10  hep   Hip Abd 1x10     Supine IT Band Stretch w/ Strap H15x3           EZ stretch 5 min To 93 deg                            Manual Intervention      STM & rolling left quad, IT Band, and calf followed by passive ROM/strtching /patellar JM 25 min                                   NMR re-education          GT over obstacles with emphasis hp flex 6 min     Airex WS x20     Airex HR 2x10     LSU  2\" 1x10  4\" 2x10                     Therapeutic Exercise and NMR EXR  [x] (46592) Provided verbal/tactile cueing for activities related to strengthening, flexibility, endurance, ROM for improvements in LE, proximal hip, and core control with self care, mobility, lifting, ambulation.  [] (81915) Provided verbal/tactile cueing for activities related to improving balance, coordination, kinesthetic sense, posture, motor skill, proprioception  to assist with LE, proximal hip, and core control in self care, mobility, lifting, ambulation and eccentric single leg control.      NMR and Therapeutic Activities:    [] (87557 or 25981) Provided verbal/tactile cueing for activities related to improving balance, coordination, kinesthetic sense, posture, motor skill, proprioception and motor activation to allow for proper function of core, proximal hip and LE with self care and ADLs  [x] (12185) Gait Re-education- Provided training and instruction to the patient for proper LE, core and proximal hip recruitment and positioning and eccentric body weight control with ambulation re-education including up and down stairs     Home Exercise Program:    [x] (90762) Reviewed/Progressed HEP activities related to strengthening, flexibility, endurance, ROM of core, proximal hip and LE for functional self-care, mobility, lifting and ambulation/stair navigation   [x] (83314)Reviewed/Progressed HEP activities related to improving balance, coordination, kinesthetic sense, posture, motor skill, proprioception of core, proximal hip and LE for self care, mobility, lifting, and ambulation/stair navigation      Manual Treatments:  PROM / STM / Oscillations-Mobs:  G-I, II, III, IV (PA's, Inf., Post.)  [x] (90289) Provided manual therapy to mobilize LE, proximal hip and/or LS spine soft tissue/joints for the purpose of modulating pain, promoting relaxation,  increasing ROM, reducing/eliminating soft tissue swelling/inflammation/restriction, improving soft tissue extensibility and allowing for proper ROM for normal function with self care, mobility, lifting and ambulation. Modalities:  PM ES/GR V-Com/elevation/ankle pumps x15 min    Charges:  Timed Code Treatment Minutes: 60   Total Treatment Minutes: 75     [] EVAL (LOW) 13131 (typically 20 minutes face-to-face)  [] EVAL (MOD) 83690 (typically 30 minutes face-to-face)  [] EVAL (HIGH) 81320 (typically 45 minutes face-to-face)  [] RE-EVAL     [x] NV(98347) x  2   [] IONTO  [x] NMR (13893) x  1   [x] VASO  [x] Manual (66166) x  1    [] Other:  [] TA x       [] Mech Traction (52332)  [] ES(attended) (25601)      [x] ES (un) (46746):     Goals:  1. Disability index score of 35% or less for the LEFS to assist with reaching prior level of function. 2. Patient will demonstrate increased AROM left knee to 0-105degrees to allow for proper joint functioning as indicated by patients Functional Deficits. 3. Patient will demonstrate an increase in Strength to good proximal hip strength and control, within 5lb HHD in LE to allow for proper functional mobility as indicated by patients Functional Deficits. 4. Patient will ascend and descend a flight of stairs with reciprocal gait without increased symptoms or restriction.    5. Patiet will transition to

## 2019-05-22 ENCOUNTER — APPOINTMENT (OUTPATIENT)
Dept: PHYSICAL THERAPY | Age: 79
End: 2019-05-22
Payer: MEDICARE

## 2019-05-22 DIAGNOSIS — M17.0 PRIMARY OSTEOARTHRITIS OF BOTH KNEES: ICD-10-CM

## 2019-05-23 RX ORDER — MELOXICAM 15 MG/1
TABLET ORAL
Qty: 90 TABLET | Refills: 0 | Status: SHIPPED | OUTPATIENT
Start: 2019-05-23 | End: 2019-08-23 | Stop reason: SDUPTHER

## 2019-05-24 ENCOUNTER — HOSPITAL ENCOUNTER (OUTPATIENT)
Dept: PHYSICAL THERAPY | Age: 79
Setting detail: THERAPIES SERIES
Discharge: HOME | End: 2019-05-24
Payer: MEDICARE

## 2019-05-24 PROCEDURE — 97112 NEUROMUSCULAR REEDUCATION: CPT

## 2019-05-24 PROCEDURE — G0283 ELEC STIM OTHER THAN WOUND: HCPCS

## 2019-05-24 PROCEDURE — 97016 VASOPNEUMATIC DEVICE THERAPY: CPT

## 2019-05-24 PROCEDURE — 97110 THERAPEUTIC EXERCISES: CPT

## 2019-05-24 PROCEDURE — 97140 MANUAL THERAPY 1/> REGIONS: CPT

## 2019-05-24 NOTE — FLOWSHEET NOTE
Ecc.                               Inv.        Soleus Press Bilat. Ecc.                           Inv.                             Ladders                Square               Jump/Hop  Low                      Med.                      High                                                            Modality HV/GR 15' elevate   Initials                             JLW   Time spent one on one (workers comp)    Time spent with PT assistant

## 2019-05-24 NOTE — FLOWSHEET NOTE
hep   SLR w/ Q set H2 2x10  hep   Hip Abd 2x10     Supine IT Band Stretch w/ Strap H15x3     LAQ 3# 3x10     EZ stretch 5 min To 103 deg                            Manual Intervention      STM & rolling left quad, IT Band, and calf followed by passive ROM/strtching /patellar JM 15 min                                   NMR re-education      Airex HR 2x10     LSU  4\" 2x10     Mini Wall Slides w/ Hip Abd OTB H10x10     FSU up and over 2\" 2x10         Therapeutic Exercise and NMR EXR  [x] (81051) Provided verbal/tactile cueing for activities related to strengthening, flexibility, endurance, ROM for improvements in LE, proximal hip, and core control with self care, mobility, lifting, ambulation.  [] (49773) Provided verbal/tactile cueing for activities related to improving balance, coordination, kinesthetic sense, posture, motor skill, proprioception  to assist with LE, proximal hip, and core control in self care, mobility, lifting, ambulation and eccentric single leg control.      NMR and Therapeutic Activities:    [] (08520 or 05355) Provided verbal/tactile cueing for activities related to improving balance, coordination, kinesthetic sense, posture, motor skill, proprioception and motor activation to allow for proper function of core, proximal hip and LE with self care and ADLs  [x] (69463) Gait Re-education- Provided training and instruction to the patient for proper LE, core and proximal hip recruitment and positioning and eccentric body weight control with ambulation re-education including up and down stairs     Home Exercise Program:    [x] (61373) Reviewed/Progressed HEP activities related to strengthening, flexibility, endurance, ROM of core, proximal hip and LE for functional self-care, mobility, lifting and ambulation/stair navigation   [x] (66529)Reviewed/Progressed HEP activities related to improving balance, coordination, kinesthetic sense, posture, motor skill, proprioception of core, proximal hip and LE for self care, mobility, lifting, and ambulation/stair navigation      Manual Treatments:  PROM / STM / Oscillations-Mobs:  G-I, II, III, IV (PA's, Inf., Post.)  [x] (32765) Provided manual therapy to mobilize LE, proximal hip and/or LS spine soft tissue/joints for the purpose of modulating pain, promoting relaxation,  increasing ROM, reducing/eliminating soft tissue swelling/inflammation/restriction, improving soft tissue extensibility and allowing for proper ROM for normal function with self care, mobility, lifting and ambulation. Modalities:  PM ES/GR V-Com/elevation/ankle pumps x15 min    Charges:  Timed Code Treatment Minutes: 50   Total Treatment Minutes: 65     [] EVAL (LOW) 94968 (typically 20 minutes face-to-face)  [] EVAL (MOD) 29844 (typically 30 minutes face-to-face)  [] EVAL (HIGH) 95125 (typically 45 minutes face-to-face)  [] RE-EVAL     [x] CB(12626) x  1   [] IONTO  [x] NMR (16232) x  1   [x] VASO  [x] Manual (90659) x  1    [] Other:  [] TA x       [] Mech Traction (21688)  [] ES(attended) (79071)      [x] ES (un) (17663):     Goals:  Short Term Goals: To be achieved in: 2 weeks  1. Independent in HEP and progression per patient tolerance, in order to prevent re-injury. Improving  2. Patient will have a decrease in pain to facilitate improvement in movement, function, and ADLs as indicated by Functional Deficits.     Long Term Goals: To be achieved in: 12 weeks  1. Disability index score of 35% or less for the LEFS to assist with reaching prior level of function. 2. Patient will demonstrate increased AROM left knee to 0-105 degrees to allow for proper joint functioning as indicated by patients Functional Deficits. PROM to 103 5-.  3. Patient will demonstrate an increase in Strength to good proximal hip strength and control, within 5lb HHD in LE to allow for proper functional mobility as indicated by patients Functional Deficits.    4. Patient will ascend and descend a flight of stairs with reciprocal gait without increased symptoms or restriction. 5. Patiet will transition to golf for return to sport training            Progression Towards Functional goals:  [x] Patient is progressing as expected towards functional goals listed. [] Progression is slowed due to complexities listed. [] Progression has been slowed due to co-morbidities. [] Plan just implemented, too soon to assess goals progression  [] Other:     ASSESSMENT:  Increase ROM. Improved gait by end of RX.     Treatment/Activity Tolerance:  [x] Patient tolerated treatment well [] Patient limited by fatique  [x] Patient limited by pain  [] Patient limited by other medical complications  [] Other:     Prognosis: [x] Good [] Fair  [] Poor    Patient Requires Follow-up: [x] Yes  [] No    PLAN: Cont 2xwk  [x] Continue per plan of care [] Alter current plan (see comments)  [] Plan of care initiated [] Hold pending MD visit [] Discharge    Electronically signed by: Tg Carmen, [address]

## 2019-05-28 ENCOUNTER — APPOINTMENT (OUTPATIENT)
Dept: PHYSICAL THERAPY | Age: 79
End: 2019-05-28
Payer: MEDICARE

## 2019-05-28 ENCOUNTER — OFFICE VISIT (OUTPATIENT)
Dept: ORTHOPEDIC SURGERY | Age: 79
End: 2019-05-28

## 2019-05-28 DIAGNOSIS — Z96.652 STATUS POST TOTAL KNEE REPLACEMENT, LEFT: Primary | ICD-10-CM

## 2019-05-28 PROCEDURE — 99024 POSTOP FOLLOW-UP VISIT: CPT | Performed by: PHYSICIAN ASSISTANT

## 2019-05-28 NOTE — PROGRESS NOTES
History of present illness: The patient returns today after left total knee replacement. Pain control has been satisfactory with oral medications. There have been no fevers or chills. Patient is 4 weeks postop. Pain Assessment  Location of Pain: Knee  Location Modifiers: Left  Severity of Pain: 2  Quality of Pain: Dull, Aching  Duration of Pain: Persistent  Frequency of Pain: Constant  Aggravating Factors: Walking, Standing, Stairs, Bending  Limiting Behavior: Some  Relieving Factors: Rest]    Physical examination: The incision is clean, dry, and intact with no drainage or signs of infection. Range of motion is 0 degrees of extension to 95 degrees of flexion. There is expected swelling with no evidence of DVT and a negative Homans sign. Neurovascular exam is intact. Assessment/plan: We would like to continue outpatient physical therapy to restore range of motion and strength. The patient was given local wound care instructions. We did did not refill their pain medication today. We will followup in 2 weeks with Dr. Clair Villa for reevaluation. At the end of visit patient has asked if she could have a lumbar epidural steroid injection. I feel this is acceptable at this time.

## 2019-05-30 ENCOUNTER — TELEPHONE (OUTPATIENT)
Dept: ORTHOPEDIC SURGERY | Age: 79
End: 2019-05-30

## 2019-05-30 NOTE — TELEPHONE ENCOUNTER
Attempted to contact pt, left voicemail with purpose and call back numberRebeca Low  Orthopedic Nurse Navigator  Phone number: [phone]    Follow up appointments:    Future Appointments   Date Time Provider Renee Almeida   5/31/2019  5:00 PM Jos Rocha, PT [facility] None   6/3/2019  2:00 PM Jos Rocha, PT [facility] None   6/7/2019  1:30 PM Jos Rocha, PT [facility]mundo [facility] AND PT None   6/10/2019  8:30 AM MD ANTHONY Wadsworth AND DAISY

## 2019-05-31 ENCOUNTER — HOSPITAL ENCOUNTER (OUTPATIENT)
Dept: PHYSICAL THERAPY | Age: 79
Setting detail: THERAPIES SERIES
Discharge: HOME | End: 2019-05-31
Payer: MEDICARE

## 2019-05-31 ENCOUNTER — TELEPHONE (OUTPATIENT)
Dept: ORTHOPEDIC SURGERY | Age: 79
End: 2019-05-31

## 2019-05-31 PROCEDURE — 97112 NEUROMUSCULAR REEDUCATION: CPT

## 2019-05-31 PROCEDURE — G0283 ELEC STIM OTHER THAN WOUND: HCPCS

## 2019-05-31 PROCEDURE — 97110 THERAPEUTIC EXERCISES: CPT

## 2019-05-31 PROCEDURE — 97016 VASOPNEUMATIC DEVICE THERAPY: CPT

## 2019-05-31 PROCEDURE — 97140 MANUAL THERAPY 1/> REGIONS: CPT

## 2019-05-31 NOTE — TELEPHONE ENCOUNTER
Nurse Navigator called patient for one final follow up:  Doing well, having back problems, will follow up with Clair Villa to see when she can get a back injection. Knee is doing really well. Pt has no questions or concerns. Signed off from pt. Instructed pt to call RN or [place] office with any issues or concerns.     Jacquelin Morales  Orthopedic Nurse Navigator  Phone number: [phone]

## 2019-05-31 NOTE — FLOWSHEET NOTE
[facility], [address] [place]  Phone: [phone]  Fax [phone]    Physical Therapy Daily Treatment Note  Date:  2019    Patient Name:  Zully Shell    :  1940  MRN: [id number]  Restrictions/Precautions: LBP, Ocassional vertigo, OA, HTN, HX CA, Thyroid DX  Medical/Treatment Diagnosis Information:  · Diagnosis: M17.12 (ICD-10-CM) - Primary osteoarthritis of left knee    s/p TKR    DOS   19  · Treatment Diagnosis: M25.562 Left Knee Pain, M25.462 Left Knee Edema, M25.662 Left knee Stiffness, R26.2 Difficulty Walking  Insurance/Certification information:  PT Insurance Information: [facility]/[employer] $35 co-pay  Physician Information:  Referring Practitioner: Dr Sun Butcher of care signed (Y/N): due 2019    Date of Patient follow up with Physician:     G-Code (if applicable):      Date G-Code Applied:     LEFS=66%    Progress Note: []  Yes  [x]  No  Next due by: Visit #10       Latex Allergy:  [x]NO      []YES  Preferred Language for Healthcare:   [x]English       []other:    Visit # Insurance Allowable Requires auth   4 BMN    [x]no        []yes:       Pain level: 1-2 /10 left knee     SUBJECTIVE: Patient states that her left knee is feeling better, but that she is having increased LBP. Has been riding stationary bike @ home and doing a little more walking. States that she saw Kalyn Paul PA-C three days ago and he was concerned that her left knee was too stiff. Pt states she would like to be placed on IPTX. OBJECTIVE: Last PT RX 1 week ago. / See Orthovitals below. 4 week + 3 days  post-op. Patient encouraged to increase RX frequency to 2xwk. Told she would have to see MD for prescription to RX LB. Instructed to increase compliance w/ left knee ROM TE @ home. Observation: Pt required extra MT to make ROM gains today. Weak bilat gluteus medius w/ Trendelenburg gait when not using SC.   Adde Psoas Release w/ Knee flex stretch and sidestepping w/ TB to HEP. Pt given new handout and TB. Test measurements: After MT and TE ROM left knee increased to -3 to 114 degrees. RESTRICTIONS/PRECAUTIONS: Occasional Vertig. Orthovitals 4 weeks = 5-                     8 weeks= 6-    Exercises/Interventions:     Therapeutic Ex Sets/sec Reps Notes   Bike [place] 5 min  hep   Gastoc Stretch on incline H30x3     Sidestepping @ half wall OTB 2 lap     Seated HS Stretch w/ Calf Stretch w/ strap combo H30x3  hep         Mat's Edge SAQ w/ ankle ball Squeeze 3#H5 2x10  hep   SB Bridge H5 2x10     SB knee flex stretch H10x10  hep   Psoas Stretch w/ Knee Flexion H15x5  hep   SLR w/ Q set H2 2x10  hep   Hip Abd 2x10     Supine IT Band Stretch w/ Strap H15x3     LAQ 3# 3x10     EZ stretch 5 min To 103 deg                            Manual Intervention      STM & rolling left quad, IT Band, and calf followed by passive ROM/strtching /patellar JM 25 min                                   NMR re-education      Airex HR 2x10     LSU  4\" 2x10     Mini Wall Slides w/ Hip Abd OTB H10x10     LSU 4\" 2x10     FSU up and over 4\" 2x10         Therapeutic Exercise and NMR EXR  [x] (01133) Provided verbal/tactile cueing for activities related to strengthening, flexibility, endurance, ROM for improvements in LE, proximal hip, and core control with self care, mobility, lifting, ambulation.  [] (51307) Provided verbal/tactile cueing for activities related to improving balance, coordination, kinesthetic sense, posture, motor skill, proprioception  to assist with LE, proximal hip, and core control in self care, mobility, lifting, ambulation and eccentric single leg control.      NMR and Therapeutic Activities:    [] (93018 or 07158) Provided verbal/tactile cueing for activities related to improving balance, coordination, kinesthetic sense, posture, motor skill, proprioception and motor activation to allow for proper function of core, proximal hip and LE with self care and ADLs  [x] (75082) Gait Re-education- Provided training and instruction to the patient for proper LE, core and proximal hip recruitment and positioning and eccentric body weight control with ambulation re-education including up and down stairs     Home Exercise Program:    [x] (32343) Reviewed/Progressed HEP activities related to strengthening, flexibility, endurance, ROM of core, proximal hip and LE for functional self-care, mobility, lifting and ambulation/stair navigation   [x] (95533)Reviewed/Progressed HEP activities related to improving balance, coordination, kinesthetic sense, posture, motor skill, proprioception of core, proximal hip and LE for self care, mobility, lifting, and ambulation/stair navigation      Manual Treatments:  PROM / STM / Oscillations-Mobs:  G-I, II, III, IV (PA's, Inf., Post.)  [x] (01438) Provided manual therapy to mobilize LE, proximal hip and/or LS spine soft tissue/joints for the purpose of modulating pain, promoting relaxation,  increasing ROM, reducing/eliminating soft tissue swelling/inflammation/restriction, improving soft tissue extensibility and allowing for proper ROM for normal function with self care, mobility, lifting and ambulation. Modalities:  PM ES/GR V-Com/elevation/ankle pumps x15 min    Charges:  Timed Code Treatment Minutes: 65   Total Treatment Minutes: 80     [] EVAL (LOW) 93142 (typically 20 minutes face-to-face)  [] EVAL (MOD) 01400 (typically 30 minutes face-to-face)  [] EVAL (HIGH) 56666 (typically 45 minutes face-to-face)  [] RE-EVAL     [x] ZT(35266) x  1   [] IONTO  [x] NMR (43680) x  1   [x] VASO  [x] Manual (23700) x  2    [] Other:  [] TA x       [] Mech Traction (79890)  [] ES(attended) (85326)      [x] ES (un) (03845):     Goals:  Short Term Goals: To be achieved in: 2 weeks  1. Independent in HEP and progression per patient tolerance, in order to prevent re-injury. Improving  2.  Patient will have a decrease in pain to facilitate improvement in movement, function, and ADLs as indicated by Functional Deficits. Met     Long Term Goals: To be achieved in: 12 weeks  1. Disability index score of 35% or less for the LEFS to assist with reaching prior level of function. 2. Patient will demonstrate increased AROM left knee to 0-105 degrees to allow for proper joint functioning as indicated by patients Functional Deficits. PROM to 114, 5-.  3. Patient will demonstrate an increase in Strength to good proximal hip strength and control, within 5lb HHD in LE to allow for proper functional mobility as indicated by patients Functional Deficits. 4. Patient will ascend and descend a flight of stairs with reciprocal gait without increased symptoms or restriction. 5. Patiet will transition to golf for return to sport training            Progression Towards Functional goals:  [x] Patient is progressing as expected towards functional goals listed. [] Progression is slowed due to complexities listed. [] Progression has been slowed due to co-morbidities. [] Plan just implemented, too soon to assess goals progression  [] Other:     ASSESSMENT:  Weak gluteals and Trendelenburg gait w/o SC. Increase ROM and improving quad strength.     Treatment/Activity Tolerance:  [x] Patient tolerated treatment well [] Patient limited by fatique  [x] Patient limited by pain  [] Patient limited by other medical complications  [] Other:     Prognosis: [x] Good [] Fair  [] Poor    Patient Requires Follow-up: [x] Yes  [] No    PLAN: Increase RX frequency to 2xwk  [x] Continue per plan of care [] Alter current plan (see comments)  [] Plan of care initiated [] Hold pending MD visit [] Discharge    Electronically signed by: Mirian Dangelo, [address]

## 2019-06-03 ENCOUNTER — TELEPHONE (OUTPATIENT)
Dept: ORTHOPEDIC SURGERY | Age: 79
End: 2019-06-03

## 2019-06-03 ENCOUNTER — HOSPITAL ENCOUNTER (OUTPATIENT)
Dept: PHYSICAL THERAPY | Age: 79
Setting detail: THERAPIES SERIES
Discharge: HOME | End: 2019-06-03
Payer: MEDICARE

## 2019-06-03 PROCEDURE — G0283 ELEC STIM OTHER THAN WOUND: HCPCS

## 2019-06-03 PROCEDURE — 97140 MANUAL THERAPY 1/> REGIONS: CPT

## 2019-06-03 PROCEDURE — 97112 NEUROMUSCULAR REEDUCATION: CPT

## 2019-06-03 PROCEDURE — 97016 VASOPNEUMATIC DEVICE THERAPY: CPT

## 2019-06-03 PROCEDURE — 97110 THERAPEUTIC EXERCISES: CPT

## 2019-06-03 NOTE — TELEPHONE ENCOUNTER
Patient left a message requesting a script for pelvic traction    I spoke to [facility] and we have not treated her for the back. I called and explained this to the patient and she told me she sees Dr Anish Morales for this. I told her she could ask this of them since they treat her for the back. She thanked me for the call and info.

## 2019-06-03 NOTE — FLOWSHEET NOTE
[facility], [address] [place]  Phone: [phone]  Fax [phone]    Physical Therapy Daily Treatment Note  Date:  6/3/2019    Patient Name:  Octavio Bryan    :  1940  MRN: [id number]  Restrictions/Precautions: LBP, Ocassional vertigo, OA, HTN, HX CA, Thyroid DX  Medical/Treatment Diagnosis Information:  · Diagnosis: M17.12 (ICD-10-CM) - Primary osteoarthritis of left knee    s/p TKR    DOS   19  · Treatment Diagnosis: M25.562 Left Knee Pain, M25.462 Left Knee Edema, M25.662 Left knee Stiffness, R26.2 Difficulty Walking  Insurance/Certification information:  PT Insurance Information: [facility] $35 co-pay  Physician Information:  Referring Practitioner: Dr Shannen Ortiz of care signed (Y/N): due 2019    Date of Patient follow up with Physician:     G-Code (if applicable):      Date G-Code Applied:     LEFS=66%    Progress Note: []  Yes  [x]  No  Next due by: Visit #10       Latex Allergy:  [x]NO      []YES  Preferred Language for Healthcare:   [x]English       []other:    Visit # Insurance Allowable Requires auth   5 BMN    [x]no        []yes:       Pain level:  2/10 left knee     SUBJECTIVE: Did not have time to do her HEP yesterday. Too busy @ social functions. LB feeling better as her walking is improving. Is requesting PT order for Lumbar Traction from PCP. OBJECTIVE:   4.5 weeks  post-op. Observation:Pt enters clinic w/o SCRebeca Terry is good. Test measurements: After MT and TE ROM left knee increased to 0 to 115 degrees. RESTRICTIONS/PRECAUTIONS: Occasional Vertig.   Orthovitals                      8 weeks= 2019    Exercises/Interventions:     Therapeutic Ex Sets/sec Reps Notes   Bike 5 min  hep   Gastoc Stretch on incline H30x3     Sidestepping @ half wall OTB 2 lap     Seated HS Stretch w/ Calf Stretch w/ strap combo H30x3  hep         Mat's Edge SAQ w/ ankle ball Squeeze 4#H5 3x10 hep   SB Bridge H5 2x10     SB knee flex stretch H10x10  hep   Psoas Stretch w/ Knee Flexion H15x5  hep   SLR w/ Q set 0# H2 2x10  hep   Hip Abd 0# 2x10     Supine IT Band Stretch w/ Strap H15x3     LAQ 3# 3x10     EZ Stretch flexion 5 min 115    ATC See note  Started 6-3-2019         Manual Intervention      STM & rolling left quad, IT Band, and calf followed by passive ROM/strtching /patellar JM 15 min                                   NMR re-education      Single HR 2x10     Mini Wall Slides w/ Hip Abd OTB H10x10     LSU 6\" 2x10     Posterior Disc Slides @ bar 2x10     FSU up and over 4\" 2x10         Therapeutic Exercise and NMR EXR  [x] (56644) Provided verbal/tactile cueing for activities related to strengthening, flexibility, endurance, ROM for improvements in LE, proximal hip, and core control with self care, mobility, lifting, ambulation.  [] (60518) Provided verbal/tactile cueing for activities related to improving balance, coordination, kinesthetic sense, posture, motor skill, proprioception  to assist with LE, proximal hip, and core control in self care, mobility, lifting, ambulation and eccentric single leg control.      NMR and Therapeutic Activities:    [] (61093 or 29322) Provided verbal/tactile cueing for activities related to improving balance, coordination, kinesthetic sense, posture, motor skill, proprioception and motor activation to allow for proper function of core, proximal hip and LE with self care and ADLs  [x] (14001) Gait Re-education- Provided training and instruction to the patient for proper LE, core and proximal hip recruitment and positioning and eccentric body weight control with ambulation re-education including up and down stairs     Home Exercise Program:    [x] (15391) Reviewed/Progressed HEP activities related to strengthening, flexibility, endurance, ROM of core, proximal hip and LE for functional self-care, mobility, lifting and ambulation/stair navigation   [x] (50625)Reviewed/Progressed HEP activities related to improving balance, coordination, kinesthetic sense, posture, motor skill, proprioception of core, proximal hip and LE for self care, mobility, lifting, and ambulation/stair navigation      Manual Treatments:  PROM / STM / Oscillations-Mobs:  G-I, II, III, IV (PA's, Inf., Post.)  [x] (35555) Provided manual therapy to mobilize LE, proximal hip and/or LS spine soft tissue/joints for the purpose of modulating pain, promoting relaxation,  increasing ROM, reducing/eliminating soft tissue swelling/inflammation/restriction, improving soft tissue extensibility and allowing for proper ROM for normal function with self care, mobility, lifting and ambulation. Modalities:  PM ES/GR V-Com/elevation/ankle pumps x15 min    Charges:  Timed Code Treatment Minutes: 60   Total Treatment Minutes: 75     [] EVAL (LOW) 99677 (typically 20 minutes face-to-face)  [] EVAL (MOD) 23864 (typically 30 minutes face-to-face)  [] EVAL (HIGH) 56486 (typically 45 minutes face-to-face)  [] RE-EVAL     [x] CF(50302) x  2   [] IONTO  [x] NMR (55900) x  1   [x] VASO  [x] Manual (68135) x  1    [] Other:  [] TA x       [] Mech Traction (24941)  [] ES(attended) (79410)      [x] ES (un) (26852):     Goals:  Short Term Goals: To be achieved in: 2 weeks  1. Independent in HEP and progression per patient tolerance, in order to prevent re-injury. Improving  2. Patient will have a decrease in pain to facilitate improvement in movement, function, and ADLs as indicated by Functional Deficits. Met     Long Term Goals: To be achieved in: 12 weeks  1. Disability index score of 35% or less for the LEFS to assist with reaching prior level of function. 2. Patient will demonstrate increased AROM left knee to 0-105 degrees to allow for proper joint functioning as indicated by patients Functional Deficits.  PROM to 115, 6-3-2019.  3. Patient will demonstrate an increase in Strength to good proximal hip strength and control, within 5lb HHD in LE to allow for proper functional mobility as indicated by patients Functional Deficits. 4. Patient will ascend and descend a flight of stairs with reciprocal gait without increased symptoms or restriction. Improving  5. Patiet will transition to golf for return to sport training            Progression Towards Functional goals:  [x] Patient is progressing as expected towards functional goals listed. [] Progression is slowed due to complexities listed. [] Progression has been slowed due to co-morbidities. [] Plan just implemented, too soon to assess goals progression  [] Other:     ASSESSMENT:  Encouraged patient to perform daily ROM HEP on left knee.     Treatment/Activity Tolerance:  [x] Patient tolerated treatment well [] Patient limited by fatique  [x] Patient limited by pain  [] Patient limited by other medical complications  [] Other:     Prognosis: [x] Good [] Fair  [] Poor    Patient Requires Follow-up: [x] Yes  [] No    PLAN: Cont 2xwk  [x] Continue per plan of care [] Alter current plan (see comments)  [] Plan of care initiated [] Hold pending MD visit [] Discharge    Electronically signed by: Roosevelt Mehta, [address]

## 2019-06-03 NOTE — FLOWSHEET NOTE
Bilat.                                                Ecc.                                Inv. Hamstring Curls Bilat. Ecc.                                Inv.          Soleus Press Bilat. Ecc.                            Inv.                                Ladders                 Square                Jump/Hop  Low                       Med.                       High                                                               Modality HV/GR 15' elevate HV/GR 15' elevate   Initials                             JLW DTM   Time spent one on one (workers comp)     Time spent with PT assistant

## 2019-06-07 ENCOUNTER — HOSPITAL ENCOUNTER (OUTPATIENT)
Dept: PHYSICAL THERAPY | Age: 79
Setting detail: THERAPIES SERIES
Discharge: HOME | End: 2019-06-07
Payer: MEDICARE

## 2019-06-07 PROCEDURE — 97140 MANUAL THERAPY 1/> REGIONS: CPT

## 2019-06-07 PROCEDURE — 97112 NEUROMUSCULAR REEDUCATION: CPT

## 2019-06-07 PROCEDURE — 97016 VASOPNEUMATIC DEVICE THERAPY: CPT

## 2019-06-07 PROCEDURE — 97110 THERAPEUTIC EXERCISES: CPT

## 2019-06-07 NOTE — FLOWSHEET NOTE
[facility], [address] [place]  Phone: [phone]  Fax [phone]      ATHLETIC TRAINING [address]  Date:  2019    Patient Name:  Shelley Jacome    :  1940  MRN: [id number]  Restrictions/Precautions:    Medical/Treatment Diagnosis Information:  ·  L knee OA s/p L TKR  DOS 19  ·  L knee pain, edema, stiffness, difficulty walking  Physician Information:   Dr. Eran Rodríguez Post-op  8 wks  12 wks 16 wks 20 wks   24 wks                            Activity Log                                                  DOS/DOI: 19                                                   Date: 5/24/19  6/3/19 6/7/19   ATC communication: LBP, sciatica Pressure on shlds at LP, incline NV  Reached 103 flex w/PT today Post EZS: 115 Discussed golf program- flyer given   Bike      Elliptical      Treadmill      Airdyne            EZS  Flex 5'          Gastroc stretch      Soleus stretch      Hamstring stretch      ITB stretch      Hip Flexor stretch      Quad stretch      Adductor stretch            Weight Shifting sp                                fp                                tp      Lateral walking (with/w/o TB)            Balance: PEP/Rama board                     SLS            Star excursion load/explode            Extremity reach UE/LE            Leg Press Goran. (pillow and incline) 60# 3x10 60# 3x10 60# 3x12                     Ecc.                        Inv. 40# 2x10 40# 2x12         Calf Press Goran.                          Ecc.                          Inv.            TAVO   Flex                 ABd  30# R/L 2x10 30# R/L 2x10              ADd                TKE 30# 20x5\" 45# 20x5\" 45# 25x5\"              Ext  30# R/L 2x10 30# R/L 2x10         Steps Up                 Up and Over                 Down                 Lateral                 Rotation            Squats  mini                    wall

## 2019-06-07 NOTE — FLOWSHEET NOTE
[facility], [address] [place]  Phone: [phone]  Fax [phone]    Physical Therapy Daily Treatment Note  Date:  2019    Patient Name:  Loretta Montero    :  1940  MRN: [id number]  Restrictions/Precautions: LBP, Ocassional vertigo, OA, HTN, HX CA, Thyroid DX  Medical/Treatment Diagnosis Information:  · Diagnosis: M17.12 (ICD-10-CM) - Primary osteoarthritis of left knee    s/p TKR    DOS   19  · Treatment Diagnosis: M25.562 Left Knee Pain, M25.462 Left Knee Edema, M25.662 Left knee Stiffness, R26.2 Difficulty Walking  Insurance/Certification information:  PT Insurance Information: [facility] $35 co-pay  Physician Information:  Referring Practitioner: Dr Aracelis Guajardo of care signed (Y/N): due 2019    Date of Patient follow up with Physician:     G-Code (if applicable):      Date G-Code Applied:     LEFS=66%    Progress Note: []  Yes  [x]  No  Next due by: Visit #10       Latex Allergy:  [x]NO      []YES  Preferred Language for Healthcare:   [x]English       []other:    Visit # Insurance Allowable Requires auth   6 BMN    [x]no        []yes:       Pain level:  0-2/10 left knee     SUBJECTIVE: Patient states that she has been getting around fine w/o SC. Is now able to go up and down stairs with reciprocal gait with increase ease, but must still switch back to step-to after about 6 steps. LB still hurting her and will be coming in next week for lumbar evaluation. OBJECTIVE:   5.5 weeks  post-op. Observation: Decrease TTP over IT Band and quad. Patient given Scarosso. Test measurements: Left knee ROM by end of rx 0-117 degrees    RESTRICTIONS/PRECAUTIONS: Occasional Vertig.   Orthovitals                      8 weeks= 2019    Exercises/Interventions:     Therapeutic Ex Sets/sec Reps Notes   Bike 5 min  hep   Gastoc Stretch on incline H30x3     Sidestepping @ half wall OTB 2 lap endurance, ROM of core, proximal hip and LE for functional self-care, mobility, lifting and ambulation/stair navigation   [x] (09278)Reviewed/Progressed HEP activities related to improving balance, coordination, kinesthetic sense, posture, motor skill, proprioception of core, proximal hip and LE for self care, mobility, lifting, and ambulation/stair navigation      Manual Treatments:  PROM / STM / Oscillations-Mobs:  G-I, II, III, IV (PA's, Inf., Post.)  [x] (77900) Provided manual therapy to mobilize LE, proximal hip and/or LS spine soft tissue/joints for the purpose of modulating pain, promoting relaxation,  increasing ROM, reducing/eliminating soft tissue swelling/inflammation/restriction, improving soft tissue extensibility and allowing for proper ROM for normal function with self care, mobility, lifting and ambulation. Modalities:  PM ES/GR V-Com/elevation/ankle pumps x15 min    Charges:  Timed Code Treatment Minutes: 60   Total Treatment Minutes: 75     [] EVAL (LOW) 06905 (typically 20 minutes face-to-face)  [] EVAL (MOD) 36373 (typically 30 minutes face-to-face)  [] EVAL (HIGH) 74834 (typically 45 minutes face-to-face)  [] RE-EVAL     [x] QC(33840) x  2   [] IONTO  [x] NMR (34219) x  1   [x] VASO  [x] Manual (20232) x  1    [] Other:  [] TA x       [] Mech Traction (57752)  [] ES(attended) (35633)      [x] ES (un) (06140):     Goals:  Short Term Goals: To be achieved in: 2 weeks  1. Independent in HEP and progression per patient tolerance, in order to prevent re-injury. Met  2. Patient will have a decrease in pain to facilitate improvement in movement, function, and ADLs as indicated by Functional Deficits. Met     Long Term Goals: To be achieved in: 12 weeks  1. Disability index score of 35% or less for the LEFS to assist with reaching prior level of function.    2. Patient will demonstrate increased AROM left knee to 0-105 degrees to allow for proper joint functioning as indicated by patients Functional Deficits. PROM to 117, 6-7-2019.  3. Patient will demonstrate an increase in Strength to good proximal hip strength and control, within 5lb HHD in LE to allow for proper functional mobility as indicated by patients Functional Deficits. Improving  4. Patient will ascend and descend a flight of stairs with reciprocal gait without increased symptoms or restriction. Improving  5. Patiet will transition to golf for return to sport training            Progression Towards Functional goals:  [x] Patient is progressing as expected towards functional goals listed. [] Progression is slowed due to complexities listed. [] Progression has been slowed due to co-morbidities. [] Plan just implemented, too soon to assess goals progression  [] Other:     ASSESSMENT:  Steady progress in all areas, but still gluteal weakness.     Treatment/Activity Tolerance:  [x] Patient tolerated treatment well [] Patient limited by fatique  [x] Patient limited by pain  [] Patient limited by other medical complications  [] Other:     Prognosis: [x] Good [] Fair  [] Poor    Patient Requires Follow-up: [x] Yes  [] No    PLAN: Cont 2xwk  [x] Continue per plan of care [] Alter current plan (see comments)  [] Plan of care initiated [] Hold pending MD visit [] Discharge    Electronically signed by: Camila Xavier, [address] [address] [facility] [address]

## 2019-06-10 ENCOUNTER — HOSPITAL ENCOUNTER (OUTPATIENT)
Dept: PHYSICAL THERAPY | Age: 79
Setting detail: THERAPIES SERIES
Discharge: HOME | End: 2019-06-10
Payer: MEDICARE

## 2019-06-10 ENCOUNTER — OFFICE VISIT (OUTPATIENT)
Dept: ORTHOPEDIC SURGERY | Age: 79
End: 2019-06-10

## 2019-06-10 DIAGNOSIS — Z96.652 STATUS POST TOTAL KNEE REPLACEMENT, LEFT: Primary | ICD-10-CM

## 2019-06-10 PROCEDURE — 97112 NEUROMUSCULAR REEDUCATION: CPT

## 2019-06-10 PROCEDURE — 97110 THERAPEUTIC EXERCISES: CPT

## 2019-06-10 PROCEDURE — 99024 POSTOP FOLLOW-UP VISIT: CPT | Performed by: ORTHOPAEDIC SURGERY

## 2019-06-10 PROCEDURE — 97140 MANUAL THERAPY 1/> REGIONS: CPT

## 2019-06-10 PROCEDURE — 97016 VASOPNEUMATIC DEVICE THERAPY: CPT

## 2019-06-10 NOTE — PROGRESS NOTES
History of present illness: The patient returns today after left total knee replacement. Pain control has been satisfactory with oral medications. There have been no fevers or chills. Patient is 6 weeks postop. Pain Assessment  Location of Pain: Knee  Location Modifiers: Left  Severity of Pain: 2  Quality of Pain: Dull  Duration of Pain: Persistent  Frequency of Pain: Intermittent  Aggravating Factors: Bending, Exercise, Walking, Stairs  Limiting Behavior: Some  Relieving Factors: Rest]    Physical examination: The incision is clean, dry, and intact with no drainage or signs of infection. Range of motion is 0 degrees of extension to 115 degrees of flexion. There is expected swelling with no evidence of DVT and a negative Homans sign. Neurovascular exam is intact. Assessment/plan: We would like to continue outpatient physical therapy to restore range of motion and strength. Patient can graduate to home exercise program shortly. The patient was given local wound care instructions. We did did not refill their pain medication today. We have discussed predental and pre-invasive procedure antibiotics. We had discussed appropriate types of exercise and maintaining a healthy body weight. Follow-up 1 year for surgery.

## 2019-06-10 NOTE — FLOWSHEET NOTE
orders. Pt given written HEP for equipment @ Member Desk. Test measurements: Left knee ROM by end of rx AROM 0-116 and PROM 0-120 degrees. RESTRICTIONS/PRECAUTIONS: Occasional Vertigo. Orthovitals                      8 weeks= 6-    Exercises/Interventions:     Therapeutic Ex Sets/sec Reps Notes   Bike 5 min  hep   Gastoc Stretch on incline H30x3     Sidestepping @ half wall OTB @ ankles   2 laps     Seated HS Stretch w/ Calf Stretch w/ strap combo H30x3  hep         Mat's Edge SAQ w/ ankle ball Squeeze 4#H5 3x10  hep   SB Bridge H5 2x10     SB knee flex stretch H10x10  hep   Psoas Stretch w/ Knee Flexion H15x5  hep   SLR w/ Q set 1# H2 2x10  hep   SL Hip Abd 2# 2x10     Supine IT Band Stretch w/ Strap H15x3     LAQ # 3x10     EZ Stretch flexion 5 min 120    ATC See note  Started 6-3-2019         Manual Intervention      STM & rolling left quad, IT Band, and calf followed by passive ROM/strtching /patellar JM 15 min                                   NMR re-education      Single HR 2x10     Mini Wall Slides w/ MO Hip Abd OTB H10x10     LSU Laterals 6\" 3x10     Posterior Disc Slides @ bar 2x10     FSU up and back 6\" 2x10         Therapeutic Exercise and NMR EXR  [x] (06517) Provided verbal/tactile cueing for activities related to strengthening, flexibility, endurance, ROM for improvements in LE, proximal hip, and core control with self care, mobility, lifting, ambulation.  [] (69042) Provided verbal/tactile cueing for activities related to improving balance, coordination, kinesthetic sense, posture, motor skill, proprioception  to assist with LE, proximal hip, and core control in self care, mobility, lifting, ambulation and eccentric single leg control.      NMR and Therapeutic Activities:    [] (49352 or 53000) Provided verbal/tactile cueing for activities related to improving balance, coordination, kinesthetic sense, posture, motor skill, proprioception and motor activation to allow for proper function of core, proximal hip and LE with self care and ADLs  [x] (84780) Gait Re-education- Provided training and instruction to the patient for proper LE, core and proximal hip recruitment and positioning and eccentric body weight control with ambulation re-education including up and down stairs     Home Exercise Program:    [x] (72775) Reviewed/Progressed HEP activities related to strengthening, flexibility, endurance, ROM of core, proximal hip and LE for functional self-care, mobility, lifting and ambulation/stair navigation   [x] (39590)Reviewed/Progressed HEP activities related to improving balance, coordination, kinesthetic sense, posture, motor skill, proprioception of core, proximal hip and LE for self care, mobility, lifting, and ambulation/stair navigation      Manual Treatments:  PROM / STM / Oscillations-Mobs:  G-I, II, III, IV (PA's, Inf., Post.)  [x] (78263) Provided manual therapy to mobilize LE, proximal hip and/or LS spine soft tissue/joints for the purpose of modulating pain, promoting relaxation,  increasing ROM, reducing/eliminating soft tissue swelling/inflammation/restriction, improving soft tissue extensibility and allowing for proper ROM for normal function with self care, mobility, lifting and ambulation. Modalities:  PM ES/GR V-Com/elevation/ankle pumps x15 min    Charges:  Timed Code Treatment Minutes: 60   Total Treatment Minutes: 75     [] EVAL (LOW) 26563 (typically 20 minutes face-to-face)  [] EVAL (MOD) 11862 (typically 30 minutes face-to-face)  [] EVAL (HIGH) 54119 (typically 45 minutes face-to-face)  [] RE-EVAL     [x] HW(40110) x  2   [] IONTO  [x] NMR (03834) x  1   [x] VASO  [x] Manual (21469) x  1    [] Other:  [] TA x       [] Mech Traction (69594)  [] ES(attended) (03469)      [x] ES (un) (69703):     Goals:  Short Term Goals: To be achieved in: 2 weeks  1. Independent in HEP and progression per patient tolerance, in order to prevent re-injury. Met  2.  Patient will have

## 2019-06-10 NOTE — FLOWSHEET NOTE
[facility],  [address]  Phone: [phone]  Fax [phone]      ATHLETIC TRAINING [address]  Date:  6/10/2019    Patient Name:  Russ Giron    :  1940  MRN: [id number]  Restrictions/Precautions:    Medical/Treatment Diagnosis Information:  ·  L knee OA s/p L TKR  DOS 19  ·  L knee pain, edema, stiffness, difficulty walking  Physician Information:   Dr. Luda Reeder Post-op  8 wks  12 wks 16 wks 20 wks   24 wks                            Activity Log                                                  DOS/DOI: 19                                                   Date: 5/24/19  6/3/19 6/7/19 6/10/19   ATC communication: LBP, sciatica Pressure on shlds at LP, incline NV  Reached 103 flex w/PT today Post EZS: 115 Discussed golf program- flyer given DC by MD, gave gym HEP today for [employer]  Racquet   Bike       Elliptical       Treadmill       Airdyne              EZS  Flex 5'            Gastroc stretch       Soleus stretch       Hamstring stretch       ITB stretch       Hip Flexor stretch       Quad stretch       Adductor stretch              Weight Shifting sp                                 fp                                 tp       Lateral walking (with/w/o TB)              Balance: PEP/Rama board                      SLS             Star excursion load/explode             Extremity reach UE/LE              Leg Press Goran. (pillow and incline) 60# 3x10 60# 3x10 60# 3x12 60# 3x12                     Ecc.                         Inv. 40# 2x10 40# 2x12 40# R/L 3x10          Calf Press Goran.                           Ecc.                           Inv.              TAVO   Flex                  ABd  30# R/L 2x10 30# R/L 2x10 30# R/L 2x10              ADd                 TKE 30# 20x5\" 45# 20x5\" 45# 25x5\" 45# 20x5\"              Ext  30# R/L 2x10 30# R/L 2x10 30# R/L 2x10          Steps Up Up and Over                  Down                  Lateral                  Rotation              Squats  mini                     wall                    BOSU               Lunges:  Lunge to Balance                      Balance to Lunge                      Walking              Knee Extension Bilat. 10# 2x10                              Ecc.                                  Inv. Hamstring Curls Bilat. 20# 2x10                              Ecc.                                  Inv.              Soleus Press Bilat. Ecc.                              Inv.                                      Ladders                   Square                  Jump/Hop  Low                         Med.                         High                                                                     Modality HV/GR 15' elevate HV/GR 15' elevate HV/GR 15' HV/GR 10'   Initials                             JLW DTM DTM JLW   Time spent one on one (workers comp)       Time spent with PT assistant

## 2019-06-14 ENCOUNTER — APPOINTMENT (OUTPATIENT)
Dept: PHYSICAL THERAPY | Age: 79
End: 2019-06-14
Payer: MEDICARE

## 2019-06-17 ENCOUNTER — HOSPITAL ENCOUNTER (OUTPATIENT)
Dept: PHYSICAL THERAPY | Age: 79
Setting detail: THERAPIES SERIES
Discharge: HOME | End: 2019-06-17
Payer: MEDICARE

## 2019-06-17 PROCEDURE — 97110 THERAPEUTIC EXERCISES: CPT

## 2019-06-17 PROCEDURE — 97162 PT EVAL MOD COMPLEX 30 MIN: CPT

## 2019-06-17 PROCEDURE — 97012 MECHANICAL TRACTION THERAPY: CPT

## 2019-06-17 PROCEDURE — 97140 MANUAL THERAPY 1/> REGIONS: CPT

## 2019-06-17 NOTE — FLOWSHEET NOTE
[facility],  [address] [place]  Phone: [phone]  Fax [phone]    Physical Therapy Daily Treatment Note  Date:  2019    Patient Name:  Lucila Olivares    :  1940  MRN: [id number]  Restrictions/Precautions:   Left TKR 2019 LBP, Ocassional vertigo, OA, HTN, HX CA, Thyroid DX  Medical/Treatment Diagnosis Information:  Diagnosis: M51.36 Lumbar DDD  Treatment Diagnosis: M54.5 Lumbar Pain  Insurance/Certification information:  PT Insurance Information: [facility] (BMN, $35 Co-pay)  Physician Information:  Referring Practitioner: Dr. Erik Robertson PA-C  Plan of care signed (Y/N): due 2019    Date of Patient follow up with Physician:     G-Code (if applicable):      Date G-Code Applied:     Modified Oswestry = 18%    Progress Note: [x]  Yes  []  No  Next due by: Visit #10      Latex Allergy:  [x]NO      []YES  Preferred Language for Healthcare:   [x]English       []other:     Visit # Insurance Allowable Requires auth   1 BMN    [x]no        []yes:     Pain level:   7/10  LB To right buttock    SUBJECTIVE:  See eval    OBJECTIVE: See eval  Observation: Slight anterior rotation right IC. Loss of left knee ROM while away from PT to -5 to 101 deg (from 0-120 deg LV). Test measurements:      RESTRICTIONS/PRECAUTIONS: Left TKR 6.5 weeks ago. Exercises/Interventions:     Therapeutic Ex sets/reps comments   Supine LTR H5x10 hep   Supine SB Piriformis Stretch H10x5 hep   Supine TrA/ pelvic Tilt                    EZ Stretch Left Knee flex 6 min 115                                                Manual Intervention      Prone over 3 pillows: STM/Rolling/Myofascial Release LB and right buttock, follwed by right LE manual distraction, quad and HF stretching, and manual posterior rotation of IC.                                         NMR re-education      Dx Review, Back care Ed, Transfer techniques to decrease stress to LB 10 min hep                         Therapeutic Exercise and NMR EXR  [x] (21673) Provided verbal/tactile cueing for activities related to strengthening, flexibility, endurance, ROM  for improvements in proximal hip and core control with self care, mobility, lifting and ambulation.  [] (71080) Provided verbal/tactile cueing for activities related to improving balance, coordination, kinesthetic sense, posture, motor skill, proprioception  to assist with core control in self care, mobility, lifting, and ambulation. Therapeutic Activities:    [x] (08847 or 97642) Provided verbal/tactile cueing for activities related to improving balance, coordination, kinesthetic sense, posture, motor skill, proprioception and motor activation to allow for proper function  with self care and ADLs  [] (60351) Provided training and instruction to the patient for proper core and proximal hip recruitment and positioning with ambulation re-education     Home Exercise Program:    [x] (86168) Reviewed/Progressed HEP activities related to strengthening, flexibility, endurance, ROM of core, proximal hip and LE for functional self-care, mobility, lifting and ambulation   [] (98406) Reviewed/Progressed HEP activities related to improving balance, coordination, kinesthetic sense, posture, motor skill, proprioception of core, proximal hip and LE for self care, mobility, lifting, and ambulation      Manual Treatments:  PROM / STM / Oscillations-Mobs:  G-I, II, III, IV (PA's, Inf., Post.)  [x] (52516) Provided manual therapy to mobilize proximal hip and LS spine soft tissue/joints for the purpose of modulating pain, promoting relaxation,  increasing ROM, reducing/eliminating soft tissue swelling/inflammation/restriction, improving soft tissue extensibility and allowing for proper ROM for normal function with self care, mobility, lifting and ambulation.      Modalities:   IPTX/ HMP (70#/45\" H/30#/15\" R) x12 min in supine    Charges:  Timed Code Treatment Minutes: 35   Total Treatment Minutes: 80     [] EVAL (LOW) 75103 (typically 20 minutes face-to-face)  [x] EVAL (MOD) 73568 (typically 30 minutes face-to-face)  [] EVAL (HIGH) 01384 (typically 45 minutes face-to-face)  [] RE-EVAL     [x] VK(44032) x  1   [] IONTO  [] NMR (21143) x      [] VASO  [x] Manual (76618) x  1    [x] Other: HMP  [] TA x       [x] Mech Traction (28406)  [] ES(attended) (69536)      [] ES (un) (30974):     Goals:   Short Term Goals: To be achieved in: 2 weeks  1. Independent in HEP and progression per patient tolerance, in order to prevent re-injury. 2. Patient will have a decrease in pain to facilitate improvement in movement, function, and ADLs as indicated by Functional Deficits.     Long Term Goals: To be achieved in: 6-8 weeks  1. Disability index score of 10% or less for the Modified Oswestry  to assist with reaching prior level of function. 2. Patient will demonstrate increased AROM to WNL, good LS mobility, good hip ROM to allow for proper joint functioning as indicated by patients Functional Deficits. 3. Patient will demonstrate an increase in Strength to good proximal hip and core activation to allow for proper functional mobility as indicated by patients Functional Deficits. 4. Patient will ascend and descend a flight of stairs without increased symptoms or restriction. 5.  No increase Right LB & buttock pain w/ community distance walking           Progression Towards Functional goals:  [] Patient is progressing as expected towards functional goals listed. [] Progression is slowed due to complexities listed. [] Progression has been slowed due to co-morbidities.   [x] Plan just implemented, too soon to assess goals progression  [] Other:     ASSESSMENT:  See eval    Treatment/Activity Tolerance:  [x] Patient tolerated treatment well [] Patient limited by fatique  [x] Patient limited by pain  [] Patient limited by other medical complications  [] Other:     Prognosis: [x] Good [] Fair  [] Poor    Patient Requires Follow-up: [x] Yes  [] No    PLAN: See eval  [] Continue per plan of care [] Alter current plan (see comments)  [x] Plan of care initiated [] Hold pending MD visit [] Discharge    Electronically signed by: Luisa Palma, [address]

## 2019-06-17 NOTE — PLAN OF CARE
[facility], [address] [facility]  [address] [place] [address], [address] [place] [address]  Phone: [phone]  Fax [phone]    Physical Therapy Certification    Dear Referring Practitioner: Dr. Christie Carranza PA-C,    We had the pleasure of evaluating the following patient for physical therapy services at [address] [place] [address]. A summary of our findings can be found in the initial assessment below. This includes our plan of care. If you have any questions or concerns regarding these findings, please do not hesitate to contact me at the office phone number checked above. Thank you for the referral.       Physician Signature:_______________________________Date:__________________  By signing above (or electronic signature), therapists plan is approved by physician    Patient: Efren Saini   : 1940   MRN: [id number]  Referring Physician: Referring Practitioner: Dr. Christie Carranza PA-C      Evaluation Date: 2019      Medical Diagnosis Information:  Diagnosis: M51.36 Lumbar DDD   Treatment Diagnosis: M54.5 Lumbar Pain                                         Insurance information: PT Insurance Information: [facility] (BMN, $35 Co-jessica)       Precautions/ Contra-indications: Left TKR 2019 LBP, Ocassional vertigo, OA, HTN, HX CA, Thyroid DX      Latex Allergy:  [x]NO      []YES  Preferred Language for Healthcare:   [x]English       []other:    SUBJECTIVE: Patient reports acute right sided lumbosacral pain after waking up from left TKR surgery approximately 6.5 weeks ago. Describes right sided pain that travels into her right buttock with walking and bending. Has had history of LBP episodes in the past.  Dr. Dede Lujan states he will give her an COURTNEY if therapy doesn't help. Tylenol helps decrease her symptoms about 20%.   Has been trying to do her left TKR rehab on her own, but too busy over weekend to exercise because she was (Integumentary, CardioPulmonary, Neurological) by intake and observation. Intake form has been scanned into medical record. Patient has been instructed to contact their primary care physician regarding ROS issues if not already being addressed at this time. Co-morbidities/Complexities (which will affect course of rehabilitation):   []None           Arthritic conditions   []Rheumatoid arthritis (M05.9)  [x]Osteoarthritis (M19.91)   Cardiovascular conditions   [x]Hypertension (I10)  []Hyperlipidemia (E78.5)  []Angina pectoris (I20)  []Atherosclerosis (I70)   Musculoskeletal conditions   []Disc pathology   []Congenital spine pathologies   [x]Prior surgical intervention  []Osteoporosis (M81.8)  []Osteopenia (M85.8)   Endocrine conditions   [x]Hypothyroid (E03.9)  []Hyperthyroid Gastrointestinal conditions   []Constipation (G85.94)   Metabolic conditions   []Morbid obesity (E66.01)  []Diabetes type 1(E10.65) or 2 (E11.65)   []Neuropathy (G60.9)     Pulmonary conditions   []Asthma (J45)  []Coughing   []COPD (J44.9)   Psychological Disorders  []Anxiety (F41.9)  []Depression (F32.9)   []Other:   [x]Other:     Hx Breast CA/Msectomy     Barriers to/and or personal factors that will affect rehab potential:              []Age  []Sex              []Motivation/Lack of Motivation                        []Co-Morbidities              [x]Cognitive Function, education/learning barriers              []Environmental, home barriers              []profession/work barriers  []past PT/medical experience  []other:  Justification: Recent Left TKR w/ secondary analgic gait, but refuses to use SC. Falls Risk Assessment (30 days):   [x] Falls Risk assessed and no intervention required.   [] Falls Risk assessed and Patient requires intervention due to being higher risk   TUG score (>12s at risk):     [] Falls education provided, including       G-Codes:   Modified Oswestry = 18%    ASSESSMENT:   Functional Impairments:     []Noted lumbar/proximal hip hypomobility   []Noted lumbosacral and/or generalized hypermobility   []Decreased Lumbosacral/hip/LE functional ROM   []Decreased core/proximal hip strength and neuromuscular control    []Decreased LE functional strength    []Abnormal reflexes/sensation/myotomal/dermatomal deficits  []Reduced balance/proprioceptive control    []other:      Functional Activity Limitations (from functional questionnaire and intake)   [x]Reduced ability to tolerate prolonged functional positions   [x]Reduced ability or difficulty with changes of positions or transfers between positions   [x]Reduced ability to maintain good posture and demonstrate good body mechanics with sitting, bending, and lifting   []Reduced ability to sleep   [] Reduced ability or tolerance with driving and/or computer work   [x]Reduced ability to perform lifting, reaching, carrying tasks   [x]Reduced ability to squat   [x]Reduced ability to forward bend   [x]Reduced ability to ambulate prolonged functional periods/distances/surfaces   [x]Reduced ability to ascend/descend stairs   []other:       Participation Restrictions   []Reduced participation in self care activities   [x]Reduced participation in home management activities   []Reduced participation in work activities   [x]Reduced participation in social activities. [x]Reduced participation in sport/recreation activities. Classification:   []Signs/symptoms consistent with Lumbar instability/stabilization subgroup. []Signs/symptoms consistent with Lumbar mobilization/manipulation subgroup, myotomes and dermatomes intact. Meets manipulation criteria.     []Signs/symptoms consistent with Lumbar direction specific/centralization subgroup   [x]Signs/symptoms consistent with Lumbar traction subgroup     [x]Signs/symptoms consistent with lumbar facet dysfunction   []Signs/symptoms consistent with lumbar stenosis type dysfunction   []Signs/symptoms consistent with nerve root involvement [x]  Manual therapy as indicated for Hip complex, LE and spine to include: Dry Needling/IASTM, STM, PROM, Gr I-IV mobilizations, manipulation. [x]  Modalities as needed that may include: thermal agents, E-stim, Biofeedback, US, iontophoresis as indicated  [x]  Patient education on joint protection, postural re-education, activity modification, progression of HEP. HEP instruction: Patient given written HEP (see scanned forms). GOALS:  Patient stated goal: No increase Right LB & buttock pain w/ community distance walking. Therapist goals for Patient:   Short Term Goals: To be achieved in: 2 weeks  1. Independent in HEP and progression per patient tolerance, in order to prevent re-injury. 2. Patient will have a decrease in pain to facilitate improvement in movement, function, and ADLs as indicated by Functional Deficits. Long Term Goals: To be achieved in: 6-8 weeks  1. Disability index score of 10% or less for the Modified Oswestry  to assist with reaching prior level of function. 2. Patient will demonstrate increased AROM to WNL, good LS mobility, good hip ROM to allow for proper joint functioning as indicated by patients Functional Deficits. 3. Patient will demonstrate an increase in Strength to good proximal hip and core activation to allow for proper functional mobility as indicated by patients Functional Deficits. 4. Patient will ascend and descend a flight of stairs without increased symptoms or restriction.    5.  No increase Right LB & buttock pain w/ community distance walking      Electronically signed by:  Cece Larios, [address] [facility] [address]

## 2019-06-19 ENCOUNTER — HOSPITAL ENCOUNTER (OUTPATIENT)
Dept: PHYSICAL THERAPY | Age: 79
Setting detail: THERAPIES SERIES
Discharge: HOME | End: 2019-06-19
Payer: MEDICARE

## 2019-06-19 PROCEDURE — 97110 THERAPEUTIC EXERCISES: CPT | Performed by: PHYSICAL THERAPIST

## 2019-06-19 PROCEDURE — 97140 MANUAL THERAPY 1/> REGIONS: CPT | Performed by: PHYSICAL THERAPIST

## 2019-06-19 NOTE — FLOWSHEET NOTE
right buttock, follwed by right LE manual distraction, quad and HF stretching, and manual posterior rotation of IC; gentle sacral mobs . 20 min                                        NMR re-education      Dx Review, Back care Ed, Transfer techniques to decrease stress to LB  hep                         Therapeutic Exercise and NMR EXR  [x] (58711) Provided verbal/tactile cueing for activities related to strengthening, flexibility, endurance, ROM  for improvements in proximal hip and core control with self care, mobility, lifting and ambulation.  [] (92507) Provided verbal/tactile cueing for activities related to improving balance, coordination, kinesthetic sense, posture, motor skill, proprioception  to assist with core control in self care, mobility, lifting, and ambulation.      Therapeutic Activities:    [x] (69995 or 15911) Provided verbal/tactile cueing for activities related to improving balance, coordination, kinesthetic sense, posture, motor skill, proprioception and motor activation to allow for proper function  with self care and ADLs  [] (40087) Provided training and instruction to the patient for proper core and proximal hip recruitment and positioning with ambulation re-education     Home Exercise Program:    [x] (16658) Reviewed/Progressed HEP activities related to strengthening, flexibility, endurance, ROM of core, proximal hip and LE for functional self-care, mobility, lifting and ambulation   [] (38390) Reviewed/Progressed HEP activities related to improving balance, coordination, kinesthetic sense, posture, motor skill, proprioception of core, proximal hip and LE for self care, mobility, lifting, and ambulation      Manual Treatments:  PROM / STM / Oscillations-Mobs:  G-I, II, III, IV (PA's, Inf., Post.)  [x] (91710) Provided manual therapy to mobilize proximal hip and LS spine soft tissue/joints for the purpose of modulating pain, promoting relaxation,  increasing ROM, reducing/eliminating soft tissue swelling/inflammation/restriction, improving soft tissue extensibility and allowing for proper ROM for normal function with self care, mobility, lifting and ambulation. Modalities:  pt declined     Charges:  Timed Code Treatment Minutes: 40   Total Treatment Minutes: 40     [] EVAL (LOW) 86160 (typically 20 minutes face-to-face)  [] EVAL (MOD) 85691 (typically 30 minutes face-to-face)  [] EVAL (HIGH) 64532 (typically 45 minutes face-to-face)  [] RE-EVAL     [x] HC(34537) x  2   [] IONTO  [] NMR (88246) x      [] VASO  [x] Manual (94653) x  1    [x] Other: HMP  [] TA x       [] Mech Traction (35702)  [] ES(attended) (64816)      [] ES (un) (14565):     Goals:   Short Term Goals: To be achieved in: 2 weeks  1. Independent in HEP and progression per patient tolerance, in order to prevent re-injury. 2. Patient will have a decrease in pain to facilitate improvement in movement, function, and ADLs as indicated by Functional Deficits.     Long Term Goals: To be achieved in: 6-8 weeks  1. Disability index score of 10% or less for the Modified Oswestry  to assist with reaching prior level of function. 2. Patient will demonstrate increased AROM to WNL, good LS mobility, good hip ROM to allow for proper joint functioning as indicated by patients Functional Deficits. 3. Patient will demonstrate an increase in Strength to good proximal hip and core activation to allow for proper functional mobility as indicated by patients Functional Deficits. 4. Patient will ascend and descend a flight of stairs without increased symptoms or restriction. 5.  No increase Right LB & buttock pain w/ community distance walking           Progression Towards Functional goals:  [] Patient is progressing as expected towards functional goals listed. [] Progression is slowed due to complexities listed. [] Progression has been slowed due to co-morbidities.   [x] Plan just implemented, too soon to assess goals progression  [] Other: ASSESSMENT:  Decreased pain after MT    Treatment/Activity Tolerance:  [x] Patient tolerated treatment well [] Patient limited by fatique  [x] Patient limited by pain  [] Patient limited by other medical complications  [] Other:     Prognosis: [x] Good [] Fair  [] Poor    Patient Requires Follow-up: [x] Yes  [] No    PLAN:   [x] Continue per plan of care [] Alter current plan (see comments)  [] Plan of care initiated [] Hold pending MD visit [] Discharge    Electronically signed [place]: [place]  [id number]

## 2019-06-25 ENCOUNTER — APPOINTMENT (OUTPATIENT)
Dept: PHYSICAL THERAPY | Age: 79
End: 2019-06-25
Payer: MEDICARE

## 2019-06-25 ENCOUNTER — HOSPITAL ENCOUNTER (OUTPATIENT)
Dept: WOMENS IMAGING | Age: 79
Discharge: HOME | End: 2019-06-25
Payer: MEDICARE

## 2019-06-25 DIAGNOSIS — Z85.3 HISTORY OF BREAST CANCER: ICD-10-CM

## 2019-06-25 PROCEDURE — G0279 TOMOSYNTHESIS, MAMMO: HCPCS

## 2019-08-01 RX ORDER — AMOXICILLIN AND CLAVULANATE POTASSIUM 875; 125 MG/1; MG/1
1 TABLET, FILM COATED ORAL 2 TIMES DAILY
Qty: 6 TABLET | Refills: 0 | Status: SHIPPED | OUTPATIENT
Start: 2019-08-01 | End: 2019-08-04

## 2019-08-23 DIAGNOSIS — M17.0 PRIMARY OSTEOARTHRITIS OF BOTH KNEES: ICD-10-CM

## 2019-08-26 RX ORDER — MELOXICAM 15 MG/1
TABLET ORAL
Qty: 90 TABLET | Refills: 0 | Status: SHIPPED | OUTPATIENT
Start: 2019-08-26 | End: 2019-11-22 | Stop reason: SDUPTHER

## 2019-08-27 RX ORDER — AMOXICILLIN AND CLAVULANATE POTASSIUM 875; 125 MG/1; MG/1
1 TABLET, FILM COATED ORAL 2 TIMES DAILY
Qty: 6 TABLET | Refills: 0 | Status: SHIPPED | OUTPATIENT
Start: 2019-08-27 | End: 2019-08-30

## 2019-11-22 DIAGNOSIS — M17.0 PRIMARY OSTEOARTHRITIS OF BOTH KNEES: ICD-10-CM

## 2019-11-22 RX ORDER — MELOXICAM 15 MG/1
TABLET ORAL
Qty: 90 TABLET | Refills: 0 | Status: SHIPPED | OUTPATIENT
Start: 2019-11-22 | End: 2020-05-04

## 2020-05-04 RX ORDER — MELOXICAM 15 MG/1
TABLET ORAL
Qty: 90 TABLET | Refills: 0 | Status: SHIPPED | OUTPATIENT
Start: 2020-05-04

## 2020-07-07 ENCOUNTER — HOSPITAL ENCOUNTER (OUTPATIENT)
Dept: WOMENS IMAGING | Age: 80
Discharge: HOME | End: 2020-07-07
Payer: MEDICARE

## 2020-07-07 PROCEDURE — G0279 TOMOSYNTHESIS, MAMMO: HCPCS

## 2021-01-28 ENCOUNTER — IMMUNIZATION (OUTPATIENT)
Dept: PRIMARY CARE CLINIC | Age: 81
End: 2021-01-28
Payer: MEDICARE

## 2021-01-28 PROCEDURE — 0011A PR IMM ADMN SARSCOV2 100 MCG/0.5 ML 1ST DOSE: CPT | Performed by: FAMILY MEDICINE

## 2021-01-28 PROCEDURE — 91301 COVID-19, MODERNA VACCINE 100MCG/0.5ML DOSE: CPT | Performed by: FAMILY MEDICINE

## 2021-02-25 ENCOUNTER — IMMUNIZATION (OUTPATIENT)
Dept: PRIMARY CARE CLINIC | Age: 81
End: 2021-02-25
Payer: MEDICARE

## 2021-02-25 PROCEDURE — 91301 COVID-19, MODERNA VACCINE 100MCG/0.5ML DOSE: CPT | Performed by: FAMILY MEDICINE

## 2021-02-25 PROCEDURE — 0012A PR IMM ADMN SARSCOV2 100 MCG/0.5 ML 2ND DOSE: CPT | Performed by: FAMILY MEDICINE

## 2021-10-07 ENCOUNTER — HOSPITAL ENCOUNTER (OUTPATIENT)
Dept: WOMENS IMAGING | Age: 81
Discharge: HOME | End: 2021-10-07
Payer: MEDICARE

## 2021-10-07 VITALS — HEIGHT: 63 IN | BODY MASS INDEX: 26.58 KG/M2 | WEIGHT: 150 LBS

## 2021-10-07 DIAGNOSIS — C50.911 MALIGNANT NEOPLASM OF RIGHT FEMALE BREAST, UNSPECIFIED ESTROGEN RECEPTOR STATUS, UNSPECIFIED SITE OF BREAST (HCC): ICD-10-CM

## 2021-10-07 PROCEDURE — G0279 TOMOSYNTHESIS, MAMMO: HCPCS

## 2021-11-15 ENCOUNTER — CLINICAL DOCUMENTATION (OUTPATIENT)
Dept: OTHER | Age: 81
End: 2021-11-15

## 2022-04-28 ENCOUNTER — APPOINTMENT (OUTPATIENT)
Dept: GENERAL RADIOLOGY | Age: 82
End: 2022-04-28
Payer: MEDICARE

## 2022-04-28 ENCOUNTER — HOSPITAL ENCOUNTER (EMERGENCY)
Age: 82
Discharge: ANOTHER HOSPITAL | End: 2022-04-29
Attending: EMERGENCY MEDICINE
Payer: MEDICARE

## 2022-04-28 ENCOUNTER — APPOINTMENT (OUTPATIENT)
Dept: CT IMAGING | Age: 82
End: 2022-04-28
Payer: MEDICARE

## 2022-04-28 DIAGNOSIS — D72.829 LEUKOCYTOSIS, UNSPECIFIED TYPE: ICD-10-CM

## 2022-04-28 DIAGNOSIS — C83.35 DIFFUSE LARGE B-CELL LYMPHOMA OF LYMPH NODES OF LOWER EXTREMITY (HCC): ICD-10-CM

## 2022-04-28 DIAGNOSIS — K56.609 SMALL BOWEL OBSTRUCTION (HCC): Primary | ICD-10-CM

## 2022-04-28 LAB
ALBUMIN SERPL-MCNC: 4.5 G/DL (ref 3.4–5)
ALP BLD-CCNC: 79 U/L (ref 40–129)
ALT SERPL-CCNC: 16 U/L (ref 10–40)
ANION GAP SERPL CALCULATED.3IONS-SCNC: 14 MMOL/L (ref 3–16)
AST SERPL-CCNC: 15 U/L (ref 15–37)
BILIRUB SERPL-MCNC: 0.5 MG/DL (ref 0–1)
BILIRUBIN DIRECT: <0.2 MG/DL (ref 0–0.3)
BILIRUBIN URINE: NEGATIVE
BILIRUBIN, INDIRECT: NORMAL MG/DL (ref 0–1)
BLOOD, URINE: NEGATIVE
BUN BLDV-MCNC: 14 MG/DL (ref 7–20)
CALCIUM SERPL-MCNC: 10.9 MG/DL (ref 8.3–10.6)
CHLORIDE BLD-SCNC: 96 MMOL/L (ref 99–110)
CLARITY: CLEAR
CO2: 26 MMOL/L (ref 21–32)
COLOR: YELLOW
CREAT SERPL-MCNC: 0.6 MG/DL (ref 0.6–1.2)
GFR AFRICAN AMERICAN: >60
GFR NON-AFRICAN AMERICAN: >60
GLUCOSE BLD-MCNC: 132 MG/DL (ref 70–99)
GLUCOSE URINE: NEGATIVE MG/DL
INR BLD: 1.08 (ref 0.88–1.12)
KETONES, URINE: NEGATIVE MG/DL
LEUKOCYTE ESTERASE, URINE: NEGATIVE
LIPASE: 11 U/L (ref 13–60)
MAGNESIUM: 2 MG/DL (ref 1.8–2.4)
MICROSCOPIC EXAMINATION: NORMAL
NITRITE, URINE: NEGATIVE
PH UA: 6.5 (ref 5–8)
POTASSIUM REFLEX MAGNESIUM: 3.3 MMOL/L (ref 3.5–5.1)
PROCALCITONIN: 0.06 NG/ML (ref 0–0.15)
PROTEIN UA: NEGATIVE MG/DL
PROTHROMBIN TIME: 12.3 SEC (ref 9.9–12.7)
SODIUM BLD-SCNC: 136 MMOL/L (ref 136–145)
SPECIFIC GRAVITY UA: 1.02 (ref 1–1.03)
TOTAL PROTEIN: 7 G/DL (ref 6.4–8.2)
TROPONIN: <0.01 NG/ML
URINE REFLEX TO CULTURE: NORMAL
URINE TYPE: NORMAL
UROBILINOGEN, URINE: 0.2 E.U./DL

## 2022-04-28 PROCEDURE — 93005 ELECTROCARDIOGRAM TRACING: CPT | Performed by: EMERGENCY MEDICINE

## 2022-04-28 PROCEDURE — 83690 ASSAY OF LIPASE: CPT

## 2022-04-28 PROCEDURE — 96375 TX/PRO/DX INJ NEW DRUG ADDON: CPT

## 2022-04-28 PROCEDURE — 84484 ASSAY OF TROPONIN QUANT: CPT

## 2022-04-28 PROCEDURE — 74018 RADEX ABDOMEN 1 VIEW: CPT

## 2022-04-28 PROCEDURE — 80048 BASIC METABOLIC PNL TOTAL CA: CPT

## 2022-04-28 PROCEDURE — 74176 CT ABD & PELVIS W/O CONTRAST: CPT

## 2022-04-28 PROCEDURE — 85610 PROTHROMBIN TIME: CPT

## 2022-04-28 PROCEDURE — 85025 COMPLETE CBC W/AUTO DIFF WBC: CPT

## 2022-04-28 PROCEDURE — 6360000002 HC RX W HCPCS: Performed by: PHYSICIAN ASSISTANT

## 2022-04-28 PROCEDURE — 99285 EMERGENCY DEPT VISIT HI MDM: CPT

## 2022-04-28 PROCEDURE — 71045 X-RAY EXAM CHEST 1 VIEW: CPT

## 2022-04-28 PROCEDURE — 80076 HEPATIC FUNCTION PANEL: CPT

## 2022-04-28 PROCEDURE — 96376 TX/PRO/DX INJ SAME DRUG ADON: CPT

## 2022-04-28 PROCEDURE — 83735 ASSAY OF MAGNESIUM: CPT

## 2022-04-28 PROCEDURE — 81003 URINALYSIS AUTO W/O SCOPE: CPT

## 2022-04-28 PROCEDURE — 96374 THER/PROPH/DIAG INJ IV PUSH: CPT

## 2022-04-28 PROCEDURE — 84145 PROCALCITONIN (PCT): CPT

## 2022-04-28 RX ORDER — ONDANSETRON 2 MG/ML
4 INJECTION INTRAMUSCULAR; INTRAVENOUS ONCE
Status: COMPLETED | OUTPATIENT
Start: 2022-04-28 | End: 2022-04-28

## 2022-04-28 RX ADMIN — ONDANSETRON 4 MG: 2 INJECTION INTRAMUSCULAR; INTRAVENOUS at 23:31

## 2022-04-28 RX ADMIN — HYDROMORPHONE HYDROCHLORIDE 0.5 MG: 1 INJECTION, SOLUTION INTRAMUSCULAR; INTRAVENOUS; SUBCUTANEOUS at 23:31

## 2022-04-28 ASSESSMENT — PAIN SCALES - GENERAL: PAINLEVEL_OUTOF10: 8

## 2022-04-29 VITALS
DIASTOLIC BLOOD PRESSURE: 77 MMHG | TEMPERATURE: 97.8 F | HEART RATE: 77 BPM | OXYGEN SATURATION: 90 % | SYSTOLIC BLOOD PRESSURE: 143 MMHG | RESPIRATION RATE: 15 BRPM

## 2022-04-29 LAB
BANDED NEUTROPHILS RELATIVE PERCENT: 3 % (ref 0–7)
BASOPHILS ABSOLUTE: 0 K/UL (ref 0–0.2)
BASOPHILS RELATIVE PERCENT: 0 %
EKG ATRIAL RATE: 70 BPM
EKG DIAGNOSIS: NORMAL
EKG P AXIS: 52 DEGREES
EKG P-R INTERVAL: 176 MS
EKG Q-T INTERVAL: 390 MS
EKG QRS DURATION: 92 MS
EKG QTC CALCULATION (BAZETT): 421 MS
EKG R AXIS: -14 DEGREES
EKG T AXIS: 4 DEGREES
EKG VENTRICULAR RATE: 70 BPM
EOSINOPHILS ABSOLUTE: 0 K/UL (ref 0–0.6)
EOSINOPHILS RELATIVE PERCENT: 0 %
HCT VFR BLD CALC: 35.2 % (ref 36–48)
HEMATOLOGY PATH CONSULT: NORMAL
HEMOGLOBIN: 11.4 G/DL (ref 12–16)
LYMPHOCYTES ABSOLUTE: 0 K/UL (ref 1–5.1)
LYMPHOCYTES RELATIVE PERCENT: 0 %
MCH RBC QN AUTO: 32.4 PG (ref 26–34)
MCHC RBC AUTO-ENTMCNC: 32.4 G/DL (ref 31–36)
MCV RBC AUTO: 100.1 FL (ref 80–100)
METAMYELOCYTES RELATIVE PERCENT: 2 %
MONOCYTES ABSOLUTE: 2.9 K/UL (ref 0–1.3)
MONOCYTES RELATIVE PERCENT: 5 %
MYELOCYTE PERCENT: 2 %
NEUTROPHILS ABSOLUTE: 55.1 K/UL (ref 1.7–7.7)
NEUTROPHILS RELATIVE PERCENT: 88 %
PDW BLD-RTO: 16.1 % (ref 12.4–15.4)
PLATELET # BLD: 273 K/UL (ref 135–450)
PMV BLD AUTO: 7.4 FL (ref 5–10.5)
RBC # BLD: 3.51 M/UL (ref 4–5.2)
RBC # BLD: NORMAL 10*6/UL
WBC # BLD: 58 K/UL (ref 4–11)

## 2022-04-29 PROCEDURE — 6360000002 HC RX W HCPCS: Performed by: PHYSICIAN ASSISTANT

## 2022-04-29 PROCEDURE — 93010 ELECTROCARDIOGRAM REPORT: CPT | Performed by: INTERNAL MEDICINE

## 2022-04-29 RX ORDER — ONDANSETRON 2 MG/ML
4 INJECTION INTRAMUSCULAR; INTRAVENOUS ONCE
Status: COMPLETED | OUTPATIENT
Start: 2022-04-29 | End: 2022-04-29

## 2022-04-29 RX ADMIN — HYDROMORPHONE HYDROCHLORIDE 0.5 MG: 1 INJECTION, SOLUTION INTRAMUSCULAR; INTRAVENOUS; SUBCUTANEOUS at 00:57

## 2022-04-29 RX ADMIN — ONDANSETRON 4 MG: 2 INJECTION INTRAMUSCULAR; INTRAVENOUS at 00:57

## 2022-04-29 NOTE — CONSULTS
Called general surgery @ 0003  RE: [facility] 85 general surgery team   Dr. Eric Avalos and Dr. Lia Sánchez called back @ 0009  All images sent to [facility] Mobile care/ air care set to transfer from ED to ED, arriving @ 0130

## 2022-04-29 NOTE — ED PROVIDER NOTES
[address] [facility]  ED  EMERGENCY DEPARTMENT ENCOUNTER        Pt Name: Jocelyn Briscoe  MRN: [id number]  Annagftruong 1940  Date of evaluation: 4/28/2022  Provider: Drena Brittle, PA-C  PCP: Mehrdad Schreiber MD  Note Started: 11:08 PM EDT        I have seen and evaluated this patient with my supervising physician Irwin Samayoa MD.    CHIEF COMPLAINT       Chief Complaint   Patient presents with    Abdominal Pain     pt comes from home for abd , expresses not going to bathroom normally \"bound up\" had bm at 1700 today       HISTORY OF PRESENT ILLNESS   (Location, Timing/Onset, Context/Setting, Quality, Duration, Modifying Factors, Severity, Associated Signs and Symptoms)  Note limiting factors. Chief Complaint: Abdominal pain    Jocelyn Briscoe is a 80 y.o. female who presents with . Patient complaining abdominal pain. Onset 4:30 PM with continuation and progression. Central abdomen, sharp and colicky in character. She has had previous total abdominal hysterectomy. No other abdominal surgeries. She does have nausea and constipation. 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 I do find the patient had infusion yesterday. Followed by [employer]. The patient had identified mass of left lower leg. Treated by surgical oncology with identification of the above diagnosis. I do find in chart review identification beginning December, 2021 with surgical oncology intervention in January, 2022. Patient reports nausea without vomiting. The patient reports constipation without diarrhea. No urinary complaints for no chest pain or shortness of breath. Denies any fevers or chills. Patient did take Gas-X 2 tablets without relief. She does describe a bloated like sensation. She does report slight GERD at times.     Nursing Notes were all reviewed and agreed with or any disagreements were addressed in the HPI. REVIEW OF SYSTEMS    (2-9 systems for level 4, 10 or more for level 5)     Review of Systems    Positives and Pertinent negatives as per HPI. Except as noted above in the ROS, all other systems were reviewed and negative. PAST MEDICAL HISTORY     Past Medical History:   Diagnosis Date    Arthritis     Cancer [facility])     Breast cancer 30 years ago    High blood pressure     History of blood transfusion     PONV (postoperative nausea and vomiting)     after one surgery only    Seasonal allergies     Thyroid disease     polyp         SURGICAL HISTORY     Past Surgical History:   Procedure Laterality Date    BREAST REDUCTION SURGERY      BREAST SURGERY Right     mastectomy with removal of lymph nodes    HYSTERECTOMY      JOINT REPLACEMENT      KNEE SURGERY      MASTECTOMY      THYROID SURGERY      TOTAL KNEE ARTHROPLASTY Left 4/30/2019    LEFT TOTAL KNEE MAKOPLASTY WITH ADDUCTOR CANAL BLOCK FOR PAIN CONTROL    MARTIN VAUGHN performed by Winter Schumacher MD at [address] [place] [address]       Previous Medications    CETIRIZINE (ZYRTEC) 10 MG TABLET    TAKE 1 TABLET BY MOUTH DAILY PRN    FAMOTIDINE (PEPCID) 20 MG TABLET    Take 1 tablet by mouth 2 times daily    FLUTICASONE (FLONASE) 50 MCG/ACT NASAL SPRAY    2 sprays by Nasal route    HYALURONIC ACID-VITAMIN C (HYALURONIC ACID PO)    Take 1 capsule by mouth daily    LORAZEPAM (ATIVAN) 0.5 MG TABLET    Take 0.5 mg by mouth.     MELOXICAM (MOBIC) 15 MG TABLET    TAKE 1 TABLET BY MOUTH EVERY DAY    PROBIOTIC PRODUCT (PROBIOTIC PO)    Take 1 capsule by mouth daily    PROPRANOLOL (INDERAL) 10 MG TABLET    TAKE 1 TABLET BY MOUTH Daily    SERTRALINE (ZOLOFT) 50 MG TABLET    Take 50 mg by mouth daily    TRAZODONE (DESYREL) 100 MG TABLET    Take 100 mg by mouth nightly         ALLERGIES     Iodine, Prochlorperazine edisylate, Shellfish allergy, Shellfish-derived products, Morphine, and Sulfa antibiotics    FAMILYHISTORY       Family History   Problem Relation Age of Onset    Stroke Mother     Heart Disease Father     Stroke Father           SOCIAL HISTORY       Social History     Tobacco Use    Smoking status: Former Smoker    Smokeless tobacco: Never Used   Vaping Use    Vaping Use: Never used   Substance Use Topics    Alcohol use: Yes     Comment: occasionally    Drug use: Never       SCREENINGS             PHYSICAL EXAM    (up to 7 for level 4, 8 or more for level 5)     ED Triage Vitals [04/28/22 2245]   BP Temp Temp Source Pulse Resp SpO2 Height Weight   (!) 164/86 97.8 °F (36.6 °C) Oral 74 14 95 % -- --       Physical Exam  Vitals and nursing note reviewed. Constitutional:       Appearance: Normal appearance. She is well-developed and normal weight. HENT:      Head: Normocephalic and atraumatic. Right Ear: External ear normal.      Left Ear: External ear normal.   Eyes:      General: No scleral icterus. Right eye: No discharge. Left eye: No discharge. Conjunctiva/sclera: Conjunctivae normal.   Cardiovascular:      Rate and Rhythm: Normal rate and regular rhythm. Heart sounds: Normal heart sounds. Pulmonary:      Effort: Pulmonary effort is normal.   Abdominal:      General: Abdomen is flat. Bowel sounds are normal. There is no distension. Palpations: Abdomen is soft. There is no mass. Tenderness: There is abdominal tenderness. There is no guarding or rebound. Comments: -The patient abdomen soft and nondistended. She does complain of a bloated like sensation. Musculoskeletal:         General: Normal range of motion. Cervical back: Normal range of motion and neck supple. Skin:     General: Skin is warm and dry. Neurological:      General: No focal deficit present. Mental Status: She is alert and oriented to person, place, and time. Mental status is at baseline.    Psychiatric:         Mood and Affect: Mood normal. Behavior: Behavior normal.         Thought Content: Thought content normal.         Judgment: Judgment normal.         DIAGNOSTIC RESULTS   LABS:    Labs Reviewed   CBC WITH AUTO DIFFERENTIAL - Abnormal; Notable for the following components:       Result Value    WBC 58.0 (*)     RBC 3.51 (*)     Hemoglobin 11.4 (*)     Hematocrit 35.2 (*)     .1 (*)     RDW 16.1 (*)     Neutrophils Absolute 55.1 (*)     Lymphocytes Absolute 0.0 (*)     Monocytes Absolute 2.9 (*)     Metamyelocytes Relative 2 (*)     Myelocyte Percent 2 (*)     All other components within normal limits    Narrative:     Ozzy Ospina tel. [phone],  Hematology results called to and read back by maryana Larios, 04/29/2022  00:33, by [facility]  Hematology results called to and read back by RN [facility], 04/28/2022  23:22, by [facility]  Hematology results called to and read back by Tricia Beck, 04/28/2022  23:21, by [facility]   BASIC METABOLIC PANEL W/ REFLEX TO MG FOR LOW K - Abnormal; Notable for the following components:    Potassium reflex Magnesium 3.3 (*)     Chloride 96 (*)     Glucose 132 (*)     Calcium 10.9 (*)     All other components within normal limits   LIPASE - Abnormal; Notable for the following components:    Lipase 11.0 (*)     All other components within normal limits   HEPATIC FUNCTION PANEL   TROPONIN   URINALYSIS WITH REFLEX TO CULTURE   PROTIME-INR   PROCALCITONIN   MAGNESIUM   LACTIC ACID       When ordered only abnormal lab results are displayed. All other labs were within normal range or not returned as of this dictation. EKG: When ordered, EKG's are interpreted by the Emergency Department Physician in the absence of a cardiologist.  Please see their note for interpretation of EKG.     RADIOLOGY:   Non-plain film images such as CT, Ultrasound and MRI are read by the radiologist. Plain radiographic images are visualized and preliminarily interpreted by the ED Provider with the below findings:        Interpretation per the Radiologist below, if available at the time of this note:    XR CHEST PORTABLE   Final Result   No acute process. XR ABDOMEN (KUB) (SINGLE AP VIEW)   Final Result   Mild dilatation of multiple loops of small bowel, better characterized on   same day CT. Small gas and moderate stool in the colon. CT ABDOMEN PELVIS WO CONTRAST Additional Contrast? None   Final Result   1. Closed loop small bowel obstruction with transition point at the level of   the distal ileum in the central pelvis. 2. Solid nodule of the right lower lobe measuring 1.5 cm, probably benign as   it was likely present on chest x-ray from 2017. However, prompt follow-up   nonemergent chest CT is recommended. No results found. PROCEDURES   Unless otherwise noted below, none     Procedures    CRITICAL CARE TIME     Critical Care  There was a high probability of life-threatening clinical deterioration in the patient's condition requiring my urgent intervention. Total critical care time with the patient was 50 minutes excluding separately reportable procedures. Critical care required due to patients findings of WBC 58,000 with evidence of a closed-loop small bowel obstruction with transition point at the level of the distal ileum in the central pelvis. Patient undergoing active chemotherapy for diffuse large B-cell lymphoma. Patient requesting transfer. Time spent discussing case with patient, , attending physician, transfer center, general surgery and ED physician at [facility].       CONSULTS:  IP CONSULT TO GENERAL SURGERY      EMERGENCY DEPARTMENT COURSE and DIFFERENTIAL DIAGNOSIS/MDM:   Vitals:    Vitals:    04/28/22 2245 04/29/22 0000   BP: (!) 164/86 (!) 157/88   Pulse: 74 71   Resp: 14 13   Temp: 97.8 °F (36.6 °C)    TempSrc: Oral    SpO2: 95% 94%       Patient was given the following medications:  Medications   HYDROmorphone (DILAUDID) injection 0.5 mg (has no administration in time range)   ondansetron (ZOFRAN) injection 4 mg (has no administration in time range)   ondansetron (ZOFRAN) injection 4 mg (4 mg IntraVENous Given 4/28/22 2331)   HYDROmorphone (DILAUDID) injection 0.5 mg (0.5 mg IntraVENous Given 4/28/22 2331)           Patient undergoing treatment for diffuse large B-cell lymphoma diagnosed lymph nodes left lower extremity. Patient WBC 58. Patient CT scan showing small obstruction with transition zone distal ileum in the central pelvis area. Patient undergoing treatment at [facility]. Patient requesting transfer to [facility]. I spoke with general surge Dr. Kumar Mendoza at 12:10 AM and then spoke with Dr. Светлана Jeffers ED physician at 12:15 AM and the patient has been accepted by both providers. Patient be transferred by ALS as she has received Dilaudid for pain control. She does have IV infusing. Images will be sent by push oral by CD. Nursing staff aware of transfer. Patient agreeable to transfer. She does report Full CODE STATUS. Case discussed with attending physician who personally evaluated the patient. FINAL IMPRESSION      1. Small bowel obstruction (HCC)    2. Leukocytosis, unspecified type    3. Diffuse large B-cell lymphoma of lymph nodes of lower extremity ([facility] [address]          DISPOSITION/PLAN   DISPOSITION Decision To Transfer 04/29/2022 12:19:27 AM      PATIENT REFERRED TO:  No follow-up provider specified. DISCHARGE MEDICATIONS:  New Prescriptions    No medications on file       DISCONTINUED MEDICATIONS:  Discontinued Medications    No medications on file              (Please note that portions of this note were completed with a voice recognition program.  Efforts were made to edit the dictations but occasionally words are mis-transcribed. )    Sarah Lynne PA-C (electronically signed)           Sarah Lynne PA-C  04/29/22 0336

## 2022-04-29 NOTE — ED PROVIDER NOTES
EKG interpretation:  NSR, rate 78, leftward axis, QTC within normal limits, no acute ST or T wave abnormalities compared with prior from April 11, 2019     Anjelica Hammond MD  04/28/22 6867    I independently performed a history and physical on James Martinez. All diagnostic, treatment, and disposition decisions were made by myself in conjunction with the advanced practice provider. Patient currently undergoing chemotherapy for lymphoma presents with abdominal pain was found to have a closed-loop SBO. Case discussed with  where patient is being treated by the oncology department and she has been accepted for transfer. For further details of [facility] [place] emergency department encounter, please see Luis GRIFFIN's documentation.              Anjelica Hammond MD  04/29/22 6790

## 2022-05-10 ENCOUNTER — TELEPHONE (OUTPATIENT)
Dept: CASE MANAGEMENT | Age: 82
End: 2022-05-10

## 2022-05-10 NOTE — TELEPHONE ENCOUNTER
Imaging report CT ABD PELVIS 4/29/2022 with F/U imaging recommendations ROUTED TO PCP [facility] with BERNADINE MEDINA.     Thank you,  Jessika Bernal RN  [facility] Lung Navigator  [phone]

## 2024-08-06 ENCOUNTER — APPOINTMENT (OUTPATIENT)
Dept: CT IMAGING | Age: 84
End: 2024-08-06
Payer: MEDICARE

## 2024-08-06 ENCOUNTER — APPOINTMENT (OUTPATIENT)
Dept: GENERAL RADIOLOGY | Age: 84
End: 2024-08-06
Payer: MEDICARE

## 2024-08-06 ENCOUNTER — HOSPITAL ENCOUNTER (INPATIENT)
Age: 84
LOS: 4 days | Discharge: NURSING, REHAB OR LONG-TERM CARE | End: 2024-08-10
Attending: STUDENT IN AN ORGANIZED HEALTH CARE EDUCATION/TRAINING PROGRAM | Admitting: INTERNAL MEDICINE
Payer: MEDICARE

## 2024-08-06 DIAGNOSIS — S72.002A CLOSED LEFT HIP FRACTURE, INITIAL ENCOUNTER (HCC): Primary | ICD-10-CM

## 2024-08-06 DIAGNOSIS — Z01.810 PREOP CARDIOVASCULAR EXAM: ICD-10-CM

## 2024-08-06 DIAGNOSIS — S72.002A DISPLACED FRACTURE OF LEFT FEMORAL NECK (HCC): ICD-10-CM

## 2024-08-06 LAB
ALBUMIN SERPL-MCNC: 4.5 G/DL (ref 3.4–5)
ALBUMIN/GLOB SERPL: 1.7 {RATIO} (ref 1.1–2.2)
ALP SERPL-CCNC: 94 U/L (ref 40–129)
ALT SERPL-CCNC: 22 U/L (ref 10–40)
ANION GAP SERPL CALCULATED.3IONS-SCNC: 11 MMOL/L (ref 3–16)
AST SERPL-CCNC: 22 U/L (ref 15–37)
BASOPHILS # BLD: 0.2 K/UL (ref 0–0.2)
BASOPHILS NFR BLD: 2 %
BILIRUB SERPL-MCNC: 0.4 MG/DL (ref 0–1)
BUN SERPL-MCNC: 19 MG/DL (ref 7–20)
CALCIUM SERPL-MCNC: 9.4 MG/DL (ref 8.3–10.6)
CHLORIDE SERPL-SCNC: 96 MMOL/L (ref 99–110)
CO2 SERPL-SCNC: 25 MMOL/L (ref 21–32)
CREAT SERPL-MCNC: 0.9 MG/DL (ref 0.6–1.2)
DEPRECATED RDW RBC AUTO: 15.5 % (ref 12.4–15.4)
EOSINOPHIL # BLD: 0.1 K/UL (ref 0–0.6)
EOSINOPHIL NFR BLD: 1 %
GFR SERPLBLD CREATININE-BSD FMLA CKD-EPI: 63 ML/MIN/{1.73_M2}
GLUCOSE SERPL-MCNC: 118 MG/DL (ref 70–99)
HCT VFR BLD AUTO: 38.7 % (ref 36–48)
HGB BLD-MCNC: 13 G/DL (ref 12–16)
LYMPHOCYTES # BLD: 3.1 K/UL (ref 1–5.1)
LYMPHOCYTES NFR BLD: 20 %
MAGNESIUM SERPL-MCNC: 2.2 MG/DL (ref 1.8–2.4)
MCH RBC QN AUTO: 31.5 PG (ref 26–34)
MCHC RBC AUTO-ENTMCNC: 33.5 G/DL (ref 31–36)
MCV RBC AUTO: 94.1 FL (ref 80–100)
MONOCYTES # BLD: 1.5 K/UL (ref 0–1.3)
MONOCYTES NFR BLD: 13 %
NEUTROPHILS # BLD: 6.6 K/UL (ref 1.7–7.7)
NEUTROPHILS NFR BLD: 57 %
PATH INTERP BLD-IMP: YES
PHOSPHATE SERPL-MCNC: 3.9 MG/DL (ref 2.5–4.9)
PLATELET # BLD AUTO: 291 K/UL (ref 135–450)
PLATELET BLD QL SMEAR: ADEQUATE
PMV BLD AUTO: 6.7 FL (ref 5–10.5)
POTASSIUM SERPL-SCNC: 3.4 MMOL/L (ref 3.5–5.1)
PROCALCITONIN SERPL IA-MCNC: 0.04 NG/ML (ref 0–0.15)
PROT SERPL-MCNC: 7.2 G/DL (ref 6.4–8.2)
RBC # BLD AUTO: 4.11 M/UL (ref 4–5.2)
RBC MORPH BLD: NORMAL
SLIDE REVIEW: ABNORMAL
SODIUM SERPL-SCNC: 132 MMOL/L (ref 136–145)
TROPONIN, HIGH SENSITIVITY: 16 NG/L (ref 0–14)
VARIANT LYMPHS NFR BLD MANUAL: 7 % (ref 0–6)
WBC # BLD AUTO: 11.5 K/UL (ref 4–11)

## 2024-08-06 PROCEDURE — 6370000000 HC RX 637 (ALT 250 FOR IP): Performed by: INTERNAL MEDICINE

## 2024-08-06 PROCEDURE — 96375 TX/PRO/DX INJ NEW DRUG ADDON: CPT

## 2024-08-06 PROCEDURE — 84484 ASSAY OF TROPONIN QUANT: CPT

## 2024-08-06 PROCEDURE — 94761 N-INVAS EAR/PLS OXIMETRY MLT: CPT

## 2024-08-06 PROCEDURE — 84100 ASSAY OF PHOSPHORUS: CPT

## 2024-08-06 PROCEDURE — 71045 X-RAY EXAM CHEST 1 VIEW: CPT

## 2024-08-06 PROCEDURE — 70450 CT HEAD/BRAIN W/O DYE: CPT

## 2024-08-06 PROCEDURE — 84145 PROCALCITONIN (PCT): CPT

## 2024-08-06 PROCEDURE — 6360000002 HC RX W HCPCS: Performed by: INTERNAL MEDICINE

## 2024-08-06 PROCEDURE — 1200000000 HC SEMI PRIVATE

## 2024-08-06 PROCEDURE — 2700000000 HC OXYGEN THERAPY PER DAY

## 2024-08-06 PROCEDURE — 96374 THER/PROPH/DIAG INJ IV PUSH: CPT

## 2024-08-06 PROCEDURE — 73502 X-RAY EXAM HIP UNI 2-3 VIEWS: CPT

## 2024-08-06 PROCEDURE — 99285 EMERGENCY DEPT VISIT HI MDM: CPT

## 2024-08-06 PROCEDURE — 6360000002 HC RX W HCPCS: Performed by: NURSE PRACTITIONER

## 2024-08-06 PROCEDURE — 83735 ASSAY OF MAGNESIUM: CPT

## 2024-08-06 PROCEDURE — 93005 ELECTROCARDIOGRAM TRACING: CPT | Performed by: INTERNAL MEDICINE

## 2024-08-06 PROCEDURE — 2580000003 HC RX 258: Performed by: INTERNAL MEDICINE

## 2024-08-06 PROCEDURE — 36415 COLL VENOUS BLD VENIPUNCTURE: CPT

## 2024-08-06 PROCEDURE — 80053 COMPREHEN METABOLIC PANEL: CPT

## 2024-08-06 PROCEDURE — 85025 COMPLETE CBC W/AUTO DIFF WBC: CPT

## 2024-08-06 PROCEDURE — 72125 CT NECK SPINE W/O DYE: CPT

## 2024-08-06 PROCEDURE — 84443 ASSAY THYROID STIM HORMONE: CPT

## 2024-08-06 RX ORDER — ACETAMINOPHEN 325 MG/1
650 TABLET ORAL EVERY 6 HOURS PRN
Status: DISCONTINUED | OUTPATIENT
Start: 2024-08-06 | End: 2024-08-08

## 2024-08-06 RX ORDER — MORPHINE SULFATE 4 MG/ML
4 INJECTION, SOLUTION INTRAMUSCULAR; INTRAVENOUS
Status: COMPLETED | OUTPATIENT
Start: 2024-08-06 | End: 2024-08-07

## 2024-08-06 RX ORDER — FAMOTIDINE 20 MG/1
20 TABLET, FILM COATED ORAL DAILY
Status: DISCONTINUED | OUTPATIENT
Start: 2024-08-06 | End: 2024-08-10 | Stop reason: HOSPADM

## 2024-08-06 RX ORDER — ONDANSETRON 2 MG/ML
4 INJECTION INTRAMUSCULAR; INTRAVENOUS EVERY 6 HOURS PRN
Status: DISCONTINUED | OUTPATIENT
Start: 2024-08-06 | End: 2024-08-10 | Stop reason: HOSPADM

## 2024-08-06 RX ORDER — CETIRIZINE HYDROCHLORIDE 10 MG/1
10 TABLET ORAL DAILY PRN
Status: DISCONTINUED | OUTPATIENT
Start: 2024-08-06 | End: 2024-08-10 | Stop reason: HOSPADM

## 2024-08-06 RX ORDER — NICOTINE 21 MG/24HR
1 PATCH, TRANSDERMAL 24 HOURS TRANSDERMAL DAILY
Status: DISCONTINUED | OUTPATIENT
Start: 2024-08-07 | End: 2024-08-06

## 2024-08-06 RX ORDER — PROMETHAZINE HYDROCHLORIDE 25 MG/1
12.5 TABLET ORAL EVERY 6 HOURS PRN
Status: DISCONTINUED | OUTPATIENT
Start: 2024-08-06 | End: 2024-08-06

## 2024-08-06 RX ORDER — LANOLIN ALCOHOL/MO/W.PET/CERES
3 CREAM (GRAM) TOPICAL NIGHTLY
Status: DISCONTINUED | OUTPATIENT
Start: 2024-08-06 | End: 2024-08-10 | Stop reason: HOSPADM

## 2024-08-06 RX ORDER — PROPRANOLOL HYDROCHLORIDE 10 MG/1
10 TABLET ORAL DAILY
Status: DISCONTINUED | OUTPATIENT
Start: 2024-08-07 | End: 2024-08-10 | Stop reason: HOSPADM

## 2024-08-06 RX ORDER — HYDROMORPHONE HYDROCHLORIDE 1 MG/ML
1 INJECTION, SOLUTION INTRAMUSCULAR; INTRAVENOUS; SUBCUTANEOUS ONCE
Status: COMPLETED | OUTPATIENT
Start: 2024-08-06 | End: 2024-08-06

## 2024-08-06 RX ORDER — ACETAMINOPHEN 650 MG/1
650 SUPPOSITORY RECTAL EVERY 6 HOURS PRN
Status: DISCONTINUED | OUTPATIENT
Start: 2024-08-06 | End: 2024-08-10 | Stop reason: HOSPADM

## 2024-08-06 RX ORDER — SODIUM CHLORIDE 0.9 % (FLUSH) 0.9 %
10 SYRINGE (ML) INJECTION EVERY 12 HOURS SCHEDULED
Status: DISCONTINUED | OUTPATIENT
Start: 2024-08-06 | End: 2024-08-10 | Stop reason: HOSPADM

## 2024-08-06 RX ORDER — SODIUM CHLORIDE 0.9 % (FLUSH) 0.9 %
10 SYRINGE (ML) INJECTION PRN
Status: DISCONTINUED | OUTPATIENT
Start: 2024-08-06 | End: 2024-08-10 | Stop reason: HOSPADM

## 2024-08-06 RX ORDER — ONDANSETRON 2 MG/ML
4 INJECTION INTRAMUSCULAR; INTRAVENOUS ONCE
Status: COMPLETED | OUTPATIENT
Start: 2024-08-06 | End: 2024-08-06

## 2024-08-06 RX ORDER — SODIUM CHLORIDE 9 MG/ML
INJECTION, SOLUTION INTRAVENOUS PRN
Status: DISCONTINUED | OUTPATIENT
Start: 2024-08-06 | End: 2024-08-10 | Stop reason: HOSPADM

## 2024-08-06 RX ORDER — LORAZEPAM 0.5 MG/1
0.5 TABLET ORAL DAILY PRN
Status: DISCONTINUED | OUTPATIENT
Start: 2024-08-06 | End: 2024-08-10 | Stop reason: HOSPADM

## 2024-08-06 RX ORDER — TRAZODONE HYDROCHLORIDE 50 MG/1
100 TABLET ORAL NIGHTLY PRN
Status: DISCONTINUED | OUTPATIENT
Start: 2024-08-06 | End: 2024-08-10 | Stop reason: HOSPADM

## 2024-08-06 RX ADMIN — SODIUM CHLORIDE, PRESERVATIVE FREE 10 ML: 5 INJECTION INTRAVENOUS at 23:30

## 2024-08-06 RX ADMIN — ONDANSETRON 4 MG: 2 INJECTION INTRAMUSCULAR; INTRAVENOUS at 19:20

## 2024-08-06 RX ADMIN — MORPHINE SULFATE 4 MG: 4 INJECTION, SOLUTION INTRAMUSCULAR; INTRAVENOUS at 19:20

## 2024-08-06 RX ADMIN — Medication 3 MG: at 23:30

## 2024-08-06 RX ADMIN — FAMOTIDINE 20 MG: 20 TABLET, FILM COATED ORAL at 21:08

## 2024-08-06 RX ADMIN — HYDROMORPHONE HYDROCHLORIDE 0.5 MG: 1 INJECTION, SOLUTION INTRAMUSCULAR; INTRAVENOUS; SUBCUTANEOUS at 23:30

## 2024-08-06 RX ADMIN — HYDROMORPHONE HYDROCHLORIDE 1 MG: 1 INJECTION, SOLUTION INTRAMUSCULAR; INTRAVENOUS; SUBCUTANEOUS at 20:23

## 2024-08-06 RX ADMIN — POTASSIUM BICARBONATE 40 MEQ: 782 TABLET, EFFERVESCENT ORAL at 21:08

## 2024-08-06 ASSESSMENT — PAIN SCALES - GENERAL
PAINLEVEL_OUTOF10: 8

## 2024-08-06 ASSESSMENT — PAIN DESCRIPTION - LOCATION
LOCATION: HIP

## 2024-08-06 ASSESSMENT — PAIN - FUNCTIONAL ASSESSMENT
PAIN_FUNCTIONAL_ASSESSMENT: 0-10
PAIN_FUNCTIONAL_ASSESSMENT: ACTIVITIES ARE NOT PREVENTED

## 2024-08-06 ASSESSMENT — PAIN DESCRIPTION - DESCRIPTORS
DESCRIPTORS: ACHING
DESCRIPTORS: SHARP
DESCRIPTORS: SHARP

## 2024-08-06 ASSESSMENT — PAIN DESCRIPTION - PAIN TYPE
TYPE: ACUTE PAIN

## 2024-08-06 ASSESSMENT — LIFESTYLE VARIABLES
HOW OFTEN DO YOU HAVE A DRINK CONTAINING ALCOHOL: NEVER
HOW MANY STANDARD DRINKS CONTAINING ALCOHOL DO YOU HAVE ON A TYPICAL DAY: PATIENT DOES NOT DRINK

## 2024-08-06 ASSESSMENT — PAIN DESCRIPTION - ORIENTATION
ORIENTATION: LEFT

## 2024-08-06 ASSESSMENT — PAIN DESCRIPTION - FREQUENCY: FREQUENCY: CONTINUOUS

## 2024-08-06 ASSESSMENT — PAIN DESCRIPTION - ONSET: ONSET: ON-GOING

## 2024-08-06 NOTE — ED NOTES
Patient assisted out of vehicle to wheelchair and then to ER bed, pt unable to stand independently or pivot.

## 2024-08-07 ENCOUNTER — APPOINTMENT (OUTPATIENT)
Dept: GENERAL RADIOLOGY | Age: 84
End: 2024-08-07
Payer: MEDICARE

## 2024-08-07 ENCOUNTER — ANESTHESIA EVENT (OUTPATIENT)
Dept: OPERATING ROOM | Age: 84
End: 2024-08-07
Payer: MEDICARE

## 2024-08-07 ENCOUNTER — ANESTHESIA (OUTPATIENT)
Dept: OPERATING ROOM | Age: 84
End: 2024-08-07
Payer: MEDICARE

## 2024-08-07 ENCOUNTER — APPOINTMENT (OUTPATIENT)
Age: 84
End: 2024-08-07
Attending: INTERNAL MEDICINE
Payer: MEDICARE

## 2024-08-07 ENCOUNTER — APPOINTMENT (OUTPATIENT)
Dept: ULTRASOUND IMAGING | Age: 84
End: 2024-08-07
Payer: MEDICARE

## 2024-08-07 LAB
ALBUMIN SERPL-MCNC: 4.2 G/DL (ref 3.4–5)
ALBUMIN/GLOB SERPL: 1.7 {RATIO} (ref 1.1–2.2)
ALP SERPL-CCNC: 83 U/L (ref 40–129)
ALT SERPL-CCNC: 17 U/L (ref 10–40)
ANION GAP SERPL CALCULATED.3IONS-SCNC: 10 MMOL/L (ref 3–16)
AST SERPL-CCNC: 18 U/L (ref 15–37)
BASOPHILS # BLD: 0 K/UL (ref 0–0.2)
BASOPHILS NFR BLD: 0.5 %
BILIRUB SERPL-MCNC: 0.5 MG/DL (ref 0–1)
BILIRUB UR QL STRIP.AUTO: ABNORMAL
BUN SERPL-MCNC: 21 MG/DL (ref 7–20)
CALCIUM SERPL-MCNC: 9.5 MG/DL (ref 8.3–10.6)
CHLORIDE SERPL-SCNC: 97 MMOL/L (ref 99–110)
CLARITY UR: CLEAR
CO2 SERPL-SCNC: 28 MMOL/L (ref 21–32)
COLOR UR: YELLOW
CREAT SERPL-MCNC: 0.9 MG/DL (ref 0.6–1.2)
DEPRECATED RDW RBC AUTO: 15.8 % (ref 12.4–15.4)
ECHO AO ASC DIAM: 3.6 CM
ECHO AO ASCENDING AORTA INDEX: 2.09 CM/M2
ECHO AO ROOT DIAM: 3.2 CM
ECHO AO ROOT INDEX: 1.86 CM/M2
ECHO AV AREA PEAK VELOCITY: 2.2 CM2
ECHO AV AREA VTI: 2.3 CM2
ECHO AV AREA/BSA PEAK VELOCITY: 1.3 CM2/M2
ECHO AV AREA/BSA VTI: 1.3 CM2/M2
ECHO AV MEAN GRADIENT: 4 MMHG
ECHO AV MEAN VELOCITY: 0.9 M/S
ECHO AV PEAK GRADIENT: 8 MMHG
ECHO AV PEAK VELOCITY: 1.4 M/S
ECHO AV VELOCITY RATIO: 0.71
ECHO AV VTI: 22.9 CM
ECHO BSA: 1.74 M2
ECHO EST RA PRESSURE: 3 MMHG
ECHO LA AREA 2C: 13.2 CM2
ECHO LA AREA 4C: 9 CM2
ECHO LA DIAMETER INDEX: 1.57 CM/M2
ECHO LA DIAMETER: 2.7 CM
ECHO LA MAJOR AXIS: 3.9 CM
ECHO LA MINOR AXIS: 4.7 CM
ECHO LA TO AORTIC ROOT RATIO: 0.84
ECHO LA VOL BP: 25 ML (ref 22–52)
ECHO LA VOL MOD A2C: 30 ML (ref 22–52)
ECHO LA VOL MOD A4C: 17 ML (ref 22–52)
ECHO LA VOL/BSA BIPLANE: 15 ML/M2 (ref 16–34)
ECHO LA VOLUME INDEX MOD A2C: 17 ML/M2 (ref 16–34)
ECHO LA VOLUME INDEX MOD A4C: 10 ML/M2 (ref 16–34)
ECHO LV E' LATERAL VELOCITY: 9 CM/S
ECHO LV E' SEPTAL VELOCITY: 6 CM/S
ECHO LV EDV 3D: 74 ML
ECHO LV EDV INDEX 3D: 43 ML/M2
ECHO LV EJECTION FRACTION 3D: 59 %
ECHO LV ESV 3D: 31 ML
ECHO LV ESV INDEX 3D: 18 ML/M2
ECHO LV FRACTIONAL SHORTENING: 30 % (ref 28–44)
ECHO LV GLOBAL LONGITUDINAL STRAIN (GLS): -14.5 %
ECHO LV INTERNAL DIMENSION DIASTOLE INDEX: 2.73 CM/M2
ECHO LV INTERNAL DIMENSION DIASTOLIC: 4.7 CM (ref 3.9–5.3)
ECHO LV INTERNAL DIMENSION SYSTOLIC INDEX: 1.92 CM/M2
ECHO LV INTERNAL DIMENSION SYSTOLIC: 3.3 CM
ECHO LV IVSD: 1 CM (ref 0.6–0.9)
ECHO LV MASS 2D: 164.5 G (ref 67–162)
ECHO LV MASS 3D INDEX: 64 G/M2
ECHO LV MASS 3D: 110 G
ECHO LV MASS INDEX 2D: 95.6 G/M2 (ref 43–95)
ECHO LV POSTERIOR WALL DIASTOLIC: 1 CM (ref 0.6–0.9)
ECHO LV RELATIVE WALL THICKNESS RATIO: 0.43
ECHO LVOT AREA: 2.8 CM2
ECHO LVOT AV VTI INDEX: 0.82
ECHO LVOT DIAM: 1.9 CM
ECHO LVOT MEAN GRADIENT: 2 MMHG
ECHO LVOT PEAK GRADIENT: 4 MMHG
ECHO LVOT PEAK VELOCITY: 1 M/S
ECHO LVOT STROKE VOLUME INDEX: 30.8 ML/M2
ECHO LVOT SV: 53 ML
ECHO LVOT VTI: 18.7 CM
ECHO MV A VELOCITY: 0.99 M/S
ECHO MV E DECELERATION TIME (DT): 209 MS
ECHO MV E VELOCITY: 0.41 M/S
ECHO MV E/A RATIO: 0.41
ECHO MV E/E' LATERAL: 4.56
ECHO MV E/E' RATIO (AVERAGED): 5.69
ECHO MV E/E' SEPTAL: 6.83
ECHO RA AREA 4C: 8.8 CM2
ECHO RA END SYSTOLIC VOLUME APICAL 4 CHAMBER INDEX BSA: 9 ML/M2
ECHO RA VOLUME: 16 ML
ECHO RV FREE WALL PEAK S': 13 CM/S
ECHO RV TAPSE: 2.7 CM (ref 1.7–?)
EOSINOPHIL # BLD: 0 K/UL (ref 0–0.6)
EOSINOPHIL NFR BLD: 0.3 %
GFR SERPLBLD CREATININE-BSD FMLA CKD-EPI: 63 ML/MIN/{1.73_M2}
GLUCOSE SERPL-MCNC: 145 MG/DL (ref 70–99)
GLUCOSE UR STRIP.AUTO-MCNC: NEGATIVE MG/DL
HCT VFR BLD AUTO: 36.9 % (ref 36–48)
HGB BLD-MCNC: 12.2 G/DL (ref 12–16)
HGB UR QL STRIP.AUTO: NEGATIVE
KETONES UR STRIP.AUTO-MCNC: NEGATIVE MG/DL
LEUKOCYTE ESTERASE UR QL STRIP.AUTO: NEGATIVE
LYMPHOCYTES # BLD: 1.3 K/UL (ref 1–5.1)
LYMPHOCYTES NFR BLD: 13 %
MCH RBC QN AUTO: 31.4 PG (ref 26–34)
MCHC RBC AUTO-ENTMCNC: 33.1 G/DL (ref 31–36)
MCV RBC AUTO: 94.6 FL (ref 80–100)
MONOCYTES # BLD: 1.9 K/UL (ref 0–1.3)
MONOCYTES NFR BLD: 18.5 %
NEUTROPHILS # BLD: 7 K/UL (ref 1.7–7.7)
NEUTROPHILS NFR BLD: 67.7 %
NITRITE UR QL STRIP.AUTO: NEGATIVE
OSMOLALITY UR: 625 MOSM/KG (ref 390–1070)
PATH INTERP BLD-IMP: NO
PATH INTERP BLD-IMP: NORMAL
PH UR STRIP.AUTO: 6 [PH] (ref 5–8)
PLATELET # BLD AUTO: 243 K/UL (ref 135–450)
PMV BLD AUTO: 6.6 FL (ref 5–10.5)
POTASSIUM SERPL-SCNC: 4.7 MMOL/L (ref 3.5–5.1)
PROT SERPL-MCNC: 6.7 G/DL (ref 6.4–8.2)
PROT UR STRIP.AUTO-MCNC: NEGATIVE MG/DL
RBC # BLD AUTO: 3.9 M/UL (ref 4–5.2)
SODIUM SERPL-SCNC: 135 MMOL/L (ref 136–145)
SODIUM UR-SCNC: <20 MMOL/L
SP GR UR STRIP.AUTO: >=1.03 (ref 1–1.03)
TROPONIN, HIGH SENSITIVITY: 14 NG/L (ref 0–14)
TSH SERPL DL<=0.005 MIU/L-ACNC: 3.39 UIU/ML (ref 0.27–4.2)
TSH SERPL DL<=0.005 MIU/L-ACNC: 3.81 UIU/ML (ref 0.27–4.2)
UA COMPLETE W REFLEX CULTURE PNL UR: ABNORMAL
UA DIPSTICK W REFLEX MICRO PNL UR: ABNORMAL
URN SPEC COLLECT METH UR: ABNORMAL
UROBILINOGEN UR STRIP-ACNC: 0.2 E.U./DL
WBC # BLD AUTO: 10.3 K/UL (ref 4–11)

## 2024-08-07 PROCEDURE — 84484 ASSAY OF TROPONIN QUANT: CPT

## 2024-08-07 PROCEDURE — 6360000002 HC RX W HCPCS: Performed by: STUDENT IN AN ORGANIZED HEALTH CARE EDUCATION/TRAINING PROGRAM

## 2024-08-07 PROCEDURE — 27236 TREAT THIGH FRACTURE: CPT | Performed by: STUDENT IN AN ORGANIZED HEALTH CARE EDUCATION/TRAINING PROGRAM

## 2024-08-07 PROCEDURE — 84300 ASSAY OF URINE SODIUM: CPT

## 2024-08-07 PROCEDURE — 64447 NJX AA&/STRD FEMORAL NRV IMG: CPT | Performed by: ANESTHESIOLOGY

## 2024-08-07 PROCEDURE — 3700000001 HC ADD 15 MINUTES (ANESTHESIA): Performed by: STUDENT IN AN ORGANIZED HEALTH CARE EDUCATION/TRAINING PROGRAM

## 2024-08-07 PROCEDURE — 7100000001 HC PACU RECOVERY - ADDTL 15 MIN: Performed by: STUDENT IN AN ORGANIZED HEALTH CARE EDUCATION/TRAINING PROGRAM

## 2024-08-07 PROCEDURE — 2580000003 HC RX 258: Performed by: STUDENT IN AN ORGANIZED HEALTH CARE EDUCATION/TRAINING PROGRAM

## 2024-08-07 PROCEDURE — 80053 COMPREHEN METABOLIC PANEL: CPT

## 2024-08-07 PROCEDURE — 1200000000 HC SEMI PRIVATE

## 2024-08-07 PROCEDURE — 83935 ASSAY OF URINE OSMOLALITY: CPT

## 2024-08-07 PROCEDURE — 6360000002 HC RX W HCPCS: Performed by: NURSE PRACTITIONER

## 2024-08-07 PROCEDURE — 73502 X-RAY EXAM HIP UNI 2-3 VIEWS: CPT

## 2024-08-07 PROCEDURE — 2500000003 HC RX 250 WO HCPCS: Performed by: ANESTHESIOLOGY

## 2024-08-07 PROCEDURE — 2500000003 HC RX 250 WO HCPCS: Performed by: NURSE ANESTHETIST, CERTIFIED REGISTERED

## 2024-08-07 PROCEDURE — 6360000002 HC RX W HCPCS: Performed by: SPECIALIST/TECHNOLOGIST

## 2024-08-07 PROCEDURE — 6360000002 HC RX W HCPCS: Performed by: NURSE ANESTHETIST, CERTIFIED REGISTERED

## 2024-08-07 PROCEDURE — 85025 COMPLETE CBC W/AUTO DIFF WBC: CPT

## 2024-08-07 PROCEDURE — 3600000004 HC SURGERY LEVEL 4 BASE: Performed by: STUDENT IN AN ORGANIZED HEALTH CARE EDUCATION/TRAINING PROGRAM

## 2024-08-07 PROCEDURE — C1776 JOINT DEVICE (IMPLANTABLE): HCPCS | Performed by: STUDENT IN AN ORGANIZED HEALTH CARE EDUCATION/TRAINING PROGRAM

## 2024-08-07 PROCEDURE — 2709999900 HC NON-CHARGEABLE SUPPLY: Performed by: STUDENT IN AN ORGANIZED HEALTH CARE EDUCATION/TRAINING PROGRAM

## 2024-08-07 PROCEDURE — 99222 1ST HOSP IP/OBS MODERATE 55: CPT | Performed by: INTERNAL MEDICINE

## 2024-08-07 PROCEDURE — 7100000000 HC PACU RECOVERY - FIRST 15 MIN: Performed by: STUDENT IN AN ORGANIZED HEALTH CARE EDUCATION/TRAINING PROGRAM

## 2024-08-07 PROCEDURE — 6370000000 HC RX 637 (ALT 250 FOR IP): Performed by: STUDENT IN AN ORGANIZED HEALTH CARE EDUCATION/TRAINING PROGRAM

## 2024-08-07 PROCEDURE — 6360000002 HC RX W HCPCS: Performed by: INTERNAL MEDICINE

## 2024-08-07 PROCEDURE — 76536 US EXAM OF HEAD AND NECK: CPT

## 2024-08-07 PROCEDURE — 81003 URINALYSIS AUTO W/O SCOPE: CPT

## 2024-08-07 PROCEDURE — 2700000000 HC OXYGEN THERAPY PER DAY

## 2024-08-07 PROCEDURE — 2580000003 HC RX 258: Performed by: INTERNAL MEDICINE

## 2024-08-07 PROCEDURE — 2580000003 HC RX 258: Performed by: NURSE ANESTHETIST, CERTIFIED REGISTERED

## 2024-08-07 PROCEDURE — 2720000010 HC SURG SUPPLY STERILE: Performed by: STUDENT IN AN ORGANIZED HEALTH CARE EDUCATION/TRAINING PROGRAM

## 2024-08-07 PROCEDURE — 3600000014 HC SURGERY LEVEL 4 ADDTL 15MIN: Performed by: STUDENT IN AN ORGANIZED HEALTH CARE EDUCATION/TRAINING PROGRAM

## 2024-08-07 PROCEDURE — 99223 1ST HOSP IP/OBS HIGH 75: CPT | Performed by: STUDENT IN AN ORGANIZED HEALTH CARE EDUCATION/TRAINING PROGRAM

## 2024-08-07 PROCEDURE — A4217 STERILE WATER/SALINE, 500 ML: HCPCS | Performed by: STUDENT IN AN ORGANIZED HEALTH CARE EDUCATION/TRAINING PROGRAM

## 2024-08-07 PROCEDURE — 94761 N-INVAS EAR/PLS OXIMETRY MLT: CPT

## 2024-08-07 PROCEDURE — 93306 TTE W/DOPPLER COMPLETE: CPT

## 2024-08-07 PROCEDURE — 6370000000 HC RX 637 (ALT 250 FOR IP): Performed by: ANESTHESIOLOGY

## 2024-08-07 PROCEDURE — 3700000000 HC ANESTHESIA ATTENDED CARE: Performed by: STUDENT IN AN ORGANIZED HEALTH CARE EDUCATION/TRAINING PROGRAM

## 2024-08-07 PROCEDURE — 93306 TTE W/DOPPLER COMPLETE: CPT | Performed by: INTERNAL MEDICINE

## 2024-08-07 PROCEDURE — 84443 ASSAY THYROID STIM HORMONE: CPT

## 2024-08-07 PROCEDURE — 76376 3D RENDER W/INTRP POSTPROCES: CPT | Performed by: INTERNAL MEDICINE

## 2024-08-07 PROCEDURE — 6370000000 HC RX 637 (ALT 250 FOR IP): Performed by: NURSE PRACTITIONER

## 2024-08-07 PROCEDURE — 27236 TREAT THIGH FRACTURE: CPT | Performed by: PHYSICIAN ASSISTANT

## 2024-08-07 PROCEDURE — 0SRS0JZ REPLACEMENT OF LEFT HIP JOINT, FEMORAL SURFACE WITH SYNTHETIC SUBSTITUTE, OPEN APPROACH: ICD-10-PCS | Performed by: STUDENT IN AN ORGANIZED HEALTH CARE EDUCATION/TRAINING PROGRAM

## 2024-08-07 DEVICE — IMPLANTABLE DEVICE
Type: IMPLANTABLE DEVICE | Status: FUNCTIONAL
Brand: RINGLOC BI-POLAR HIP SYSTEM

## 2024-08-07 DEVICE — IMPLANTABLE DEVICE: Type: IMPLANTABLE DEVICE | Site: HIP | Status: FUNCTIONAL

## 2024-08-07 DEVICE — IMPLANTABLE DEVICE
Type: IMPLANTABLE DEVICE | Site: HIP | Status: FUNCTIONAL
Brand: AVENIR COMPLETE™

## 2024-08-07 RX ORDER — ONDANSETRON 2 MG/ML
4 INJECTION INTRAMUSCULAR; INTRAVENOUS EVERY 30 MIN PRN
Status: DISCONTINUED | OUTPATIENT
Start: 2024-08-07 | End: 2024-08-07 | Stop reason: HOSPADM

## 2024-08-07 RX ORDER — SODIUM CHLORIDE, SODIUM LACTATE, POTASSIUM CHLORIDE, CALCIUM CHLORIDE 600; 310; 30; 20 MG/100ML; MG/100ML; MG/100ML; MG/100ML
INJECTION, SOLUTION INTRAVENOUS CONTINUOUS PRN
Status: DISCONTINUED | OUTPATIENT
Start: 2024-08-07 | End: 2024-08-07 | Stop reason: SDUPTHER

## 2024-08-07 RX ORDER — HYDROMORPHONE HYDROCHLORIDE 2 MG/ML
INJECTION, SOLUTION INTRAMUSCULAR; INTRAVENOUS; SUBCUTANEOUS PRN
Status: DISCONTINUED | OUTPATIENT
Start: 2024-08-07 | End: 2024-08-07 | Stop reason: SDUPTHER

## 2024-08-07 RX ORDER — FENTANYL CITRATE 50 UG/ML
INJECTION, SOLUTION INTRAMUSCULAR; INTRAVENOUS PRN
Status: DISCONTINUED | OUTPATIENT
Start: 2024-08-07 | End: 2024-08-07 | Stop reason: SDUPTHER

## 2024-08-07 RX ORDER — BUPIVACAINE HYDROCHLORIDE AND EPINEPHRINE 2.5; 5 MG/ML; UG/ML
INJECTION, SOLUTION EPIDURAL; INFILTRATION; INTRACAUDAL; PERINEURAL
Status: COMPLETED | OUTPATIENT
Start: 2024-08-07 | End: 2024-08-07

## 2024-08-07 RX ORDER — CEFAZOLIN SODIUM IN 0.9 % NACL 2 G/100 ML
2000 PLASTIC BAG, INJECTION (ML) INTRAVENOUS EVERY 8 HOURS
Status: COMPLETED | OUTPATIENT
Start: 2024-08-08 | End: 2024-08-08

## 2024-08-07 RX ORDER — TRANEXAMIC ACID 100 MG/ML
INJECTION, SOLUTION INTRAVENOUS PRN
Status: DISCONTINUED | OUTPATIENT
Start: 2024-08-07 | End: 2024-08-07 | Stop reason: SDUPTHER

## 2024-08-07 RX ORDER — OXYCODONE HYDROCHLORIDE 5 MG/1
10 TABLET ORAL PRN
Status: DISCONTINUED | OUTPATIENT
Start: 2024-08-07 | End: 2024-08-07 | Stop reason: HOSPADM

## 2024-08-07 RX ORDER — SODIUM CHLORIDE 9 MG/ML
INJECTION, SOLUTION INTRAVENOUS PRN
Status: DISCONTINUED | OUTPATIENT
Start: 2024-08-07 | End: 2024-08-07 | Stop reason: HOSPADM

## 2024-08-07 RX ORDER — OXYCODONE HYDROCHLORIDE 5 MG/1
5 TABLET ORAL PRN
Status: DISCONTINUED | OUTPATIENT
Start: 2024-08-07 | End: 2024-08-07 | Stop reason: HOSPADM

## 2024-08-07 RX ORDER — ENOXAPARIN SODIUM 100 MG/ML
40 INJECTION SUBCUTANEOUS DAILY
Status: DISCONTINUED | OUTPATIENT
Start: 2024-08-08 | End: 2024-08-10 | Stop reason: HOSPADM

## 2024-08-07 RX ORDER — OXYCODONE HYDROCHLORIDE 5 MG/1
5 TABLET ORAL EVERY 4 HOURS PRN
Status: DISCONTINUED | OUTPATIENT
Start: 2024-08-07 | End: 2024-08-08

## 2024-08-07 RX ORDER — LIDOCAINE HYDROCHLORIDE 20 MG/ML
INJECTION, SOLUTION INFILTRATION; PERINEURAL PRN
Status: DISCONTINUED | OUTPATIENT
Start: 2024-08-07 | End: 2024-08-07 | Stop reason: SDUPTHER

## 2024-08-07 RX ORDER — DEXAMETHASONE SODIUM PHOSPHATE 4 MG/ML
INJECTION, SOLUTION INTRA-ARTICULAR; INTRALESIONAL; INTRAMUSCULAR; INTRAVENOUS; SOFT TISSUE PRN
Status: DISCONTINUED | OUTPATIENT
Start: 2024-08-07 | End: 2024-08-07 | Stop reason: SDUPTHER

## 2024-08-07 RX ORDER — SODIUM CHLORIDE 0.9 % (FLUSH) 0.9 %
5-40 SYRINGE (ML) INJECTION EVERY 12 HOURS SCHEDULED
Status: DISCONTINUED | OUTPATIENT
Start: 2024-08-07 | End: 2024-08-07 | Stop reason: HOSPADM

## 2024-08-07 RX ORDER — EPHEDRINE SULFATE 50 MG/ML
INJECTION INTRAVENOUS PRN
Status: DISCONTINUED | OUTPATIENT
Start: 2024-08-07 | End: 2024-08-07 | Stop reason: SDUPTHER

## 2024-08-07 RX ORDER — MAGNESIUM HYDROXIDE 1200 MG/15ML
LIQUID ORAL CONTINUOUS PRN
Status: COMPLETED | OUTPATIENT
Start: 2024-08-07 | End: 2024-08-07

## 2024-08-07 RX ORDER — PHENYLEPHRINE HCL IN 0.9% NACL 1 MG/10 ML
SYRINGE (ML) INTRAVENOUS PRN
Status: DISCONTINUED | OUTPATIENT
Start: 2024-08-07 | End: 2024-08-07 | Stop reason: SDUPTHER

## 2024-08-07 RX ORDER — PROPOFOL 10 MG/ML
INJECTION, EMULSION INTRAVENOUS PRN
Status: DISCONTINUED | OUTPATIENT
Start: 2024-08-07 | End: 2024-08-07 | Stop reason: SDUPTHER

## 2024-08-07 RX ORDER — ROCURONIUM BROMIDE 10 MG/ML
INJECTION, SOLUTION INTRAVENOUS PRN
Status: DISCONTINUED | OUTPATIENT
Start: 2024-08-07 | End: 2024-08-07 | Stop reason: SDUPTHER

## 2024-08-07 RX ORDER — LABETALOL HYDROCHLORIDE 5 MG/ML
10 INJECTION, SOLUTION INTRAVENOUS
Status: DISCONTINUED | OUTPATIENT
Start: 2024-08-07 | End: 2024-08-07 | Stop reason: HOSPADM

## 2024-08-07 RX ORDER — VANCOMYCIN HYDROCHLORIDE 1 G/20ML
INJECTION, POWDER, LYOPHILIZED, FOR SOLUTION INTRAVENOUS PRN
Status: DISCONTINUED | OUTPATIENT
Start: 2024-08-07 | End: 2024-08-07 | Stop reason: ALTCHOICE

## 2024-08-07 RX ORDER — CEFAZOLIN SODIUM IN 0.9 % NACL 2 G/100 ML
2000 PLASTIC BAG, INJECTION (ML) INTRAVENOUS
Status: COMPLETED | OUTPATIENT
Start: 2024-08-07 | End: 2024-08-07

## 2024-08-07 RX ORDER — ONDANSETRON 2 MG/ML
INJECTION INTRAMUSCULAR; INTRAVENOUS PRN
Status: DISCONTINUED | OUTPATIENT
Start: 2024-08-07 | End: 2024-08-07 | Stop reason: SDUPTHER

## 2024-08-07 RX ORDER — NALOXONE HYDROCHLORIDE 0.4 MG/ML
INJECTION, SOLUTION INTRAMUSCULAR; INTRAVENOUS; SUBCUTANEOUS PRN
Status: DISCONTINUED | OUTPATIENT
Start: 2024-08-07 | End: 2024-08-07 | Stop reason: HOSPADM

## 2024-08-07 RX ORDER — DIPHENHYDRAMINE HCL 25 MG
25 TABLET ORAL EVERY 6 HOURS PRN
Status: DISCONTINUED | OUTPATIENT
Start: 2024-08-07 | End: 2024-08-10 | Stop reason: HOSPADM

## 2024-08-07 RX ORDER — SODIUM CHLORIDE 0.9 % (FLUSH) 0.9 %
5-40 SYRINGE (ML) INJECTION PRN
Status: DISCONTINUED | OUTPATIENT
Start: 2024-08-07 | End: 2024-08-07 | Stop reason: HOSPADM

## 2024-08-07 RX ADMIN — FENTANYL CITRATE 100 MCG: 50 INJECTION, SOLUTION INTRAMUSCULAR; INTRAVENOUS at 18:09

## 2024-08-07 RX ADMIN — SODIUM CHLORIDE, SODIUM LACTATE, POTASSIUM CHLORIDE, AND CALCIUM CHLORIDE: .6; .31; .03; .02 INJECTION, SOLUTION INTRAVENOUS at 18:04

## 2024-08-07 RX ADMIN — MORPHINE SULFATE 4 MG: 4 INJECTION, SOLUTION INTRAMUSCULAR; INTRAVENOUS at 07:25

## 2024-08-07 RX ADMIN — ROCURONIUM BROMIDE 50 MG: 50 INJECTION, SOLUTION INTRAVENOUS at 18:09

## 2024-08-07 RX ADMIN — TRANEXAMIC ACID 1028 MG: 100 INJECTION, SOLUTION INTRAVENOUS at 18:22

## 2024-08-07 RX ADMIN — Medication 2 AMPULE: at 16:11

## 2024-08-07 RX ADMIN — OXYCODONE HYDROCHLORIDE 5 MG: 5 TABLET ORAL at 20:22

## 2024-08-07 RX ADMIN — ONDANSETRON 4 MG: 2 INJECTION INTRAMUSCULAR; INTRAVENOUS at 18:09

## 2024-08-07 RX ADMIN — ONDANSETRON 4 MG: 2 INJECTION INTRAMUSCULAR; INTRAVENOUS at 23:22

## 2024-08-07 RX ADMIN — DIPHENHYDRAMINE HYDROCHLORIDE 25 MG: 25 TABLET ORAL at 22:19

## 2024-08-07 RX ADMIN — EPHEDRINE SULFATE 10 MG: 50 INJECTION INTRAVENOUS at 19:05

## 2024-08-07 RX ADMIN — HYDROMORPHONE HYDROCHLORIDE 0.5 MG: 1 INJECTION, SOLUTION INTRAMUSCULAR; INTRAVENOUS; SUBCUTANEOUS at 14:52

## 2024-08-07 RX ADMIN — EPHEDRINE SULFATE 5 MG: 50 INJECTION INTRAVENOUS at 18:57

## 2024-08-07 RX ADMIN — HYDROMORPHONE HYDROCHLORIDE 0.5 MG: 1 INJECTION, SOLUTION INTRAMUSCULAR; INTRAVENOUS; SUBCUTANEOUS at 05:52

## 2024-08-07 RX ADMIN — HYDROMORPHONE HYDROCHLORIDE 0.5 MG: 1 INJECTION, SOLUTION INTRAMUSCULAR; INTRAVENOUS; SUBCUTANEOUS at 10:32

## 2024-08-07 RX ADMIN — LIDOCAINE HYDROCHLORIDE 40 MG: 20 INJECTION, SOLUTION INFILTRATION; PERINEURAL at 18:09

## 2024-08-07 RX ADMIN — Medication 100 MCG: at 19:06

## 2024-08-07 RX ADMIN — BUPIVACAINE HYDROCHLORIDE AND EPINEPHRINE BITARTRATE 20 ML: 2.5; .005 INJECTION, SOLUTION EPIDURAL; INFILTRATION; INTRACAUDAL; PERINEURAL at 18:00

## 2024-08-07 RX ADMIN — Medication 100 MCG: at 19:13

## 2024-08-07 RX ADMIN — DEXAMETHASONE SODIUM PHOSPHATE 4 MG: 4 INJECTION, SOLUTION INTRAMUSCULAR; INTRAVENOUS at 18:09

## 2024-08-07 RX ADMIN — SODIUM CHLORIDE, PRESERVATIVE FREE 10 ML: 5 INJECTION INTRAVENOUS at 09:12

## 2024-08-07 RX ADMIN — HYDROMORPHONE HYDROCHLORIDE 0.5 MG: 1 INJECTION, SOLUTION INTRAMUSCULAR; INTRAVENOUS; SUBCUTANEOUS at 02:44

## 2024-08-07 RX ADMIN — Medication 100 MCG: at 19:10

## 2024-08-07 RX ADMIN — SUGAMMADEX 200 MG: 100 INJECTION, SOLUTION INTRAVENOUS at 19:35

## 2024-08-07 RX ADMIN — EPHEDRINE SULFATE 10 MG: 50 INJECTION INTRAVENOUS at 19:11

## 2024-08-07 RX ADMIN — SODIUM CHLORIDE, PRESERVATIVE FREE 10 ML: 5 INJECTION INTRAVENOUS at 23:23

## 2024-08-07 RX ADMIN — Medication 3 MG: at 22:19

## 2024-08-07 RX ADMIN — HYDROMORPHONE HYDROCHLORIDE 1 MG: 2 INJECTION, SOLUTION INTRAMUSCULAR; INTRAVENOUS; SUBCUTANEOUS at 19:43

## 2024-08-07 RX ADMIN — PROPOFOL 100 MG: 10 INJECTION, EMULSION INTRAVENOUS at 18:09

## 2024-08-07 RX ADMIN — Medication 2000 MG: at 18:09

## 2024-08-07 RX ADMIN — Medication 100 MCG: at 19:02

## 2024-08-07 ASSESSMENT — PAIN DESCRIPTION - ONSET
ONSET: ON-GOING
ONSET: GRADUAL

## 2024-08-07 ASSESSMENT — PAIN SCALES - GENERAL
PAINLEVEL_OUTOF10: 9
PAINLEVEL_OUTOF10: 10
PAINLEVEL_OUTOF10: 3
PAINLEVEL_OUTOF10: 5
PAINLEVEL_OUTOF10: 4
PAINLEVEL_OUTOF10: 8
PAINLEVEL_OUTOF10: 4
PAINLEVEL_OUTOF10: 8

## 2024-08-07 ASSESSMENT — PAIN DESCRIPTION - LOCATION
LOCATION: LEG;HIP
LOCATION: HIP
LOCATION: HIP;LEG

## 2024-08-07 ASSESSMENT — PAIN DESCRIPTION - ORIENTATION
ORIENTATION: LEFT

## 2024-08-07 ASSESSMENT — PAIN DESCRIPTION - DESCRIPTORS
DESCRIPTORS: SHARP;SHOOTING
DESCRIPTORS: ACHING;SHARP
DESCRIPTORS: ACHING
DESCRIPTORS: ACHING
DESCRIPTORS: SHARP;SHOOTING
DESCRIPTORS: THROBBING
DESCRIPTORS: SHARP
DESCRIPTORS: ACHING
DESCRIPTORS: ACHING;THROBBING

## 2024-08-07 ASSESSMENT — PAIN DESCRIPTION - FREQUENCY
FREQUENCY: CONTINUOUS

## 2024-08-07 ASSESSMENT — PAIN - FUNCTIONAL ASSESSMENT
PAIN_FUNCTIONAL_ASSESSMENT: ACTIVITIES ARE NOT PREVENTED
PAIN_FUNCTIONAL_ASSESSMENT: PREVENTS OR INTERFERES SOME ACTIVE ACTIVITIES AND ADLS
PAIN_FUNCTIONAL_ASSESSMENT: PREVENTS OR INTERFERES WITH ALL ACTIVE AND SOME PASSIVE ACTIVITIES
PAIN_FUNCTIONAL_ASSESSMENT: ACTIVITIES ARE NOT PREVENTED
PAIN_FUNCTIONAL_ASSESSMENT: 0-10
PAIN_FUNCTIONAL_ASSESSMENT: ACTIVITIES ARE NOT PREVENTED
PAIN_FUNCTIONAL_ASSESSMENT: PREVENTS OR INTERFERES WITH ALL ACTIVE AND SOME PASSIVE ACTIVITIES
PAIN_FUNCTIONAL_ASSESSMENT: NONE - DENIES PAIN
PAIN_FUNCTIONAL_ASSESSMENT: PREVENTS OR INTERFERES SOME ACTIVE ACTIVITIES AND ADLS

## 2024-08-07 ASSESSMENT — PAIN DESCRIPTION - PAIN TYPE
TYPE: ACUTE PAIN
TYPE: SURGICAL PAIN
TYPE: SURGICAL PAIN
TYPE: ACUTE PAIN

## 2024-08-07 NOTE — ANESTHESIA PROCEDURE NOTES
Peripheral Block    Patient location during procedure: OR  Reason for block: post-op pain management and at surgeon's request  Start time: 8/7/2024 6:00 PM  End time: 8/7/2024 6:10 PM  Staffing  Performed: anesthesiologist   Anesthesiologist: Shawn Saunders MD  Performed by: Shawn Saunders MD  Authorized by: Shawn Saunders MD    Preanesthetic Checklist  Completed: patient identified, IV checked, site marked, risks and benefits discussed, surgical/procedural consents, equipment checked, anesthesia consent given and monitors applied/VS acknowledged  Peripheral Block   Patient position: supine  Prep: ChloraPrep  Provider prep: sterile gloves  Patient monitoring: cardiac monitor, continuous pulse ox, continuous capnometry, frequent blood pressure checks, IV access and oxygen  Block type: PENG  Laterality: left  Injection technique: single-shot  Guidance: ultrasound guided    Needle   Needle type: short-bevel   Needle gauge: 20 G  Needle localization: ultrasound guidance  Needle length: 10 cm  Assessment   Injection assessment: negative aspiration for heme, local visualized surrounding nerve on ultrasound and no intravascular symptoms  Paresthesia pain: none  Slow fractionated injection: yes  Hemodynamics: stable  Outcomes: patient tolerated procedure well    Medications Administered  BUPivacaine 0.25%-EPINEPHrine injection 1:200000 - Perineural   20 mL - 8/7/2024 6:00:00 PM

## 2024-08-07 NOTE — ED NOTES
Patient Name: Misty Dumont  :  1940  83 y.o.  MRN:  [id number]  Preferred Name    ED Room #:    Family/Caregiver Present yes  Restraints no  Sitter no  Sepsis Risk Score      Situation  Code Status: Prior .    Allergies: Iodine, Prochlorperazine edisylate, Shellfish allergy, Shellfish-derived products, Morphine, and Sulfa antibiotics  Weight: Patient Vitals for the past 96 hrs (Last 3 readings):   Weight   24 1909 68 kg (150 lb)     Arrived from: home  Chief Complaint:   Chief Complaint   Patient presents with    Hip Pain     Pt fell today at [employer] on left hip. States she moved to fast and and the cart moved causing her to lose balance. Pt unable to move leg. Reports hitting head, but no LOC. Left leg appears to be rotated, but unable to determine length due to pt not being able to straighten leg.     Hospital Problem/Diagnosis:  Principal Problem:    Displaced fracture of left femoral neck (HCC)  Active Problems:    Closed left hip fracture, initial encounter ([facility])  Resolved Problems:    * No resolved hospital problems. *    Imaging:   CT HEAD WO CONTRAST   Final Result   No acute intracranial abnormality.         CT CERVICAL SPINE WO CONTRAST   Final Result   1. No acute fracture or traumatic malalignment of the cervical spine.   2. Advanced multilevel degenerative disc disease of the cervical spine, most   pronounced at C5-C6, significantly progressed compared to prior exam.   3. Enlarged, heterogeneous left thyroid with multiple nodules.         XR HIP LEFT (2-3 VIEWS)   Final Result   Displaced left subcapital fracture.         XR CHEST PORTABLE   Final Result   1. No acute cardiopulmonary process.   2. MediPort on the right the tip is in the right atrium.           Abnormal labs:   Abnormal Labs Reviewed   CBC WITH AUTO DIFFERENTIAL - Abnormal; Notable for the following components:       Result Value    WBC 11.5 (*)     RDW 15.5 (*)     Monocytes Absolute 1.5 (*)     Atypical  Lymphocytes Relative 7 (*)     All other components within normal limits   COMPREHENSIVE METABOLIC PANEL - Abnormal; Notable for the following components:    Sodium 132 (*)     Potassium 3.4 (*)     Chloride 96 (*)     Glucose 118 (*)     All other components within normal limits     Critical values: no  Intervention for critical value(s):       Abnormal Assessment Findings: Unable to straighten L leg jamel to fx/pain    Background  History:   Past Medical History:   Diagnosis Date    Arthritis     Cancer (HCC)     Breast cancer 30 years ago    High blood pressure     History of blood transfusion     PONV (postoperative nausea and vomiting)     after one surgery only    Seasonal allergies     Thyroid disease     polyp       Assessment    Vitals/MEWS: MEWS Score: 1  Level of Consciousness: Alert (0)   Vitals:    08/06/24 1909 08/06/24 2010 08/06/24 2054   BP: (!) 151/95 (!) 150/91 135/86   Pulse: 82     Resp: 18     Temp: 98.4 °F (36.9 °C)     TempSrc: Oral     SpO2: 96% 95% 94%   Weight: 68 kg (150 lb)     Height: 1.6 m (5' 3\")       FiO2 (%): 2L NC d/t desat after pain meds  O2 Flow Rate:      Cardiac Rhythm:    Pain Assessment: Verbal [ ] Verbal [ ] Hernandez Hammond Scale  Pain Scale: Pain Assessment  Pain Assessment: 0-10  Pain Level: 8  Pain Location: Hip  Pain Orientation: Left  Pain Descriptors: Sharp  Pain Type: Acute pain  Response to Pain Intervention: None (pain unchanged or no improvement)  Side Effects: No reported side effects  Last documented pain score (0-10 scale) Pain Level: 8  Last documented pain medication administered: Dilaudid  Mental Status: oriented, alert, coherent, logical, thought processes intact, and able to concentrate and follow conversation  NIH Score:    C-SSRS: Risk of Suicide: No Risk  Bedside swallow:    Jamie Coma Scale (GCS): [place] Coma Scale  Eye Opening: Spontaneous  Best Verbal Response: Oriented  Best Motor Response: Obeys commands  Jamie Coma Scale Score: 15  Active LDA's:

## 2024-08-07 NOTE — PROGRESS NOTES
Occupational Therapy/Physical Therapy  OT/PT orders received. Pt is to have surgery for L hip. Please reorder OT/PT post-op.  Feli Johns OTR/JOHNNY Molina PT, DPT

## 2024-08-07 NOTE — PLAN OF CARE
Pt scoring pain on 0-10 scale. Pain medications given per MAR. Pt instructed to call out when pain level increasing. Call light within reach.   Problem: Pain  Goal: Verbalizes/displays adequate comfort level or baseline comfort level  Outcome: Progressing

## 2024-08-07 NOTE — PROGRESS NOTES
..4 Eyes Skin Assessment     The patient is being assess for  Admission    I agree that 2 RN's have performed a thorough Head to Toe Skin Assessment on the patient. ALL assessment sites listed below have been assessed.       Areas assessed by both nurses: MAGDY Durand  [x]   Head, Face, and Ears   [x]   Shoulders, Back, and Chest  [x]   Arms, Elbows, and Hands   [x]   Coccyx, Sacrum, and IschIum  [x]   Legs, Feet, and Heels        Does the Patient have Skin Breakdown?  No         Ac Prevention initiated:  Yes   Wound Care Orders initiated:  No      [facility] nurse consulted for Pressure Injury (Stage 3,4, Unstageable, DTI, NWPT, and Complex wounds), New and Established Ostomies:  No      Nurse 1 eSignature: Electronically signed by Dominic Hernandez RN on 8/7/24 at 4:42 AM EDT    **SHARE this note so that the co-signing nurse is able to place an eSignature**    Nurse 2 eSignature: Electronically signed by Kizzy Hurt RN on 8/7/24 at 4:43 AM EDT

## 2024-08-07 NOTE — PROGRESS NOTES
V2.0    [facility] [place]      Name:  Misty Dumont /Age/Sex: 1940  (83 y.o. female)   MRN & CSN:  [id number] & [id number] Encounter Date/Time: 2024 11:55 AM EDT   Location:   PCP: Conrado Anand III, MD     Attending:Chon Colin MD       Hospital Day: 2    Assessment and Recommendations   Misty Dumont is a 83 y.o. female with pmh of HTN,, Anxiety, Arthritis, Breast Cancer, Thyroid surgery, Diffuse Large B cell lymphoma with recurrences, who presents with Displaced fracture of left femoral neck (HCC)    Plan:   Left hip fracture:  Displaced left subcapital fracture on Xray of left Hip.  Orthopedic consulted, ON schedule for Left hip hemiarthroplasty today  Noted elevated troponin  Cardiology consulted to assist with clearance     Leukocytosis note:  Noted atypical lymphocyte related?  Smear ordered     Hypokalemia: Replaced     Essential Hypertension:   -continue home medication  -BP monitoring     Elevated troponin:  Echo ordered, cardiology consulted, appreciate  No chest pain or history of cardiac disease    Cardilogy opinion:  -high CV risk for surgery due to advanced age, appears compensated on exam and may proceed as scheduled.  --Obtain echo to assess LVEF and wall motion.  --Resume home meds and replete electrolytes as needed.     Enlarged heterogenous thyroid:  -diagnosed on CT cervical spine on 2024  -Thyroid scan and ultrasound with TSH ordered        Diet Diet NPO Exceptions are: Ice Chips, Sips of Water with Meds   DVT Prophylaxis [] Lovenox, []  Heparin, [x] SCDs, [] Ambulation,  [] Eliquis, [] Xarelto  [] Coumadin   Code Status Full Code   Disposition From: home  Expected Disposition: home  Estimated Date of Discharge: 1-2 days  Patient requires continued admission due to Left femoral fracture   Surrogate Decision Maker/ POA       Personally reviewed Lab Studies and Imaging     Subjective:     Chief Complain:   Misty Dumont is a 83 y.o.

## 2024-08-07 NOTE — OP NOTE
Operative Note      Patient: Misty Dumont  YOB: 1940  MRN: [id number]    Date of Procedure: 8/7/2024    Pre-Op Diagnosis Codes:     * Displaced fracture of left femoral neck (HCC) [S72.002A]    Post-Op Diagnosis: Same       Procedure(s):  LEFT HIP HEMIARTHROPLASTY ANTERIOR APPROACH    Surgeon(s):  Singh Wood DO    Assistant:  Surgical Assistant: Jessica Brady; José Miguel Cantu Andrew Wigger, PA-C    Anesthesia: General and regional    Estimated Blood Loss (mL): 300     Complications: None    Specimens:   * No specimens in log *    Implants:  Implant Name Type Inv. Item Serial No.  Lot No. LRB No. Used Action   SHELL BPLR OD46MM ID28MM HIP CO CHROM RINGLOC - [id number]  SHELL BPLR OD46MM ID28MM HIP CO CHROM RINGLOC  [employer]WD [id number] Left 1 Implanted   STEM FEM UNIV 0+ 28 MM HIP COCR - [id number]  STEM FEM UNIV 0+ 28 MM HIP COCR  [employer]WD [id number] Left 1 Implanted   Avenir Complete High Offset Collared Size 5 - [id number]  Avenir Complete High Offset Collared Size 5  [employer]- [id number] Left 1 Implanted   Implants: Marin avenir hemiarthroplasty, 5 collared stem, 28+0 head, 46 shell/28 liner      Drains:   External Urinary Catheter (Active)   Site Assessment Clean,dry & intact 08/07/24 1018   Placement Replaced 08/07/24 1018   Securement Method Securing device (Describe) 08/07/24 1018   Catheter Care Catheter/Wick replaced 08/07/24 1018   Perineal Care Yes 08/07/24 1018   Suction 40 mmgHg continuous 08/07/24 1018   Urine Color Tracee 08/06/24 2330   Urine Appearance Clear 08/06/24 2330   Output (mL) 200 mL 08/07/24 0535       Findings:  Infection Present At Time Of Surgery (PATOS) (choose all levels that have infection present):  No infection present      Detailed Description of Procedure:   The patient is an 83F who sustained a displaced femoral neck fracture on the L side after a fall.  The patient is a low demand  well controlled before dissection down to the hip capsule.     Pericapsular fat was identified and excised.  A Willoughby elevator was utilized to identify the superior and inferior edges of the hip capsule as well as the inferior surface of the rectus.  #9 retractor was placed underneath the rectus over the rim of the acetabulum protecting the anterior neurovascular bundle through the rectus.  A #6 and #7 retractor were placed on the superior and inferior neck extracapsular.  A T-capsulectomy was then performed with Bovie cautery.  A large hematoma was noted and identified a femoral neck fracture.  The retractors were then moved intracapsularly.  The pubofemoral ligament was released until the lesser trochanter was palpable.  A fingerbreadth superior to the lesser trochanter was then marked for the inferior neck cut.  This cut was then performed up into the saddle.  Sagittal saw was utilized.  The femoral neck cut and remnant femoral head were removed.     The retractors were then placed around the anterior posterior and inferior aspects of the acetabulum.  The labrum was excised with Bovie cautery and pituitary.  The pulvinar was excised with Bovie cautery maintaining hemostasis of the ligamentum teres artery.  Before broaching was performed, the saddle of the femur was released with Bovie cautery to allow better excursion of the femur and better exposure of the acetabulum.  Great care was taken to avoid injuring the gluteus medius and minimus musculature.     Femoral preparation:  Attention was then turned to the femur.  All retractors were removed.  A femoral  hook was placed superficial to the vastus lateralis and deep to the gluteal sling.  The femur was then externally rotated to 90 degrees and a #8 retractor was placed medially to allow lateral excursion of the femur and to clear the greater trochanter underneath the acetabulum.  The leg was then dropped into 140 degrees of external rotation, extension, and

## 2024-08-07 NOTE — PROGRESS NOTES
Unable to perform NM THYROID UPTAKE AND SCAN at this facility (we do not have the equipment necessary).  This study can be done as an OP at [facility] or [facility].

## 2024-08-07 NOTE — ANESTHESIA PRE PROCEDURE
Department of Anesthesiology  Preprocedure Note       Name:  Misty Dumont   Age:  83 y.o.  :  1940                                          MRN:  [id number]         Date:  2024      Surgeon: Surgeon(s):  Singh Wood DO    Procedure: Procedure(s):  HIP HEMIARTHROPLASTY    Medications prior to admission:   Prior to Admission medications    Medication Sig Start Date End Date Taking? Authorizing Provider   meloxicam (MOBIC) 15 MG tablet TAKE 1 TABLET BY MOUTH EVERY DAY  Patient not taking: Reported on 2024   Hill Woodward PA-C   famotidine (PEPCID) 20 MG tablet Take 1 tablet by mouth 2 times daily 19   Joey Johnson, APRN - CNP   sertraline (ZOLOFT) 50 MG tablet Take 1 tablet by mouth daily    Daxa Giraldo MD   Probiotic Product (PROBIOTIC PO) Take 1 capsule by mouth daily    Daxa Giraldo MD   Hyaluronic Acid-Vitamin C (HYALURONIC ACID PO) Take 1 capsule by mouth daily    Daxa Giraldo MD   cetirizine (ZYRTEC) 10 MG tablet TAKE 1 TABLET BY MOUTH DAILY PRN  Patient not taking: Reported on 2024 8/15/17   Daxa Giraldo MD   fluticasone (FLONASE) 50 MCG/ACT nasal spray 2 sprays by Nasal route 17   Daxa Giraldo MD   propranolol (INDERAL) 10 MG tablet TAKE 1 TABLET BY MOUTH Daily 18   Daxa Giraldo MD   LORazepam (ATIVAN) 0.5 MG tablet Take 1 tablet by mouth. 17   Daxa Giraldo MD   traZODone (DESYREL) 100 MG tablet Take 1 tablet by mouth nightly    Daxa Giraldo MD       Current medications:    Current Facility-Administered Medications   Medication Dose Route Frequency Provider Last Rate Last Admin   • ceFAZolin (ANCEF) 2000 mg in 0.9% sodium chloride 100 mL IVPB  2,000 mg IntraVENous On Call to OR Shi Bateman PA       • HYDROmorphone (DILAUDID) injection 0.5 mg  0.5 mg IntraVENous Once Shawn Saunders MD       • famotidine (PEPCID) tablet 20 mg  20 mg Oral Daily Luke Antoine DO   20 mg

## 2024-08-07 NOTE — CONSULTS
CARDIOLOGY CONSULTATION        Patient Name: Misty Dumont  Date of admission: 8/6/2024  6:59 PM  Admission Dx: Displaced fracture of left femoral neck (formerly [facility]) [S72.002A]  Closed left hip fracture, initial encounter (formerly [facility]) [S72.002A]  Requesting Physician: Luke Antoine DO  Primary Care physician: Conrado Anand III, MD    Reason for Consultation/Chief Complaint: Pre Op Clearance    History of Present Illness:     Misty Dumont is a 83 y.o. woman with HTN, OA, and possible thyroid disease admitted after a mechanical fall at [facility].  She fell onto her left hip and was unable to bear weight. Her  is at the bedside. She denies any prior cardiac diagnoses or testing and she does not see a cardiologist regularly.  She reports occasional mild chest discomfort in the past but denies SOB, palpitations and dizziness.  In ED, imaging revealed displaced left femoral neck fracture and troponin was negative at 16 and 14.  Cardiology consulted for preoperative clearance.    Past Medical History:   has a past medical history of Arthritis, Cancer (HCC), High blood pressure, History of blood transfusion, PONV (postoperative nausea and vomiting), Seasonal allergies, and Thyroid disease.    Surgical History:   has a past surgical history that includes Thyroid surgery; Hysterectomy; Mastectomy; Breast surgery (Right); Total knee arthroplasty (Left, 4/30/2019); joint replacement; knee surgery; and Breast reduction surgery.     Social History:   reports that she has quit smoking. She has never used smokeless tobacco. She reports current alcohol use. She reports that she does not use drugs.     Family History:  family history includes Heart Disease in her father; Stroke in her father and mother.      Home Medications:  Were reviewed and are listed in nursing record and/or below  Prior to Admission medications    Medication Sig Start Date End Date Taking? Authorizing Provider   meloxicam (MOBIC) 15 MG tablet TAKE  review of symptoms completed. Pertinent positives identified in the HPI, all other review of symptoms negative as below.      Objective:     Vitals:    08/07/24 0815   BP: 129/75   Pulse: 79   Resp:    Temp: 98 °F (36.7 °C)   SpO2:     Weight - Scale: 68.5 kg (151 lb)       PHYSICAL EXAM:    BP (!) 142/83   Pulse 79   Temp 98 °F (36.7 °C)   Resp 20   Ht 1.6 m (5' 3\")   Wt 68.5 kg (151 lb)   SpO2 96%   BMI 26.75 kg/m²     General:  Alert, cooperative, no distress, appears stated age   Head:  Normocephalic, atraumatic   Eyes:  Conjunctiva/corneas clear, anicteric sclerae    Nose: Nares normal, no drainage or sinus tenderness   Throat: No abnormalities of the lips, oral mucosa or tongue.    Neck: Trachea midline. Neck supple with no lymphadenopathy, thyroid not enlarged, symmetric, no tenderness/mass/nodules, no Jugular venous pressure elevation    Lungs:   Clear to auscultation bilaterally,  no respiratory distress   Chest Wall:  No deformity or tenderness to palpation   Heart:  Regular rate and rhythm, normal S1, normal S2    Abdomen:   Soft, non-tender, with normoactive bowel sounds. No masses, no hepatosplenomegaly   Extremities: No cyanosis, clubbing or pitting edema, decrease ROM L hip   Vascular: 2+ radial, brachial, femoral, dorsalis pedis and posterior tibial pulses bilaterally. Brisk carotid upstrokes without carotid bruit.    Skin: Skin color, texture, turgor are normal with no rashes or ulceration.    Pysch: Euthymic mood, appropriate affect   Neurologic: Oriented to person, place and time. No slurred speech or facial asymmetry. No motor or sensory deficits on gross examination.         Labs:   CBC:   Lab Results   Component Value Date/Time    WBC 10.3 08/07/2024 05:22 AM    RBC 3.90 08/07/2024 05:22 AM    HGB 12.2 08/07/2024 05:22 AM    HCT 36.9 08/07/2024 05:22 AM    MCV 94.6 08/07/2024 05:22 AM    RDW 15.8 08/07/2024 05:22 AM     08/07/2024 05:22 AM     CMP:  Lab Results   Component Value

## 2024-08-07 NOTE — PLAN OF CARE
Department of Orthopedic Surgery  Physician Assistant   Consult Note        Reason for Consult:  left hip fracture  Requesting Physician: Chon Colin MD  Date of Service: 8/7/2024 1:01 PM    CHIEF COMPLAINT:  As Above    History Obtained From:  patient, spouse    HISTORY OF PRESENT ILLNESS:                The patient is a 83 y.o. female with  diffuse large B cell lymphoma who presents with above chief complaint.  Pt states she was in the parking lot at [facility] when she turned around and lost her balance, falling hard onto her left side. Pt was able to be assisted back into her car, and was driven to [facility] ED by her . Pt was found to have a left hip fracture and orthopedics was consulted. Pt lives independently at baseline with her  in a house, does not use an assistive device at baseline.     Past Medical History:        Diagnosis Date    Arthritis     Cancer (HCC)     Breast cancer 30 years ago    High blood pressure     History of blood transfusion     PONV (postoperative nausea and vomiting)     after one surgery only    Seasonal allergies     Thyroid disease     polyp     Past Surgical History:        Procedure Laterality Date    BREAST REDUCTION SURGERY      BREAST SURGERY Right     mastectomy with removal of lymph nodes    HYSTERECTOMY (CERVIX STATUS UNKNOWN)      JOINT REPLACEMENT      KNEE SURGERY      MASTECTOMY      THYROID SURGERY      TOTAL KNEE ARTHROPLASTY Left 4/30/2019    LEFT TOTAL KNEE MAKOPLASTY WITH ADDUCTOR CANAL BLOCK FOR PAIN CONTROL    MARTIN RODERICK performed by Galo Ortiz MD at [facility] OR         Medications Prior to Admission:   Prior to Admission medications    Medication Sig Start Date End Date Taking? Authorizing Provider   meloxicam (MOBIC) 15 MG tablet TAKE 1 TABLET BY MOUTH EVERY DAY  Patient not taking: Reported on 8/6/2024 5/4/20   Hill Woodward, PA-C   famotidine (PEPCID) 20 MG tablet Take 1 tablet by mouth 2 times daily 5/1/19   Joey Johnson, APRN - CNP    CBC:   Recent Labs     08/06/24 1920 08/07/24  0522   WBC 11.5* 10.3   HGB 13.0 12.2    243     BMP:    Recent Labs     08/06/24 1921 08/07/24  0522   * 135*   K 3.4* 4.7   CL 96* 97*   CO2 25 28   BUN 19 21*   CREATININE 0.9 0.9   GLUCOSE 118* 145*     INR: No results for input(s): \"INR\" in the last 72 hours.    Radiology:                                          XR HIP LEFT (2-3 VIEWS)   Final Result   Displaced left subcapital fracture.                                           IMPRESSION/RECOMMENDATIONS:    Assessment: Left subcapital femoral neck fracture, displaced    Plan:  1) Discussed case with on-call surgeon, Dr. Wood. Imaging reviewed, including left hip x-ray. Discussed management of injury with additional team members including oncology PA regarding anterior approach in the setting of previous lymphadenopathy secondary to lymphoma, no barriers from their standpoint. No other anticipated medical barriers to surgery, cardiology has evaluated the patient and deemed her to be high risk but compensated. Orders placed for antibiotics on call to OR, consent, NPO since midnight. The procedure was described in detail to the patient and family daughter Estela ALVARES over the phone, including risks/benefits/alternatives, expected post op hospital course, and general recovery. PT/OT to be ordered post op for evaluation. Anticipate discharge 2 to 3 days postoperatively to skilled nursing facility. All questions were answered to the patient and their family's satisfaction.           Thank you for the opportunity to consult on this patient.    GABY Fontanez

## 2024-08-07 NOTE — CONSULTS
Consultant: Singh Wood DO  From: Dr. Antoine  Reason: L hip pain    Chief Complaint   Patient presents with    Hip Pain     Pt fell today at [facility] on left hip. States she moved to fast and and the cart moved causing her to lose balance. Pt unable to move leg. Reports hitting head, but no LOC. Left leg appears to be rotated, but unable to determine length due to pt not being able to straighten leg.        History of Present Illness      Misty Dumont is a 83 y.o. female who presents with LEFT hip pain. Patient sustained a mechanical fall resulting in the injury. She fell at [facility] yesterday and was unable to stand. XR demonstrated a displaced left femoral neck fracture. She denies prior hip pain. She denies needing assistive devices to walk.    Baseline functional level: community ambulator        Past History       Past Medical History:   Diagnosis Date    Arthritis     Cancer (HCC)     Breast cancer 30 years ago    High blood pressure     History of blood transfusion     PONV (postoperative nausea and vomiting)     after one surgery only    Seasonal allergies     Thyroid disease     polyp     Past Surgical History:   Procedure Laterality Date    BREAST REDUCTION SURGERY      BREAST SURGERY Right     mastectomy with removal of lymph nodes    HYSTERECTOMY (CERVIX STATUS UNKNOWN)      JOINT REPLACEMENT      KNEE SURGERY      MASTECTOMY      THYROID SURGERY      TOTAL KNEE ARTHROPLASTY Left 4/30/2019    LEFT TOTAL KNEE MAKOPLASTY WITH ADDUCTOR CANAL BLOCK FOR PAIN CONTROL    MARTIN VAUGHN performed by Galo Ortiz MD at [facility] OR     Family History   Problem Relation Age of Onset    Stroke Mother     Heart Disease Father     Stroke Father      Social History     Tobacco Use    Smoking status: Former    Smokeless tobacco: Never   Substance Use Topics    Alcohol use: Yes     Comment: occasionally      No current facility-administered medications on file prior to encounter.     Current Outpatient Medications on  File Prior to Encounter   Medication Sig Dispense Refill    meloxicam (MOBIC) 15 MG tablet TAKE 1 TABLET BY MOUTH EVERY DAY (Patient not taking: Reported on 8/6/2024) 90 tablet 0    famotidine (PEPCID) 20 MG tablet Take 1 tablet by mouth 2 times daily 60 tablet 3    sertraline (ZOLOFT) 50 MG tablet Take 1 tablet by mouth daily      Probiotic Product (PROBIOTIC PO) Take 1 capsule by mouth daily      Hyaluronic Acid-Vitamin C (HYALURONIC ACID PO) Take 1 capsule by mouth daily      cetirizine (ZYRTEC) 10 MG tablet TAKE 1 TABLET BY MOUTH DAILY PRN (Patient not taking: Reported on 8/6/2024)      fluticasone (FLONASE) 50 MCG/ACT nasal spray 2 sprays by Nasal route      propranolol (INDERAL) 10 MG tablet TAKE 1 TABLET BY MOUTH Daily      LORazepam (ATIVAN) 0.5 MG tablet Take 1 tablet by mouth.      traZODone (DESYREL) 100 MG tablet Take 1 tablet by mouth nightly        Iodine, Prochlorperazine edisylate, Shellfish allergy, Shellfish-derived products, Morphine, and Sulfa antibiotics    Review of Systems      10-point ROS is negative other than what's mentioned in HPI.    Physical Exam    Based off 1997 Exam Criteria    BP (!) 143/90   Pulse 83   Temp 99.5 °F (37.5 °C) (Temporal)   Resp 16   Ht 1.6 m (5' 3\")   Wt 68.5 kg (151 lb)   SpO2 94%   BMI 26.75 kg/m²      Constitutional:       General: Patient is not in acute distress.     Appearance: Normal appearance.   Cardiovascular:      Rate and Rhythm: Normal rate and regular rhythm.      Pulses: Normal pulses.   Pulmonary:      Effort: Pulmonary effort is normal. No respiratory distress.   Neurological:      Mental Status: Patient is alert and oriented to person, place, and time. Mental status is at baseline.     Musculoskeletal:  L Hip: Leg resting short and ER, tender to palpation at hip. Compartments soft. EHL/FHL/TA/GS complex motor intact. Sural, saphenous, superificial/deep peroneal, and plantar nerve distribution sensation grossly intact. Dorsalis

## 2024-08-07 NOTE — PROGRESS NOTES
Pt brought to PACU. Report obtained from OR RN and anesthesia. Pt placed on monitor and O2 at 4lpm via nasal cannula. Vitals taken at this time are below:  Vitals:    08/07/24 1947   BP: 131/69   Pulse: 94   Resp: 14   Temp: 98.5 °F (36.9 °C)   SpO2: 94%     Pt is fully alert, answering questions, and following commands appropriately. Pt denies complaint of pain at this time. Pt's L hip surgical incision is clean, dry, and intact with surgical glue and a foam dressing in place. Pt's LLE pedal pulse palpable +3 with capillary refill < 3 sec.

## 2024-08-07 NOTE — ED PROVIDER NOTES
Emergency Department Attending Provider Note  Location: [facility]  ED  8/6/2024     Patient Identification  Misty Dumont is a 83 y.o. female      Misty Dumont was evaluated in the Emergency Department for fall with left hip pain. Although initial history and physical exam information was obtained by Rolando RIVAS (who also dictated a record of this visit), I personally saw the patient and performed a substantive portion of the visit including all aspects of the medical decision making.    Patient seen and evaluated.  Relevant records reviewed.  Patient is a 82 y/o F with pmhx of OA, HTN, thyroid disease who presents after a fall at [facility]. Patient reports she moved to fast and the cart moved causing her to lose balance. She landed on her left leg and unable to bear weight. Patient with possible head trauma. Denies any LOC. Denies additional injury. She is not anticoagulated    Patient presented hypertensive but vitals otherwise unremarkable.   On exam she has TTP left hip that is slightly shorted and rotated. Neurovascularly intact    Diagnostic studies reviewed and interpreted  CBC with a leukocytosis to 11.5, no evidence of anemia, normal platelets  CMP with hyponatremia to 132, low potassium of 3.4, low chloride of 96, hyperglycemic to 118, normal LFTs and bilirubin  X-ray of the chest with no acute abnormalities  XR left hip: displaced left subcapital fracture  CT head: no acute intracranial abnormality  CT cervical spine wo contrast: no acute fracture    Ortho consulted. Patient received IV morphine with moderate pain relief. She remained hemodynamically stable. Patient admitted to hospitalist service.     Exclusion criteria - the patient is NOT to be included for SEP-1 Core Measure due to: 2+ SIRS criteria are not met    Impression  Displaced left femoral neck fracture  Mechanical fall    IDr. Andino am the primary clinician of record.   I personally saw the patient and  independently provided 0 minutes of non-concurrent critical care out of the total shared critical care time provided.    This chart was generated in part by using Dragon Dictation system and may contain errors related to that system including errors in grammar, punctuation, and spelling, as well as words and phrases that may be inappropriate. If there are any questions or concerns please feel free to contact the dictating provider for clarification.     Divya Andino MD   [employer]        Divya Andino MD  08/06/24 2028       Divya Andino MD  08/07/24 045

## 2024-08-07 NOTE — H&P
Hospital Medicine History & Physical      PCP: Conrado Anand III, MD    Date of Admission: 8/6/2024    Date of Service: Pt seen/examined on 8/6/2024    Pt seen/examined face to face on and admitted as inpatient with expected LOS to be two days but can change depending on diagnostic work up and treatment response.     Chief Complaint:    Chief Complaint   Patient presents with    Hip Pain     Pt fell today at [employer] on left hip. States she moved to fast and and the cart moved causing her to lose balance. Pt unable to move leg. Reports hitting head, but no LOC. Left leg appears to be rotated, but unable to determine length due to pt not being able to straighten leg.            ASSESSMENT AND PLAN:    Active Hospital Problems    Diagnosis Date Noted    Displaced fracture of left femoral neck ([facility]) [S72.002A] 08/06/2024    Closed left hip fracture, initial encounter ([facility]) [S72.002A] 08/06/2024     Left hip fracture:  Orthopedic consulted, appreciated  Noted elevated troponin  Cardiology consulted to assist with clearance    Leukocytosis note:  Noted atypical lymphocyte related?  Smear ordered    Hypokalemia: Replaced    Essential Hypertension: Reviewed patient's medications and plan is to continue home medication    Hyponatremia:  Urine lites pending    Elevated troponin:  Echo ordered, cardiology consulted, appreciate  No chest pain    Enlarged heterogenous thyroid:  NM and ultrasound with TSH ordered    .Due to the above diagnosis makes the patient higher risk for morbidity and mortality requiring testing and treatment      Discussion with the primary ER physician in regards to symptoms, history, physical exam, diagnosis and treatment, collaborative decision was to admit the patient.    Diet: NPO except meds ordered    DVT Prophylaxis: Held    Dispo:   Expected LOS of two days      History Of Present Illness:      83 y.o. female who presented to [facility] with past medical history of arthritis,  heterogeneous left thyroid with multiple nodules.         XR HIP LEFT (2-3 VIEWS)   Final Result   Displaced left subcapital fracture.         XR CHEST PORTABLE   Final Result   1. No acute cardiopulmonary process.   2. MediPort on the right the tip is in the right atrium.         NM THYROID UPTAKE AND SCAN    (Results Pending)   US THYROID    (Results Pending)                Luke Antoine, DO

## 2024-08-07 NOTE — PLAN OF CARE
Ortho Plan of Care    Received a call regarding the patient's left hip. She suffered a fall. XR demonstrated a displaced left femoral neck fracture.     Plan  - NPO after midnight, hold any chemical anticoagulation  - Admission and risk stratification per IM greatly appreciated.  - Plan for OR tomorrow afternoon pending OR availability, left hip hemiarthroplasty  - Full consult to follow    Singh Wood,   08/06/24 8:53 PM

## 2024-08-07 NOTE — PROGRESS NOTES
Pt admitted to PACU, bay 5 for pre-op. NPO, procedure, and consents confirmed. Pt's  in waiting room w/o cell phone.

## 2024-08-07 NOTE — DISCHARGE INSTRUCTIONS
Singh Wood, DO           Discharge Instructions - Hip Fracture    Weight bearing status: Weight bearing as tolerated for the left leg  Keep dressing clean and dry.  Starting 3 days after surgery, Ok for daily dressing changes until wound is dry. Then leave open to air.  Dress with plastic bag and rubber band to seal and repeat with second bag and rubber band when showering. \"Press & Seal\" wrap also works for sealing the rubber bag when showering until wound is clean, dry, and no longer draining.  Physical Therapy for rehabilitation. Home PT to start, and we can transition to outpatient therapy if patient requires.  Ice (20 minutes on and off 1 hour) and elevate as needed to reduce swelling and throbbing pain.  Starting 3 days after surgery, if wound is no longer leaking, Ok to shower but no soaks or baths.  Advance diet as tolerated: begin with clear liquids.  Drink plenty of fluids.  Should urinate within 8 hours of surgery.  Call the office or come to Emergency Room if signs of infection appear (hot, swollen, red, draining pus, fever)  Take medications as prescribed.  Wean off narcotics (percocet/norco) as soon as possible. Do not take tylenol if still taking narcotics.  Follow up with Dr. Wood  10-14 days after surgery. Call [phone] to schedule.    Follow up with PCP for thyroid mass and thyroid nuclear scan   Follow up with Haem/onco for B cell lymphoma

## 2024-08-07 NOTE — PROGRESS NOTES
Per Anesthesiologist MD Chip hold Propranolol. RN held all PO morning meds. See MAR.    Clarita Torres RN

## 2024-08-07 NOTE — ED PROVIDER NOTES
cervical spine. 2. Advanced multilevel degenerative disc disease of the cervical spine, most pronounced at C5-C6, significantly progressed compared to prior exam. 3. Enlarged, heterogeneous left thyroid with multiple nodules.     CT HEAD WO CONTRAST    Result Date: 8/6/2024  EXAMINATION: CT OF THE HEAD WITHOUT CONTRAST  8/6/2024 7:45 pm TECHNIQUE: CT of the head was performed without the administration of intravenous contrast. Automated exposure control, iterative reconstruction, and/or weight based adjustment of the mA/kV was utilized to reduce the radiation dose to as low as reasonably achievable. COMPARISON: None. HISTORY: ORDERING SYSTEM PROVIDED HISTORY: fall TECHNOLOGIST PROVIDED HISTORY: Reason for exam:->fall Has a \"code stroke\" or \"stroke alert\" been called?->No Decision Support Exception - unselect if not a suspected or confirmed emergency medical condition->Emergency Medical Condition (MA) Reason for Exam: No HA nor neck pain - fell today FINDINGS: BRAIN/VENTRICLES: There is no acute intracranial hemorrhage, mass effect or midline shift.  No abnormal extra-axial fluid collection.  The gray-white differentiation is maintained without evidence of an acute infarct.  There is no evidence of hydrocephalus. There is mild periventricular white matter microvascular ischemic changes. ORBITS: The visualized portion of the orbits demonstrate no acute abnormality. SINUSES: The visualized paranasal sinuses and mastoid air cells demonstrate no acute abnormality. SOFT TISSUES/SKULL:  No acute abnormality of the visualized skull or soft tissues.     No acute intracranial abnormality.     XR HIP LEFT (2-3 VIEWS)    Result Date: 8/6/2024  EXAMINATION: TWO XRAY VIEWS OF THE LEFT HIP 8/6/2024 7:27 pm COMPARISON: None. HISTORY: ORDERING SYSTEM PROVIDED HISTORY: fall TECHNOLOGIST PROVIDED HISTORY: Reason for exam:->fall Reason for Exam: fall, left hip pain FINDINGS: There is displaced left subcapital/transcervical fracture.   No dislocation. The right hip is unremarkable.     Displaced left subcapital fracture.     XR CHEST PORTABLE    Result Date: 8/6/2024  EXAMINATION: ONE XRAY VIEW OF THE CHEST 8/6/2024 6:27 pm COMPARISON: None. HISTORY: ORDERING SYSTEM PROVIDED HISTORY: pain or SOB TECHNOLOGIST PROVIDED HISTORY: Reason for exam:->pain or SOB Reason for Exam: pain, sob FINDINGS: MediPort on the right the tip is in the right atrium. The mediastinum is unremarkable. The cardiac silhouette is normal size. Calcified granuloma in the right lung base measures 1.7 cm.     1. No acute cardiopulmonary process. 2. MediPort on the right the tip is in the right atrium.       No results found.    PROCEDURES   Unless otherwise noted below, none     Procedures    CRITICAL CARE TIME (.cctime)       PAST MEDICAL HISTORY      has a past medical history of Arthritis, Cancer (HCC), High blood pressure, History of blood transfusion, PONV (postoperative nausea and vomiting), Seasonal allergies, and Thyroid disease.     EMERGENCY DEPARTMENT COURSE and DIFFERENTIAL DIAGNOSIS/MDM:   Vitals:    Vitals:    08/06/24 2054 08/06/24 2124 08/06/24 2212 08/06/24 2330   BP: 135/86 139/83 (!) 148/63    Pulse:  73 81    Resp:  15 16 16   Temp:  97.9 °F (36.6 °C) 98 °F (36.7 °C)    TempSrc:  Oral Oral    SpO2: 94% 95% 91%    Weight:       Height:           Patient was given the following medications:  Medications   morphine sulfate (PF) injection 4 mg (4 mg IntraVENous Given 8/6/24 1920)   famotidine (PEPCID) tablet 20 mg (20 mg Oral Given 8/6/24 2108)   LORazepam (ATIVAN) tablet 0.5 mg (has no administration in time range)   propranolol (INDERAL) tablet 10 mg (has no administration in time range)   sertraline (ZOLOFT) tablet 50 mg (has no administration in time range)   traZODone (DESYREL) tablet 100 mg (has no administration in time range)   cetirizine (ZYRTEC) tablet 10 mg (has no administration in time range)   HYDROmorphone (DILAUDID) injection 0.25 mg ( IntraVENous

## 2024-08-07 NOTE — CARE COORDINATION
Case Management Assessment  Initial Evaluation    Date/Time of Evaluation: 8/7/2024 3:01 PM  Assessment Completed by: TOM CHAND RN    If patient is discharged prior to next notation, then this note serves as note for discharge by case management.    Patient Name: Misty Dumont                   YOB: 1940  Diagnosis: Displaced fracture of left femoral neck ([facility]) [S72.002A]  Closed left hip fracture, initial encounter ([facility]) [S72.002A]                   Date / Time: 8/6/2024  6:59 PM    Patient Admission Status: Inpatient   Readmission Risk (Low < 19, Mod (19-27), High > 27): Readmission Risk Score: 9.7    Current PCP: Conrado Anand III, MD  PCP verified by CM? Yes    Chart Reviewed: Yes      History Provided by: Patient  Patient Orientation: Alert and Oriented    Patient Cognition: Alert    Hospitalization in the last 30 days (Readmission):  No    If yes, Readmission Assessment in  Navigator will be completed.    Advance Directives:      Code Status: Full Code   Patient's Primary Decision Maker is: Legal Next of Kin    Primary Decision Maker: Estela Woodward  Child - [phone]    Discharge Planning:    Patient lives with: Spouse/Significant Other Type of Home: House  Primary Care Giver: Self  Patient Support Systems include: Spouse/Significant Other, Children   Current Financial resources: Medicare  Current community resources: None  Current services prior to admission: None            Current DME:              Type of Home Care services:  None    ADLS  Prior functional level: Independent in ADLs/IADLs  Current functional level: Assistance with the following:, Bathing, Dressing, Toileting, Cooking, Housework, Shopping, Mobility    PT AM-PAC:   /24  OT AM-PAC:   /24    Family can provide assistance at DC: Yes  Would you like Case Management to discuss the discharge plan with any other family members/significant others, and if so, who? Yes (spouse)  Plans to Return to Present

## 2024-08-08 LAB
ALBUMIN SERPL-MCNC: 3.6 G/DL (ref 3.4–5)
ALBUMIN/GLOB SERPL: 1.6 {RATIO} (ref 1.1–2.2)
ALP SERPL-CCNC: 68 U/L (ref 40–129)
ALT SERPL-CCNC: 13 U/L (ref 10–40)
ANION GAP SERPL CALCULATED.3IONS-SCNC: 9 MMOL/L (ref 3–16)
AST SERPL-CCNC: 18 U/L (ref 15–37)
BASOPHILS # BLD: 0 K/UL (ref 0–0.2)
BASOPHILS NFR BLD: 0.3 %
BILIRUB SERPL-MCNC: 0.5 MG/DL (ref 0–1)
BUN SERPL-MCNC: 15 MG/DL (ref 7–20)
CALCIUM SERPL-MCNC: 9 MG/DL (ref 8.3–10.6)
CHLORIDE SERPL-SCNC: 99 MMOL/L (ref 99–110)
CO2 SERPL-SCNC: 29 MMOL/L (ref 21–32)
CREAT SERPL-MCNC: 0.7 MG/DL (ref 0.6–1.2)
DEPRECATED RDW RBC AUTO: 16 % (ref 12.4–15.4)
EKG ATRIAL RATE: 77 BPM
EKG DIAGNOSIS: NORMAL
EKG P AXIS: 20 DEGREES
EKG P-R INTERVAL: 160 MS
EKG Q-T INTERVAL: 396 MS
EKG QRS DURATION: 74 MS
EKG QTC CALCULATION (BAZETT): 448 MS
EKG R AXIS: -47 DEGREES
EKG T AXIS: -4 DEGREES
EKG VENTRICULAR RATE: 77 BPM
EOSINOPHIL # BLD: 0 K/UL (ref 0–0.6)
EOSINOPHIL NFR BLD: 0 %
GFR SERPLBLD CREATININE-BSD FMLA CKD-EPI: 85 ML/MIN/{1.73_M2}
GLUCOSE SERPL-MCNC: 136 MG/DL (ref 70–99)
HCT VFR BLD AUTO: 30.2 % (ref 36–48)
HGB BLD-MCNC: 10.1 G/DL (ref 12–16)
LYMPHOCYTES # BLD: 0.7 K/UL (ref 1–5.1)
LYMPHOCYTES NFR BLD: 7.2 %
MCH RBC QN AUTO: 32.5 PG (ref 26–34)
MCHC RBC AUTO-ENTMCNC: 33.5 G/DL (ref 31–36)
MCV RBC AUTO: 96.8 FL (ref 80–100)
MONOCYTES # BLD: 1.9 K/UL (ref 0–1.3)
MONOCYTES NFR BLD: 20 %
NEUTROPHILS # BLD: 6.7 K/UL (ref 1.7–7.7)
NEUTROPHILS NFR BLD: 72.5 %
PATH INTERP BLD-IMP: NO
PATH INTERP BLD-IMP: NORMAL
PLATELET # BLD AUTO: 188 K/UL (ref 135–450)
PMV BLD AUTO: 6.9 FL (ref 5–10.5)
POTASSIUM SERPL-SCNC: 5 MMOL/L (ref 3.5–5.1)
PROT SERPL-MCNC: 5.9 G/DL (ref 6.4–8.2)
RBC # BLD AUTO: 3.12 M/UL (ref 4–5.2)
SODIUM SERPL-SCNC: 137 MMOL/L (ref 136–145)
WBC # BLD AUTO: 9.3 K/UL (ref 4–11)

## 2024-08-08 PROCEDURE — 94761 N-INVAS EAR/PLS OXIMETRY MLT: CPT

## 2024-08-08 PROCEDURE — 2700000000 HC OXYGEN THERAPY PER DAY

## 2024-08-08 PROCEDURE — 99232 SBSQ HOSP IP/OBS MODERATE 35: CPT | Performed by: INTERNAL MEDICINE

## 2024-08-08 PROCEDURE — 1200000000 HC SEMI PRIVATE

## 2024-08-08 PROCEDURE — 93010 ELECTROCARDIOGRAM REPORT: CPT | Performed by: INTERNAL MEDICINE

## 2024-08-08 PROCEDURE — 2580000003 HC RX 258: Performed by: STUDENT IN AN ORGANIZED HEALTH CARE EDUCATION/TRAINING PROGRAM

## 2024-08-08 PROCEDURE — 36415 COLL VENOUS BLD VENIPUNCTURE: CPT

## 2024-08-08 PROCEDURE — 80053 COMPREHEN METABOLIC PANEL: CPT

## 2024-08-08 PROCEDURE — 97530 THERAPEUTIC ACTIVITIES: CPT

## 2024-08-08 PROCEDURE — 97162 PT EVAL MOD COMPLEX 30 MIN: CPT

## 2024-08-08 PROCEDURE — 6360000002 HC RX W HCPCS: Performed by: STUDENT IN AN ORGANIZED HEALTH CARE EDUCATION/TRAINING PROGRAM

## 2024-08-08 PROCEDURE — 6370000000 HC RX 637 (ALT 250 FOR IP): Performed by: STUDENT IN AN ORGANIZED HEALTH CARE EDUCATION/TRAINING PROGRAM

## 2024-08-08 PROCEDURE — 97535 SELF CARE MNGMENT TRAINING: CPT

## 2024-08-08 PROCEDURE — 6370000000 HC RX 637 (ALT 250 FOR IP): Performed by: NURSE PRACTITIONER

## 2024-08-08 PROCEDURE — 6370000000 HC RX 637 (ALT 250 FOR IP): Performed by: SPECIALIST/TECHNOLOGIST

## 2024-08-08 PROCEDURE — 97166 OT EVAL MOD COMPLEX 45 MIN: CPT

## 2024-08-08 PROCEDURE — 85025 COMPLETE CBC W/AUTO DIFF WBC: CPT

## 2024-08-08 RX ORDER — OXYCODONE HYDROCHLORIDE 5 MG/1
10 TABLET ORAL EVERY 4 HOURS PRN
Status: DISCONTINUED | OUTPATIENT
Start: 2024-08-08 | End: 2024-08-10 | Stop reason: HOSPADM

## 2024-08-08 RX ORDER — ENOXAPARIN SODIUM 100 MG/ML
40 INJECTION SUBCUTANEOUS DAILY
Qty: 16.8 ML | Refills: 0 | Status: SHIPPED | OUTPATIENT
Start: 2024-08-09 | End: 2024-09-20

## 2024-08-08 RX ORDER — METHOCARBAMOL 500 MG/1
500 TABLET, FILM COATED ORAL 3 TIMES DAILY PRN
Status: DISCONTINUED | OUTPATIENT
Start: 2024-08-08 | End: 2024-08-10 | Stop reason: HOSPADM

## 2024-08-08 RX ORDER — ACETAMINOPHEN 325 MG/1
650 TABLET ORAL EVERY 6 HOURS
Status: DISCONTINUED | OUTPATIENT
Start: 2024-08-08 | End: 2024-08-10 | Stop reason: HOSPADM

## 2024-08-08 RX ORDER — POLYETHYLENE GLYCOL 3350 17 G/17G
17 POWDER, FOR SOLUTION ORAL DAILY
Status: DISCONTINUED | OUTPATIENT
Start: 2024-08-08 | End: 2024-08-10 | Stop reason: HOSPADM

## 2024-08-08 RX ORDER — POLYETHYLENE GLYCOL 3350 17 G/17G
17 POWDER, FOR SOLUTION ORAL DAILY
Qty: 30 PACKET | Refills: 0 | Status: SHIPPED | OUTPATIENT
Start: 2024-08-09 | End: 2024-09-08

## 2024-08-08 RX ORDER — OXYCODONE HYDROCHLORIDE 5 MG/1
5 TABLET ORAL EVERY 4 HOURS PRN
Status: DISCONTINUED | OUTPATIENT
Start: 2024-08-08 | End: 2024-08-10 | Stop reason: HOSPADM

## 2024-08-08 RX ORDER — ACETAMINOPHEN 325 MG/1
650 TABLET ORAL EVERY 6 HOURS
Qty: 240 TABLET | Refills: 0 | Status: SHIPPED | OUTPATIENT
Start: 2024-08-08 | End: 2024-09-07

## 2024-08-08 RX ORDER — OXYCODONE HYDROCHLORIDE 5 MG/1
5 TABLET ORAL EVERY 6 HOURS PRN
Qty: 28 TABLET | Refills: 0 | Status: SHIPPED | OUTPATIENT
Start: 2024-08-08 | End: 2024-08-15

## 2024-08-08 RX ORDER — ENOXAPARIN SODIUM 100 MG/ML
40 INJECTION SUBCUTANEOUS DAILY
Qty: 16.8 ML | Refills: 0 | Status: CANCELLED | OUTPATIENT
Start: 2024-08-09 | End: 2024-09-20

## 2024-08-08 RX ORDER — METHOCARBAMOL 500 MG/1
500 TABLET, FILM COATED ORAL 3 TIMES DAILY PRN
Qty: 30 TABLET | Refills: 0 | Status: SHIPPED | OUTPATIENT
Start: 2024-08-08 | End: 2024-08-18

## 2024-08-08 RX ADMIN — PROPRANOLOL HYDROCHLORIDE 10 MG: 10 TABLET ORAL at 08:34

## 2024-08-08 RX ADMIN — SODIUM CHLORIDE, PRESERVATIVE FREE 10 ML: 5 INJECTION INTRAVENOUS at 20:27

## 2024-08-08 RX ADMIN — ENOXAPARIN SODIUM 40 MG: 100 INJECTION SUBCUTANEOUS at 10:27

## 2024-08-08 RX ADMIN — Medication 2000 MG: at 10:31

## 2024-08-08 RX ADMIN — SERTRALINE 50 MG: 50 TABLET, FILM COATED ORAL at 08:34

## 2024-08-08 RX ADMIN — HYDROMORPHONE HYDROCHLORIDE 0.5 MG: 1 INJECTION, SOLUTION INTRAMUSCULAR; INTRAVENOUS; SUBCUTANEOUS at 03:30

## 2024-08-08 RX ADMIN — Medication 2000 MG: at 02:05

## 2024-08-08 RX ADMIN — OXYCODONE 5 MG: 5 TABLET ORAL at 02:09

## 2024-08-08 RX ADMIN — METHOCARBAMOL 500 MG: 500 TABLET ORAL at 20:24

## 2024-08-08 RX ADMIN — LORAZEPAM 0.5 MG: 0.5 TABLET ORAL at 02:09

## 2024-08-08 RX ADMIN — SODIUM CHLORIDE, PRESERVATIVE FREE 10 ML: 5 INJECTION INTRAVENOUS at 10:28

## 2024-08-08 RX ADMIN — ACETAMINOPHEN 650 MG: 325 TABLET, FILM COATED ORAL at 23:14

## 2024-08-08 RX ADMIN — FAMOTIDINE 20 MG: 20 TABLET, FILM COATED ORAL at 08:34

## 2024-08-08 RX ADMIN — SODIUM CHLORIDE 25 ML: 9 INJECTION, SOLUTION INTRAVENOUS at 10:29

## 2024-08-08 RX ADMIN — OXYCODONE 5 MG: 5 TABLET ORAL at 08:34

## 2024-08-08 RX ADMIN — ACETAMINOPHEN 650 MG: 325 TABLET, FILM COATED ORAL at 20:23

## 2024-08-08 RX ADMIN — POLYETHYLENE GLYCOL 3350 17 G: 17 POWDER, FOR SOLUTION ORAL at 12:40

## 2024-08-08 RX ADMIN — Medication 3 MG: at 20:24

## 2024-08-08 RX ADMIN — ACETAMINOPHEN 650 MG: 325 TABLET, FILM COATED ORAL at 12:40

## 2024-08-08 RX ADMIN — DIPHENHYDRAMINE HYDROCHLORIDE 25 MG: 25 TABLET ORAL at 20:24

## 2024-08-08 ASSESSMENT — PAIN SCALES - GENERAL
PAINLEVEL_OUTOF10: 10
PAINLEVEL_OUTOF10: 0
PAINLEVEL_OUTOF10: 4
PAINLEVEL_OUTOF10: 3
PAINLEVEL_OUTOF10: 0
PAINLEVEL_OUTOF10: 7

## 2024-08-08 ASSESSMENT — PAIN DESCRIPTION - PAIN TYPE: TYPE: SURGICAL PAIN

## 2024-08-08 ASSESSMENT — PAIN DESCRIPTION - ORIENTATION: ORIENTATION: LEFT

## 2024-08-08 ASSESSMENT — PAIN DESCRIPTION - LOCATION
LOCATION: HEAD
LOCATION: HIP

## 2024-08-08 ASSESSMENT — PAIN - FUNCTIONAL ASSESSMENT: PAIN_FUNCTIONAL_ASSESSMENT: PREVENTS OR INTERFERES SOME ACTIVE ACTIVITIES AND ADLS

## 2024-08-08 ASSESSMENT — PAIN DESCRIPTION - DESCRIPTORS
DESCRIPTORS: ACHING
DESCRIPTORS: ACHING

## 2024-08-08 NOTE — DISCHARGE INSTR - COC
Continuity of Care Form    Patient Name: Misty Dumont   :  1940  MRN:  [id number]    Admit date:  2024  Discharge date:  8/10/24    Code Status Order: Full Code   Advance Directives:   Advance Care Flowsheet Documentation       Date/Time Healthcare Directive Type of Healthcare Directive Copy in Chart Healthcare Agent Appointed Healthcare Agent's Name Healthcare Agent's Phone Number    24 [phone] Yes, patient has an advance directive for healthcare treatment Durable power of  for health care -- -- -- --            Admitting Physician:  Luke Antoine DO  PCP: Cornado Anand III, MD    Discharging Nurse: MAGDY Cárdenas  Discharging Hospital Unit/Room#: 0528/0528-01  Discharging Unit Phone Number: [phone]    Emergency Contact:   Extended Emergency Contact Information  Primary Emergency Contact: Estela Woodward  Home Phone: [phone]  Relation: Child  Secondary Emergency Contact: JuliaMiah  Address: [address] [place] [address]            [address]  Home Phone: [phone]  Mobile Phone: [phone]  Relation: Spouse    Past Surgical History:  Past Surgical History:   Procedure Laterality Date    BREAST REDUCTION SURGERY      BREAST SURGERY Right     mastectomy with removal of lymph nodes    HIP SURGERY Left 2024    HIP HEMIARTHROPLASTY performed by Singh Wood DO at [facility] OR    HYSTERECTOMY (CERVIX STATUS UNKNOWN)      JOINT REPLACEMENT      KNEE SURGERY      MASTECTOMY      THYROID SURGERY      TOTAL KNEE ARTHROPLASTY Left 2019    LEFT TOTAL KNEE MAKOPLASTY WITH ADDUCTOR CANAL BLOCK FOR PAIN CONTROL    MARTIN RODERICK performed by Galo Ortiz MD at [facility] OR       Immunization History:   Immunization History   Administered Date(s) Administered    COVID-19, MODERNA BLUE border, Primary or Immunocompromised, (age 12y+), IM, 100 mcg/0.5mL 2021, 2021    COVID-19, PFIZER, ( formula), (age 12y+), IM, 30mcg/0.3mL 2023     tolerated             Discharge Instructions - Hip Fracture    Weight bearing status: Weight bearing as tolerated for the left leg  Keep dressing clean and dry.  Starting 3 days after surgery, Ok for daily dressing changes until wound is dry. Then leave open to air.  Dress with plastic bag and rubber band to seal and repeat with second bag and rubber band when showering. \"Press & Seal\" wrap also works for sealing the rubber bag when showering until wound is clean, dry, and no longer draining.  Physical Therapy for rehabilitation. Home PT to start, and we can transition to outpatient therapy if patient requires.  Ice (20 minutes on and off 1 hour) and elevate as needed to reduce swelling and throbbing pain.  Starting 3 days after surgery, if wound is no longer leaking, Ok to shower but no soaks or baths.  Advance diet as tolerated: begin with clear liquids.  Drink plenty of fluids.  Should urinate within 8 hours of surgery.  Call the office or come to Emergency Room if signs of infection appear (hot, swollen, red, draining pus, fever)  Take medications as prescribed.  Wean off narcotics (percocet/norco) as soon as possible. Do not take tylenol if still taking narcotics.  Follow up with Dr. Wood  10-14 days after surgery. Call [phone] to schedule.    Physician Certification: I certify the above information and transfer of Misty Dumont  is necessary for the continuing treatment of the diagnosis listed and that she requires Skilled Nursing Facility for greater than 30 days.     Update Admission H&P: No change in H&P    PHYSICIAN SIGNATURE:  Electronically signed by Alix Denis MD on 8/8/24 at 11:43 AM EDT CHLOE IRVING MD  8/10/2024  1:40 PM

## 2024-08-08 NOTE — PROGRESS NOTES
Pt tolerating PO water without difficulty. Pt weaned down to 2lpm O2 via nasal cannula. Purewick applied and pt able to void a small (<50mL) amount.

## 2024-08-08 NOTE — CARE COORDINATION
Chart reviewed day 2. Pt is followed by IM, Ortho and Cards. POD 1 L Fem. Anticipate d/c tomorrow. Cert approved for [facility] per pt preference. Will follow for needs as they arise. Electronically signed by TOM CHAND RN on 8/8/2024 at 1:25 PM

## 2024-08-08 NOTE — PROGRESS NOTES
Occupational Therapy  Facility/Department: [place] [address] - MED SURG/ORTHO  Occupational Therapy Initial Assessment and Treatment Note    Name: Misty Dumont  : 1940  MRN: [id number]  Date of Service: 2024    Discharge Recommendations:  Subacute/Skilled Nursing Facility  OT Equipment Recommendations  Equipment Needed: No (defer)     Therapy discharge recommendations are subject to collaboration from the patient’s interdisciplinary healthcare team, including MD and case management recommendations.    Barriers to Home Discharge:   [x] Steps to access home entry or bed/bath:   [x] Unable to transfer, ambulate, or propel wheelchair household distances without assist   [x] Limited available assist at home upon discharge    [] Patient or family requests d/c to post-acute facility    [x] Poor cognition/safety awareness for d/c to home alone    [] Unable to maintain ordered weight bearing status    [] Patient with salient signs of long-standing immobility   [x] Decreased independence with ADLs   [x] Increased risk for falls   [] Other:    If pt is unable to be seen after this session, please let this note serve as discharge summary.  Please see case management note for discharge disposition.  Thank you.    Patient Diagnosis(es): The primary encounter diagnosis was Closed left hip fracture, initial encounter ([facility]). Diagnoses of Displaced fracture of left femoral neck ([facility]) and Preop cardiovascular exam were also pertinent to this visit.  Past Medical History:  has a past medical history of Arthritis, Cancer (HCC), High blood pressure, History of blood transfusion, PONV (postoperative nausea and vomiting), Seasonal allergies, and Thyroid disease.  Past Surgical History:  has a past surgical history that includes Thyroid surgery; Hysterectomy; Mastectomy; Breast surgery (Right); Total knee arthroplasty (Left, 2019); joint replacement; knee surgery; and Breast reduction surgery.       Assessment   Performance

## 2024-08-08 NOTE — PROGRESS NOTES
Department of Orthopedic Surgery  Physician Assistant   Progress Note    Subjective:       Systemic or Specific Complaints:Pain Control and was not able to participate much with therapy secondary to pain. Pt confused today. Tolerating p.o., voiding.   at bedside    Objective:     Patient Vitals for the past 24 hrs:   BP Temp Temp src Pulse Resp SpO2 Height Weight   08/08/24 0817 130/73 -- -- 100 18 95 % -- --   08/08/24 0315 103/65 98.2 °F (36.8 °C) -- 86 16 93 % -- --   08/08/24 0209 -- -- -- -- 16 -- -- --   08/07/24 2327 -- 98 °F (36.7 °C) Oral 85 16 96 % -- --   08/07/24 2217 119/71 97.9 °F (36.6 °C) Oral 85 16 96 % -- --   08/07/24 2111 126/78 98.6 °F (37 °C) Oral 84 16 94 % -- --   08/07/24 2110 134/75 -- -- 88 16 92 % -- --   08/07/24 2105 138/79 -- -- 85 26 92 % -- --   08/07/24 2100 (!) 143/77 -- -- 83 12 96 % -- --   08/07/24 2055 130/84 -- -- 82 14 96 % -- --   08/07/24 2050 (!) 131/103 -- -- 81 14 98 % -- --   08/07/24 2045 125/74 -- -- 84 12 97 % -- --   08/07/24 2040 130/79 -- -- 84 22 97 % -- --   08/07/24 2035 128/78 -- -- 84 24 95 % -- --   08/07/24 2030 132/72 -- -- 87 17 96 % -- --   08/07/24 2025 -- -- -- 87 24 96 % -- --   08/07/24 2022 -- -- -- -- 16 -- -- --   08/07/24 2020 137/87 -- -- 87 17 96 % -- --   08/07/24 2015 135/81 -- -- 89 14 97 % -- --   08/07/24 2010 136/87 -- -- 87 17 97 % -- --   08/07/24 2005 138/75 99 °F (37.2 °C) Temporal 91 22 96 % -- --   08/07/24 2000 133/76 -- -- 91 24 96 % -- --   08/07/24 1955 133/76 -- -- 90 11 95 % -- --   08/07/24 1950 -- -- -- 98 28 97 % -- --   08/07/24 1947 131/69 98.5 °F (36.9 °C) Temporal 94 14 94 % -- --   08/07/24 1535 (!) 143/90 99.5 °F (37.5 °C) Temporal 83 16 94 % -- --   08/07/24 1515 -- 98.3 °F (36.8 °C) Oral 84 -- 92 % -- --   08/07/24 1452 -- -- -- -- 20 -- -- --   08/07/24 1445 (!) 151/75 -- -- -- -- -- -- --   08/07/24 1315 134/85 -- -- -- -- -- 1.6 m (5' 3\") 68.5 kg (151 lb)   08/07/24 1115 134/85 98.6 °F (37 °C) -- 78 --  96 % -- --   08/07/24 1111 134/85 98.6 °F (37 °C) Oral 80 20 -- -- --       General: alert, appears stated age, cooperative, and no distress   Wound: Wound clean and dry no evidence of infection., No Erythema, No Drainage, and Positive for Edema   Motion: Painful range of Motion in affected extremity   DVT Exam: No evidence of DVT seen on physical exam.  No cords or calf tenderness.  No significant calf/ankle edema.     Additional exam: Patient seen sitting up in bed at time of interview  Leg lengths equal, right/uninvolved leg baseline varus, rotational alignment of surgical leg neutral  Thigh mild swelling as expected, compartments compressible  Tender to palpation about the anterior thigh  EHL, FHL, gastroc, and anterior tibialis motor intact  Sensation intact to light touch  DP and PT pulses 2+      Data Review  CBC:   Lab Results   Component Value Date/Time    WBC 9.3 08/08/2024 05:12 AM    RBC 3.12 08/08/2024 05:12 AM    HGB 10.1 08/08/2024 05:12 AM    HCT 30.2 08/08/2024 05:12 AM     08/08/2024 05:12 AM       Renal:   Lab Results   Component Value Date/Time     08/08/2024 05:12 AM    K 5.0 08/08/2024 05:12 AM    CL 99 08/08/2024 05:12 AM    CO2 29 08/08/2024 05:12 AM    BUN 15 08/08/2024 05:12 AM    CREATININE 0.7 08/08/2024 05:12 AM    GLUCOSE 136 08/08/2024 05:12 AM    CALCIUM 9.0 08/08/2024 05:12 AM          Assessment:      left hip hemiarthroplasty, anterior approach, POD 1  DOS 8/7/24 by Dr Wood.     Plan:      1:  WBAT to the LLE with assistive device, no hip precautions indicated  2:  Continue Deep venous thrombosis prophylaxis- lovenox 40mg daily for 6 weeks post op  3:  Continue Pain Control  4:  PT/OT  5:  Two doses IV ancef completed post op   6:  Discharge pending pain control, progress with therapy. DCP updated, prescriptions printed, signed, placed in soft chart. Follow up with surgeon as scheduled      GABY Fontanez

## 2024-08-08 NOTE — PROGRESS NOTES
Physical Therapy  Facility/Department: [place] [address] - MED SURG/ORTHO  Physical Therapy Initial Assessment    Name: Misty Dumont  : 1940  MRN: [id number]  Date of Service: 2024    Discharge Recommendations:  Subacute/Skilled Nursing Facility   PT Equipment Recommendations  Equipment Needed: No  Other: TBD per accepting facility    Therapy discharge recommendations take into account each patient's current medical complexities and are subject to input/oversight from the patient's healthcare team.   Barriers to Home Discharge:   [x] Steps to access home entry or bed/bath: 2 HEATHER without rails   [x] Unable to transfer, ambulate, or propel wheelchair household distances without assist   [x] Limited available assist at home upon discharge    [] Patient or family requests d/c to post-acute facility    [x] Poor cognition/safety awareness for d/c to home alone    []Unable to maintain ordered weight bearing status    [] Patient with salient signs of long-standing immobility   [x] Patient is at risk for falls due to: pain, weakness, level of assist   [] Other:    If pt is unable to be seen after this session, please let this note serve as discharge summary.  Please see case management note for discharge disposition.  Thank you.        Patient Diagnosis(es): The primary encounter diagnosis was Closed left hip fracture, initial encounter (HCC). Diagnoses of Displaced fracture of left femoral neck (HCC) and Preop cardiovascular exam were also pertinent to this visit.  Past Medical History:  has a past medical history of Arthritis, Cancer (HCC), High blood pressure, History of blood transfusion, PONV (postoperative nausea and vomiting), Seasonal allergies, and Thyroid disease.  Past Surgical History:  has a past surgical history that includes Thyroid surgery; Hysterectomy; Mastectomy; Breast surgery (Right); Total knee arthroplasty (Left, 2019); joint replacement; knee surgery; Breast reduction surgery; and hip

## 2024-08-08 NOTE — ANESTHESIA POSTPROCEDURE EVALUATION
Department of Anesthesiology  Postprocedure Note    Patient: Misyt Dumont  MRN: [id number]  YOB: 1940  Date of evaluation: 8/7/2024    Procedure Summary       Date: 08/07/24 Room / Location: [address]    Anesthesia Start: 1804 Anesthesia Stop: 1944    Procedure: HIP HEMIARTHROPLASTY (Left: Hip) Diagnosis:       Displaced fracture of left femoral neck (HCC)      (Displaced fracture of left femoral neck (HCC) [S72.002A])    Surgeons: Singh Wood DO Responsible Provider: Shawn Saunders MD    Anesthesia Type: general ASA Status: 3            Anesthesia Type: No value filed.    Pretty Phase I: Pretty Score: 9    Pretty Phase II:      Anesthesia Post Evaluation    Patient location during evaluation: PACU  Level of consciousness: awake  Airway patency: patent  Nausea & Vomiting: no vomiting  Cardiovascular status: blood pressure returned to baseline  Respiratory status: acceptable  Hydration status: stable  Pain management: adequate    No notable events documented.

## 2024-08-08 NOTE — PROGRESS NOTES
Pt transported to assigned room #528 in stable condition by this RN. Bedside report given to C5 RN Erendira. All questions answered at this time. Pt's  Miah at the bedside upon arrival.
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
## 2024-08-08 NOTE — PROGRESS NOTES
CARDIOLOGY PROGRESS NOTE        Patient Name: Misty Dumont  Date of admission: 8/6/2024  6:59 PM  Admission Dx: Displaced fracture of left femoral neck ([facility]) [S72.002A]  Closed left hip fracture, initial encounter ([facility]) [S72.002A]  Requesting Physician: Luke Antoine DO  Primary Care physician: Conrado Anand III, MD    Reason for Consultation/Chief Complaint: Pre Op Clearance    History of Present Illness:     Misty Dumont is a 83 y.o. woman with HTN, OA, and possible thyroid disease admitted after a mechanical fall at [facility].  She fell onto her left hip and was unable to bear weight. Her  is at the bedside. She denies any prior cardiac diagnoses or testing and she does not see a cardiologist regularly.  She reports occasional mild chest discomfort in the past but denies SOB, palpitations and dizziness.  In ED, imaging revealed displaced left femoral neck fracture and troponin was negative at 16 and 14.  Cardiology consulted for preoperative clearance.    08/08 --  Patient stable overnight and now s/p L hip hemiarthroplasty, stable on tele.  Patient complains of hip pain but no CP or SOB.    Past Medical History:   has a past medical history of Arthritis, Cancer (HCC), High blood pressure, History of blood transfusion, PONV (postoperative nausea and vomiting), Seasonal allergies, and Thyroid disease.    Surgical History:   has a past surgical history that includes Thyroid surgery; Hysterectomy; Mastectomy; Breast surgery (Right); Total knee arthroplasty (Left, 4/30/2019); joint replacement; knee surgery; and Breast reduction surgery.     Social History:   reports that she has quit smoking. She has never used smokeless tobacco. She reports current alcohol use. She reports that she does not use drugs.     Family History:  family history includes Heart Disease in her father; Stroke in her father and mother.      Home Medications:  Were reviewed and are listed in nursing record and/or

## 2024-08-08 NOTE — PLAN OF CARE
Problem: Discharge Planning  Goal: Discharge to home or other facility with appropriate resources  Outcome: Progressing     Problem: Pain  Goal: Verbalizes/displays adequate comfort level or baseline comfort level  8/8/2024 0014 by Mirza Longo, RN  Outcome: Progressing  8/7/2024 1507 by Clarita Torres, RN  Outcome: Progressing     Problem: Safety - Adult  Goal: Free from fall injury  Outcome: Progressing     Problem: ABCDS Injury Assessment  Goal: Absence of physical injury  Outcome: Progressing     Problem: Skin/Tissue Integrity  Goal: Absence of new skin breakdown  Description: 1.  Monitor for areas of redness and/or skin breakdown  2.  Assess vascular access sites hourly  3.  Every 4-6 hours minimum:  Change oxygen saturation probe site  4.  Every 4-6 hours:  If on nasal continuous positive airway pressure, respiratory therapy assess nares and determine need for appliance change or resting period.  Outcome: Progressing

## 2024-08-08 NOTE — PROGRESS NOTES
V2.0    [facility] Progress Note      Name:  Misty Dumont /Age/Sex: 1940  (83 y.o. female)   MRN & CSN:  [id number] & [id number] Encounter Date/Time: 2024 11:55 AM EDT   Location:   PCP: Conrado Anand III, MD     Attending:Chon Colin MD       Hospital Day: 3    Assessment and Recommendations   Misty Dumont is a 83 y.o. 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 She complaint of pain at surgical site. Nurse was at bed side to give pain meds. Denies any other concerns.  sitting in chair next to patient. Reports that she is upset due to pain.      Plan:   Left hip fracture:  Displaced left subcapital fracture on Xray of left Hip.  Orthopedic consulted, Left hip hemiarthroplasty done on 2024  Noted elevated troponin    Leukocytosis note:  Noted atypical lymphocyte related?  Smear ordered     Essential Hypertension:   -continue home medication  -BP monitoring, 130/70 today     Elevated troponin; Cardiology consult  Cardiology consulted; cleared for anaesthesia and procedure  Echo (2024): EF of 55 - 60% , Type I diastolic dysfunction with normal LAP. .  --Resume home meds and replete electrolytes as needed.     Enlarged heterogenous thyroid:  -diagnosed on CT cervical spine on 2024  -Thyroid scan not available,  refer as out patient  -Thyroid ultrasound :Heterogeneous thyroid gland with a dominant 13 mm TR 3 left thyroid nodule. Cystic, no calcifications   -TSH 3.39    Diffuse Large B cell lymphoma with recurrences;  -she has completed 8 cycles of Monoclonal Ab for second trial.   Will refer to her oncologist UC haem/onc regarding further management    ORTHO reevaluation:             1:  WBAT to the LLE with assistive device, no  08/08/24  0512   * 135* 137   K 3.4* 4.7 5.0   CL 96* 97* 99   CO2 25 28 29   BUN 19 21* 15   CREATININE 0.9 0.9 0.7   GLUCOSE 118* 145* 136*       Hepatic:   Recent Labs     08/06/24  1921 08/07/24 0522 08/08/24  0512   AST 22 18 18   ALT 22 17 13   BILITOT 0.4 0.5 0.5   ALKPHOS 94 83 68       Lipids: No results found for: \"CHOL\", \"HDL\", \"TRIG\"  Hemoglobin A1C:   Lab Results   Component Value Date/Time    LABA1C 6.0 05/01/2019 05:54 AM     TSH:   Lab Results   Component Value Date/Time    TSH 3.81 08/07/2024 05:22 AM     Troponin: No results found for: \"TROPONINT\"  Lactic Acid: No results for input(s): \"LACTA\" in the last 72 hours.  BNP: No results for input(s): \"PROBNP\" in the last 72 hours.  UA:  Lab Results   Component Value Date/Time    NITRU Negative 08/07/2024 05:26 AM    COLORU Yellow 08/07/2024 05:26 AM    PHUR 6.0 08/07/2024 05:26 AM    PHUR 6.5 04/28/2022 11:32 PM    WBCUA 20-50 05/15/2019 12:05 PM    RBCUA 3-5 05/15/2019 12:05 PM    MUCUS 1+ 05/15/2019 12:05 PM    BACTERIA Rare 05/15/2019 12:05 PM    CLARITYU Clear 08/07/2024 05:26 AM    LEUKOCYTESUR Negative 08/07/2024 05:26 AM    UROBILINOGEN 0.2 08/07/2024 05:26 AM    BILIRUBINUR SMALL 08/07/2024 05:26 AM    BLOODU Negative 08/07/2024 05:26 AM    GLUCOSEU Negative 08/07/2024 05:26 AM    KETUA Negative 08/07/2024 05:26 AM     Urine Cultures:   Lab Results   Component Value Date/Time    LABURIN  05/15/2019 12:05 PM     <10,000 CFU/ml mixed skin/urogenital anthony. No further workup     Blood Cultures: No results found for: \"BC\"  No results found for: \"BLOODCULT2\"  Organism: No results found for: \"ORG\"      Electronically signed by Alix Denis MD on 8/8/2024 at 9:33 AM

## 2024-08-09 LAB
ALBUMIN SERPL-MCNC: 3.1 G/DL (ref 3.4–5)
ALBUMIN/GLOB SERPL: 1.3 {RATIO} (ref 1.1–2.2)
ALP SERPL-CCNC: 65 U/L (ref 40–129)
ALT SERPL-CCNC: 6 U/L (ref 10–40)
ANION GAP SERPL CALCULATED.3IONS-SCNC: 9 MMOL/L (ref 3–16)
AST SERPL-CCNC: 16 U/L (ref 15–37)
BASOPHILS # BLD: 0 K/UL (ref 0–0.2)
BASOPHILS NFR BLD: 0.5 %
BILIRUB SERPL-MCNC: 0.7 MG/DL (ref 0–1)
BUN SERPL-MCNC: 16 MG/DL (ref 7–20)
CALCIUM SERPL-MCNC: 8.5 MG/DL (ref 8.3–10.6)
CHLORIDE SERPL-SCNC: 98 MMOL/L (ref 99–110)
CO2 SERPL-SCNC: 27 MMOL/L (ref 21–32)
CREAT SERPL-MCNC: 0.7 MG/DL (ref 0.6–1.2)
DEPRECATED RDW RBC AUTO: 16 % (ref 12.4–15.4)
EOSINOPHIL # BLD: 0.2 K/UL (ref 0–0.6)
EOSINOPHIL NFR BLD: 3.2 %
GFR SERPLBLD CREATININE-BSD FMLA CKD-EPI: 85 ML/MIN/{1.73_M2}
GLUCOSE SERPL-MCNC: 114 MG/DL (ref 70–99)
HCT VFR BLD AUTO: 27.1 % (ref 36–48)
HGB BLD-MCNC: 9 G/DL (ref 12–16)
LYMPHOCYTES # BLD: 0.8 K/UL (ref 1–5.1)
LYMPHOCYTES NFR BLD: 10.6 %
MCH RBC QN AUTO: 31.8 PG (ref 26–34)
MCHC RBC AUTO-ENTMCNC: 33.1 G/DL (ref 31–36)
MCV RBC AUTO: 96.2 FL (ref 80–100)
MONOCYTES # BLD: 1.5 K/UL (ref 0–1.3)
MONOCYTES NFR BLD: 19.7 %
NEUTROPHILS # BLD: 5.1 K/UL (ref 1.7–7.7)
NEUTROPHILS NFR BLD: 66 %
PLATELET # BLD AUTO: 145 K/UL (ref 135–450)
PMV BLD AUTO: 7 FL (ref 5–10.5)
POTASSIUM SERPL-SCNC: 4 MMOL/L (ref 3.5–5.1)
PROT SERPL-MCNC: 5.4 G/DL (ref 6.4–8.2)
RBC # BLD AUTO: 2.82 M/UL (ref 4–5.2)
SODIUM SERPL-SCNC: 134 MMOL/L (ref 136–145)
WBC # BLD AUTO: 7.7 K/UL (ref 4–11)

## 2024-08-09 PROCEDURE — 97530 THERAPEUTIC ACTIVITIES: CPT

## 2024-08-09 PROCEDURE — 6370000000 HC RX 637 (ALT 250 FOR IP): Performed by: NURSE PRACTITIONER

## 2024-08-09 PROCEDURE — 2700000000 HC OXYGEN THERAPY PER DAY

## 2024-08-09 PROCEDURE — 80053 COMPREHEN METABOLIC PANEL: CPT

## 2024-08-09 PROCEDURE — 97110 THERAPEUTIC EXERCISES: CPT

## 2024-08-09 PROCEDURE — 6370000000 HC RX 637 (ALT 250 FOR IP): Performed by: STUDENT IN AN ORGANIZED HEALTH CARE EDUCATION/TRAINING PROGRAM

## 2024-08-09 PROCEDURE — 94761 N-INVAS EAR/PLS OXIMETRY MLT: CPT

## 2024-08-09 PROCEDURE — 1200000000 HC SEMI PRIVATE

## 2024-08-09 PROCEDURE — 97535 SELF CARE MNGMENT TRAINING: CPT

## 2024-08-09 PROCEDURE — 6360000002 HC RX W HCPCS: Performed by: STUDENT IN AN ORGANIZED HEALTH CARE EDUCATION/TRAINING PROGRAM

## 2024-08-09 PROCEDURE — APPNB45 APP NON BILLABLE 31-45 MINUTES: Performed by: SPECIALIST/TECHNOLOGIST

## 2024-08-09 PROCEDURE — 6370000000 HC RX 637 (ALT 250 FOR IP): Performed by: SPECIALIST/TECHNOLOGIST

## 2024-08-09 PROCEDURE — 2580000003 HC RX 258: Performed by: STUDENT IN AN ORGANIZED HEALTH CARE EDUCATION/TRAINING PROGRAM

## 2024-08-09 PROCEDURE — 85025 COMPLETE CBC W/AUTO DIFF WBC: CPT

## 2024-08-09 PROCEDURE — 36415 COLL VENOUS BLD VENIPUNCTURE: CPT

## 2024-08-09 PROCEDURE — 99024 POSTOP FOLLOW-UP VISIT: CPT | Performed by: SPECIALIST/TECHNOLOGIST

## 2024-08-09 RX ADMIN — ENOXAPARIN SODIUM 40 MG: 100 INJECTION SUBCUTANEOUS at 09:45

## 2024-08-09 RX ADMIN — PROPRANOLOL HYDROCHLORIDE 10 MG: 10 TABLET ORAL at 09:44

## 2024-08-09 RX ADMIN — ACETAMINOPHEN 650 MG: 325 TABLET, FILM COATED ORAL at 18:13

## 2024-08-09 RX ADMIN — FAMOTIDINE 20 MG: 20 TABLET, FILM COATED ORAL at 09:44

## 2024-08-09 RX ADMIN — Medication 3 MG: at 20:43

## 2024-08-09 RX ADMIN — POLYETHYLENE GLYCOL 3350 17 G: 17 POWDER, FOR SOLUTION ORAL at 09:44

## 2024-08-09 RX ADMIN — SERTRALINE 50 MG: 50 TABLET, FILM COATED ORAL at 09:45

## 2024-08-09 RX ADMIN — METHOCARBAMOL 500 MG: 500 TABLET ORAL at 15:37

## 2024-08-09 RX ADMIN — ACETAMINOPHEN 650 MG: 325 TABLET, FILM COATED ORAL at 12:26

## 2024-08-09 RX ADMIN — OXYCODONE HYDROCHLORIDE 5 MG: 5 TABLET ORAL at 17:01

## 2024-08-09 RX ADMIN — SODIUM CHLORIDE, PRESERVATIVE FREE 10 ML: 5 INJECTION INTRAVENOUS at 09:46

## 2024-08-09 RX ADMIN — ACETAMINOPHEN 650 MG: 325 TABLET, FILM COATED ORAL at 04:34

## 2024-08-09 RX ADMIN — DIPHENHYDRAMINE HYDROCHLORIDE 25 MG: 25 TABLET ORAL at 09:49

## 2024-08-09 RX ADMIN — SODIUM CHLORIDE, PRESERVATIVE FREE 10 ML: 5 INJECTION INTRAVENOUS at 20:44

## 2024-08-09 ASSESSMENT — PAIN SCALES - GENERAL
PAINLEVEL_OUTOF10: 2
PAINLEVEL_OUTOF10: 2
PAINLEVEL_OUTOF10: 0
PAINLEVEL_OUTOF10: 3
PAINLEVEL_OUTOF10: 3
PAINLEVEL_OUTOF10: 6
PAINLEVEL_OUTOF10: 4

## 2024-08-09 ASSESSMENT — PAIN - FUNCTIONAL ASSESSMENT
PAIN_FUNCTIONAL_ASSESSMENT: PREVENTS OR INTERFERES SOME ACTIVE ACTIVITIES AND ADLS

## 2024-08-09 ASSESSMENT — PAIN DESCRIPTION - PAIN TYPE
TYPE: ACUTE PAIN;SURGICAL PAIN
TYPE: SURGICAL PAIN
TYPE: SURGICAL PAIN

## 2024-08-09 ASSESSMENT — PAIN DESCRIPTION - LOCATION
LOCATION: HIP

## 2024-08-09 ASSESSMENT — PAIN DESCRIPTION - ORIENTATION
ORIENTATION: LEFT

## 2024-08-09 ASSESSMENT — PAIN DESCRIPTION - DESCRIPTORS
DESCRIPTORS: ACHING

## 2024-08-09 ASSESSMENT — PAIN DESCRIPTION - FREQUENCY
FREQUENCY: CONTINUOUS

## 2024-08-09 NOTE — PROGRESS NOTES
Ortho Care Plan    Patient had a partial hip replacement on 8/7/2024.  .    Comorbidities Reviewed Yes   Review completed by Scott Sinha RN      Patient has a past medical history of Arthritis, Cancer (HCC), High blood pressure, History of blood transfusion, PONV (postoperative nausea and vomiting), Seasonal allergies, and Thyroid disease.       Commodities addressed via education provided and interdisciplinary team members involved      Patient and/or Family's stated Goal of Care this Admission: reduce pain, increase activity tolerance, improve mobility, Understand the effects of joint replacement on commodities, understand that blood glucose levels may fluctuate and need closer monitoring, and understand use and effect of cough and deep breath/incentive spirometer prior to discharge.     >>For Ortho and Comorbidity documentation on Education, please see Education Tab.

## 2024-08-09 NOTE — PROGRESS NOTES
4 Eyes Skin Assessment     NAME:  Misty Dumont  YOB: 1940  MEDICAL RECORD NUMBER:  [id number]    The patient is being assessed for  Other Ac    I agree that at least one RN has performed a thorough Head to Toe Skin Assessment on the patient. ALL assessment sites listed below have been assessed.      Areas assessed by both nurses:    Head, Face, Ears, Shoulders, Back, Chest, Arms, Elbows, Hands, Sacrum. Buttock, Coccyx, Ischium, and Legs. Feet and Heels        Scattered bruising t/o. Left hip incision DSG CDI.    Does the Patient have a Wound? No noted wound(s)       Ac Prevention initiated by RN: Yes  Wound Care Orders initiated by RN: No    Pressure Injury (Stage 3,4, Unstageable, DTI, NWPT, and Complex wounds) if present, place Wound referral order by RN under : No    New Ostomies, if present place, Ostomy referral order under : No     Nurse 1 eSignature: Electronically signed by Belle Willis RN on 8/9/24 at 3:01 PM EDT    **SHARE this note so that the co-signing nurse can place an eSignature**    Nurse 2 eSignature: Electronically signed by Denice Padilla RN on 8/9/24 at 3:02 PM EDT

## 2024-08-09 NOTE — PROGRESS NOTES
Physical Therapy  Facility/Department: [facility] C5 - MED SURG/ORTHO  Daily Treatment Note  NAME: Misty Dumont  : 1940  MRN: [id number]    Date of Service: 2024    Discharge Recommendations:  Subacute/Skilled Nursing Facility   PT Equipment Recommendations  Equipment Needed: No  Other: TBD per accepting facility    Patient Diagnosis(es): The primary encounter diagnosis was Closed left hip fracture, initial encounter ([facility]). Diagnoses of Displaced fracture of left femoral neck ([facility]) and Preop cardiovascular exam were also pertinent to this visit.    Assessment   Assessment: Pt able to complete bed mobility with max A x 2 and sit<>stand transfer attempt with max A x 2, stedy lift.Pt significantly limited by decreased recall of teachings. Pt continues to benefit from pt at this time, recommend SNF placement upon d/c.  Activity Tolerance: Patient tolerated treatment well;Treatment limited secondary to decreased cognition  Equipment Needed: No  Other: TBD per accepting facility     Plan    Physical Therapy Plan  General Plan: 5-7 times per week  Current Treatment Recommendations: Strengthening;Balance training;Functional mobility training;Transfer training;Endurance training;Gait training;Home exercise program;Safety education & training;Patient/Caregiver education & training;Equipment evaluation, education, & procurement;Therapeutic activities;Co-Treatment     Restrictions  Restrictions/Precautions  Restrictions/Precautions: Fall Risk, General Precautions, Weight Bearing  Lower Extremity Weight Bearing Restrictions  Left Lower Extremity Weight Bearing: Weight Bearing As Tolerated  Position Activity Restriction  Other position/activity restrictions: up with assist, telemetry, no anterior hip px per ortho sx note, jacques     Subjective    Subjective  Subjective: Pt agrees to therapy session.  present.  Pain: Pt denies pain at rest.  Orientation  Overall Orientation Status: Impaired  Orientation Level:

## 2024-08-09 NOTE — PROGRESS NOTES
Occupational Therapy  Facility/Department: [facility] C5 - MED SURG/ORTHO  Daily Treatment Note  NAME: Misty Dumont  : 1940  MRN: [id number]    Date of Service: 2024    Discharge Recommendations:  Subacute/Skilled Nursing Facility       Therapy discharge recommendations are subject to collaboration from the patient’s interdisciplinary healthcare team, including MD and case management recommendations.  Barriers to Home Discharge:   [x] Steps to access home entry or bed/bath   [x] Unable to transfer, ambulate, or propel wheelchair household distances without assist   [x] Limited available assist at home upon discharge    [] Patient or family requests d/c to post-acute facility    [x] Poor cognition/safety awareness for d/c to home alone    [] Unable to maintain ordered weight bearing status    [] Patient with salient signs of long-standing immobility   [x] Decreased independence with ADLs   [x] Increased risk for falls   [] Other:    Patient Diagnosis(es): The primary encounter diagnosis was Closed left hip fracture, initial encounter ([facility]). Diagnoses of Displaced fracture of left femoral neck ([facility]) and Preop cardiovascular exam were also pertinent to this visit.     Assessment    Assessment: Pt demos improved tolerance of OT treatment, pain improved this session and pt able to participate in transfer training. Pt limited at times by cognition and poor recall, requiring frequent re-education. Co-tx collaboration this date with PT staff to safely progress pt toward goals. Pt will have better occupational performance outcomes within a co-treatment than 1:1 session. Pt max A of 2 for all transfer training with both Samantha FLOR and RW. Pt total A for all toileting. Pt functioning below her baseline, continue OT per POC.   Activity Tolerance: Patient tolerated treatment well;Treatment limited secondary to decreased cognition  Discharge Recommendations: Subacute/Skilled Nursing Facility      Plan   Occupational

## 2024-08-09 NOTE — PROGRESS NOTES
ATTENDING ATTESTATION NOTE    I performed a history and physical of the patient and discussed patient's management with the resident. I reviewed the resident's note and agree with the documented findings and plan of care.     Vitals:    08/09/24 1810   BP:    Pulse:    Resp:    Temp:    SpO2: 96%       Physical Exam:    General appearance: No apparent distress, appears stated age and cooperative.  Patient was doing well this morning.  Later in the day patient appeared more confused.  She stated she wants to die.  HEENT: Pupils equal, round, and reactive to light. Conjunctivae/corneas clear.  Neck: Supple, with full range of motion. No jugular venous distention. Trachea midline.  Respiratory:  Normal respiratory effort. Clear to auscultation, bilaterally without Rales/Wheezes/Rhonchi.  Cardiovascular: Regular rate and rhythm with normal S1/S2 without murmurs, rubs or gallops.  Abdomen: Soft, non-tender, non-distended with normal bowel sounds.  Musculoskeletal: Left leg incision dressed.  Skin: Skin color, texture, turgor normal.  No rashes or lesions.  Neurologic:  Neurovascularly intact without any focal sensory/motor deficits. Cranial nerves: II-XII intact, grossly non-focal.  Psychiatric: Alert and oriented    Patient treated for:    1.  Left hip fracture status post fall.  Patient status post hemiarthroplasty.  She states pain is controlled.  2.  Leukocytosis.  Patient with history of B-cell lymphoma.  White count normalized.  Continue to monitor.  3.  Elevated troponin.  Possible demand ischemia.  Cardiology did evaluate.  Patient at high risk for surgery due to advanced age but appears to be compensated.  Echo pending.  No chest pain.  4.  Thyroid enlargement.  Check TSH and T4.  Patient will need thyroid scan.  Thyroid ultrasound ordered.  5.  Electrolyte abnormality.  Will supplement and monitor closely.  6.  Depression.  Patient stating she wanted to die.  She denied suicidal plans.  Psychiatry

## 2024-08-09 NOTE — PROGRESS NOTES
The patients spouse, Miah Dumont's home care company called for a status update to start home care with the patients spouse.     The company is [employer] and the Client Care Supervisor is Michelle Cárdenas [phone].

## 2024-08-09 NOTE — PROGRESS NOTES
Department of Orthopedic Surgery  Physician Assistant   Progress Note    Subjective:       Systemic or Specific Complaints: Appears less confused today, still having pain with therapy but was able to get up to the chair.  Tolerating p.o., voiding.   and neighbor at bedside.    Objective:     Patient Vitals for the past 24 hrs:   BP Temp Temp src Pulse Resp SpO2 Weight   08/09/24 0937 101/67 98.2 °F (36.8 °C) -- 86 12 93 % --   08/09/24 0844 124/81 -- -- 80 -- 95 % --   08/09/24 0301 111/66 98.4 °F (36.9 °C) Oral 70 18 96 % 67.3 kg (148 lb 7.3 oz)   08/08/24 2338 (!) 96/58 98.1 °F (36.7 °C) Oral 70 17 97 % --   08/08/24 2015 111/60 (!) 101.4 °F (38.6 °C) Oral 74 18 96 % --   08/08/24 1243 112/76 97.4 °F (36.3 °C) Axillary 79 16 93 % --   08/08/24 1041 130/73 -- -- 100 -- 95 % --       General: alert, appears stated age, cooperative, and no distress   Wound: Wound clean and dry no evidence of infection., No Erythema, No Drainage, and Positive for Edema   Motion: Painful range of Motion in affected extremity   DVT Exam: No evidence of DVT seen on physical exam.  No cords or calf tenderness.  No significant calf/ankle edema.     Additional exam: Patient seen sitting up in bed at time of interview  Leg lengths equal, right/uninvolved leg baseline varus, rotational alignment of surgical leg neutral  Thigh mild swelling as expected, compartments compressible  Tender to palpation about the anterior thigh  EHL, FHL, gastroc, and anterior tibialis motor intact  Sensation intact to light touch  DP and PT pulses 2+      Data Review  CBC:   Lab Results   Component Value Date/Time    WBC 7.7 08/09/2024 05:26 AM    RBC 2.82 08/09/2024 05:26 AM    HGB 9.0 08/09/2024 05:26 AM    HCT 27.1 08/09/2024 05:26 AM     08/09/2024 05:26 AM       Renal:   Lab Results   Component Value Date/Time     08/09/2024 05:26 AM    K 4.0 08/09/2024 05:26 AM    CL 98 08/09/2024 05:26 AM    CO2 27 08/09/2024 05:26 AM    BUN 16  08/09/2024 05:26 AM    CREATININE 0.7 08/09/2024 05:26 AM    GLUCOSE 114 08/09/2024 05:26 AM    CALCIUM 8.5 08/09/2024 05:26 AM          Assessment:      left hip hemiarthroplasty, anterior approach, POD 2  DOS 8/7/24 by Dr Wood.     Plan:      1:  WBAT to the LLE with assistive device, no hip precautions indicated  2:  Continue Deep venous thrombosis prophylaxis- lovenox 40mg daily for 6 weeks post op  3:  Continue Pain Control  4:  PT/OT  5:  Two doses IV ancef completed post op   6:  Discharge appropriate from orthopedic standpoint. DCP updated, prescriptions printed, signed, placed in soft chart. Follow up with surgeon as scheduled      GABY Fontanez

## 2024-08-09 NOTE — CARE COORDINATION
Chart reviewed day 3. Pt is followed by IM, Ortho and awaiting new Psych consult. Awaiting psych eval for d/c. Writer spoke w/dtr Estela and per request referral placed to the [facility] which is [facility]; Estela is agreeable to continue w/[facility]. Plan to d/c to [facility]; cert approved for SOC today. Pt from home w/spouse prior. Will follow for needs as they arise. Electronically signed by TOM CHAND RN on 8/9/2024 at 1:56 PM

## 2024-08-10 VITALS
RESPIRATION RATE: 16 BRPM | WEIGHT: 148.46 LBS | TEMPERATURE: 99 F | SYSTOLIC BLOOD PRESSURE: 117 MMHG | HEART RATE: 67 BPM | HEIGHT: 63 IN | DIASTOLIC BLOOD PRESSURE: 60 MMHG | OXYGEN SATURATION: 91 % | BODY MASS INDEX: 26.3 KG/M2

## 2024-08-10 PROCEDURE — 2700000000 HC OXYGEN THERAPY PER DAY

## 2024-08-10 PROCEDURE — 6370000000 HC RX 637 (ALT 250 FOR IP): Performed by: STUDENT IN AN ORGANIZED HEALTH CARE EDUCATION/TRAINING PROGRAM

## 2024-08-10 PROCEDURE — 99223 1ST HOSP IP/OBS HIGH 75: CPT | Performed by: PSYCHIATRY & NEUROLOGY

## 2024-08-10 PROCEDURE — 94761 N-INVAS EAR/PLS OXIMETRY MLT: CPT

## 2024-08-10 PROCEDURE — 2580000003 HC RX 258: Performed by: STUDENT IN AN ORGANIZED HEALTH CARE EDUCATION/TRAINING PROGRAM

## 2024-08-10 PROCEDURE — 6370000000 HC RX 637 (ALT 250 FOR IP): Performed by: SPECIALIST/TECHNOLOGIST

## 2024-08-10 PROCEDURE — 6360000002 HC RX W HCPCS: Performed by: STUDENT IN AN ORGANIZED HEALTH CARE EDUCATION/TRAINING PROGRAM

## 2024-08-10 RX ADMIN — POLYETHYLENE GLYCOL 3350 17 G: 17 POWDER, FOR SOLUTION ORAL at 08:40

## 2024-08-10 RX ADMIN — ACETAMINOPHEN 650 MG: 325 TABLET, FILM COATED ORAL at 12:43

## 2024-08-10 RX ADMIN — FAMOTIDINE 20 MG: 20 TABLET, FILM COATED ORAL at 08:39

## 2024-08-10 RX ADMIN — ENOXAPARIN SODIUM 40 MG: 100 INJECTION SUBCUTANEOUS at 08:40

## 2024-08-10 RX ADMIN — LORAZEPAM 0.5 MG: 0.5 TABLET ORAL at 08:42

## 2024-08-10 RX ADMIN — SODIUM CHLORIDE, PRESERVATIVE FREE 10 ML: 5 INJECTION INTRAVENOUS at 08:39

## 2024-08-10 RX ADMIN — ACETAMINOPHEN 650 MG: 325 TABLET, FILM COATED ORAL at 03:37

## 2024-08-10 RX ADMIN — PROPRANOLOL HYDROCHLORIDE 10 MG: 10 TABLET ORAL at 08:40

## 2024-08-10 RX ADMIN — SERTRALINE 50 MG: 50 TABLET, FILM COATED ORAL at 08:40

## 2024-08-10 ASSESSMENT — PAIN DESCRIPTION - LOCATION: LOCATION: HIP

## 2024-08-10 ASSESSMENT — PAIN DESCRIPTION - DESCRIPTORS: DESCRIPTORS: SORE

## 2024-08-10 ASSESSMENT — PAIN DESCRIPTION - ORIENTATION: ORIENTATION: LEFT

## 2024-08-10 ASSESSMENT — PAIN SCALES - GENERAL: PAINLEVEL_OUTOF10: 2

## 2024-08-10 NOTE — CARE COORDINATION
CASE MANAGEMENT DISCHARGE SUMMARY      Discharge to: SNF @ [facility]    Precertification completed: Yes  Hospital Exemption Notification (HENS) completed: Yes    Transportation:    Family/car: no   Medical Transport explained to pt/family. Pt/family voice no agency preference.    Agency used: [facility]   time: 1600   Ambulance form completed: Yes    Confirmed discharge plan with:     Patient: yes per RN     Family:  yes spouse at bedside, also spoke to dtr Estela     [phone]          Facility/Agency, name:  [facility] RANDAL/AVS faxed   Phone number for report to facility: [phone]     RN, name: Avi RN    Per RN pt spouse is at bedside and is confused. Spoke to pt dtr Estela, she states that they are aware he is confused. Neighbor will be here to pick him at 4 and return him to home, and his dtr will be made aware he is returning home to assist with his needs while spouse in SNF.     Note: Discharging nurse to complete RANDAL, reconcile AVS, and place final copy with patient's discharge packet. RN to ensure that written prescriptions for  Level II medications are sent with patient to the facility as per protocol.

## 2024-08-10 NOTE — PLAN OF CARE
Problem: Discharge Planning  Goal: Discharge to home or other facility with appropriate resources  8/10/2024 1416 by Avi Morales RN  Outcome: Progressing  8/10/2024 0249 by Kenneth Woods RN  Outcome: Progressing     Problem: Pain  Goal: Verbalizes/displays adequate comfort level or baseline comfort level  8/10/2024 1416 by Avi Morales RN  Outcome: Progressing  8/10/2024 0249 by Kenneth Woods RN  Outcome: Progressing     Problem: Safety - Adult  Goal: Free from fall injury  8/10/2024 1416 by Avi Morales RN  Outcome: Progressing  8/10/2024 0249 by Kenneth Woods RN  Outcome: Progressing     Problem: ABCDS Injury Assessment  Goal: Absence of physical injury  8/10/2024 1416 by Avi Morales RN  Outcome: Progressing  8/10/2024 0249 by Kenneth Woods RN  Outcome: Progressing     Problem: Skin/Tissue Integrity  Goal: Absence of new skin breakdown  Description: 1.  Monitor for areas of redness and/or skin breakdown  2.  Assess vascular access sites hourly  3.  Every 4-6 hours minimum:  Change oxygen saturation probe site  4.  Every 4-6 hours:  If on nasal continuous positive airway pressure, respiratory therapy assess nares and determine need for appliance change or resting period.  8/10/2024 1416 by Avi Morales RN  Outcome: Progressing  8/10/2024 0249 by Kenneth Woods RN  Outcome: Progressing

## 2024-08-10 NOTE — CONSULTS
Full note dictated.  Met with patient, her  and daughter Gregoria    Dx:  adjustment disorder with depressed mood    Rec:  she is not suicidal and can be discharged when medically stable.  She should continue the meds currently ordered.      Memo Ruelas MD

## 2024-08-10 NOTE — PROGRESS NOTES
V2.0    [facility] Progress Note      Name:  Misty Dumont /Age/Sex: 1940  (83 y.o. female)   MRN & CSN:  [id number] & [id number] Encounter Date/Time: 8/10/2024 12:23 PM EDT   Location:  [id number] PCP: Conrado Anand III, MD     Attending:Chon Colin MD       Hospital Day: 5    Assessment and Recommendations   Misty Dumont is a 83 y.o. 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  Orthopedic consulted, Left hip hemiarthroplasty done on 2024  -Continue Lovenox 40 mg once daily 6 weeks.  - Ortho re-evaluation:             1:  WBAT to the LLE with assistive device, no hip precautions indicated  2:  Continue Deep venous thrombosis prophylaxis- lovenox 40mg daily for 6 weeks post op  3:  Continue Pain Control  4:  PT/OT     Elevated troponin  Cardiology consulted  -Echo (2024): EF of 55 - 60% , Type I diastolic dysfunction with normal LAP. .  -Resume home meds and replete electrolytes as needed.  - Follow up with cardiology clinic in 1 month     Enlarged heterogenous thyroid:  -diagnosed on CT cervical spine on 2024  -Thyroid scan not available, ll refer as out patient  -Thyroid ultrasound :Heterogeneous thyroid gland with a dominant 13 mm TR 3 left thyroid nodule. 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   Will refer to her oncologist UC haem/onc regarding further management    Depression  - Patient stating that she wanted to die. She denied suicidal plans  - Psychiatry consulted      Essential Hypertension:   -continue home medication  -BP monitoring at home      Diet ADULT DIET; Regular   DVT  hours.  BNP: No results for input(s): \"PROBNP\" in the last 72 hours.  UA:  Lab Results   Component Value Date/Time    NITRU Negative 08/07/2024 05:26 AM    COLORU Yellow 08/07/2024 05:26 AM    PHUR 6.0 08/07/2024 05:26 AM    PHUR 6.5 04/28/2022 11:32 PM    WBCUA 20-50 05/15/2019 12:05 PM    RBCUA 3-5 05/15/2019 12:05 PM    MUCUS 1+ 05/15/2019 12:05 PM    BACTERIA Rare 05/15/2019 12:05 PM    CLARITYU Clear 08/07/2024 05:26 AM    LEUKOCYTESUR Negative 08/07/2024 05:26 AM    UROBILINOGEN 0.2 08/07/2024 05:26 AM    BILIRUBINUR SMALL 08/07/2024 05:26 AM    BLOODU Negative 08/07/2024 05:26 AM    GLUCOSEU Negative 08/07/2024 05:26 AM    KETUA Negative 08/07/2024 05:26 AM     Urine Cultures:   Lab Results   Component Value Date/Time    LABURIN  05/15/2019 12:05 PM     <10,000 CFU/ml mixed skin/urogenital anthony. No further workup     Blood Cultures: No results found for: \"BC\"  No results found for: \"BLOODCULT2\"  Organism: No results found for: \"ORG\"      Electronically signed by Pool Thompson DO on 8/10/2024 at 12:23 PM     Pool Thompson DO  PGY-1, Family Medicine  [facility] Program

## 2024-08-10 NOTE — PROGRESS NOTES
Report called into MAGDY Spann at [facility]. All questions and concerns were addressed. Name and number left for callback if needed.

## 2024-08-10 NOTE — PROGRESS NOTES
Patient's spouse at bedside who presents with confusion and agitation.  Spouse was verbally aggressive with staff, telling them to get out of his house.  Writer was informed that family at some point told day shift RN that she needed to make sure the patient's spouse took his med.      Multiple attempts were made to contact family.    Clinical was informed of ongoing situation.      Staff was directed not to enter the room alone, and to maintain safe access to the exit at all times.
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
## 2024-08-12 LAB — PATH INTERP BLD-IMP: NORMAL

## 2024-08-13 ENCOUNTER — TELEPHONE (OUTPATIENT)
Dept: ORTHOPEDIC SURGERY | Age: 84
End: 2024-08-13

## 2024-08-13 NOTE — TELEPHONE ENCOUNTER
Medical Facility Question     Facility [facility]: [facility]  Contact Name: RUBEN  Contact Number: [phone]  Request or Information: REQ LOVENOX UPDATE

## 2024-08-19 LAB — PATH INTERP BLD-IMP: NORMAL

## 2024-08-22 ENCOUNTER — OFFICE VISIT (OUTPATIENT)
Dept: ORTHOPEDIC SURGERY | Age: 84
End: 2024-08-22

## 2024-08-22 VITALS — HEIGHT: 63 IN | WEIGHT: 148 LBS | BODY MASS INDEX: 26.22 KG/M2

## 2024-08-22 DIAGNOSIS — S72.002A CLOSED LEFT HIP FRACTURE, INITIAL ENCOUNTER (HCC): Primary | ICD-10-CM

## 2024-08-22 PROCEDURE — 99024 POSTOP FOLLOW-UP VISIT: CPT | Performed by: STUDENT IN AN ORGANIZED HEALTH CARE EDUCATION/TRAINING PROGRAM

## 2024-08-22 NOTE — PROGRESS NOTES
Chief Complaint  Hip Pain (Po lt hip)      History of Present Illness:  Misty Dumont is a pleasant 83 y.o. female here today for her first postop evaluation regarding her left hip.  She underwent a left hip hemiarthroplasty for femoral neck fracture, date of procedure 8/7/2024.  She is doing very well.  She denies much pain.  She is currently at [facility] and is hoping to go home next week.  She is on Lovenox.  She is in a wheelchair but she is getting up and walking frequently at the facility.           Medical History:  Patient's medications, allergies, past medical, surgical, social and family histories were reviewed and updated as appropriate.    Pertinent items are noted in HPI  Review of systems reviewed from Patient History Form dated on 8/22/24 and available in the patient's chart under the Media tab.       Vital Signs:  There were no vitals filed for this visit.      Constitutional: In no apparent distress. Normal affect. Alert and oriented X3 and is cooperative.       Left hip Examination:    Well-healed surgical incision to the hip.  No signs of infection or drainage.  Patient able to stand on her own power.  Compartments soft. EHL/FHL/TA/GS complex motor intact. Sural, saphenous, superificial/deep peroneal, and plantar nerve distribution sensation grossly intact. Dorsalis pedis/posterior tibial pulses 2+ with BCR.         Radiology:       2 views of the left hip taken in the office today demonstrate a stable hip hemiarthroplasty without complication           Assessment : 83-year-old female 2-weeks status post left hip hemiarthroplasty for femoral neck fracture, date of procedure 8/7/2024    Impression:  Encounter Diagnosis   Name Primary?    Closed left hip fracture, initial encounter ([facility]) Yes       Office Procedures:  Orders Placed This Encounter   Procedures    XR HIP LEFT (2-3 VIEWS)     Standing Status:   Future     Number of Occurrences:   1     Standing Expiration Date:   8/20/2025

## 2024-09-26 ENCOUNTER — OFFICE VISIT (OUTPATIENT)
Dept: ORTHOPEDIC SURGERY | Age: 84
End: 2024-09-26

## 2024-09-26 DIAGNOSIS — S72.002A CLOSED LEFT HIP FRACTURE, INITIAL ENCOUNTER (HCC): Primary | ICD-10-CM

## 2024-09-26 PROCEDURE — 99024 POSTOP FOLLOW-UP VISIT: CPT | Performed by: STUDENT IN AN ORGANIZED HEALTH CARE EDUCATION/TRAINING PROGRAM

## 2024-10-03 ENCOUNTER — HOSPITAL ENCOUNTER (OUTPATIENT)
Dept: PHYSICAL THERAPY | Age: 84
Setting detail: THERAPIES SERIES
Discharge: HOME | End: 2024-10-03
Payer: MEDICARE

## 2024-10-03 PROCEDURE — 97110 THERAPEUTIC EXERCISES: CPT

## 2024-10-03 PROCEDURE — 97161 PT EVAL LOW COMPLEX 20 MIN: CPT

## 2024-10-03 PROCEDURE — 97140 MANUAL THERAPY 1/> REGIONS: CPT

## 2024-10-03 NOTE — PLAN OF CARE
[facility]- Outpatient Rehabilitation and Therapy  [address], [place] [address] office: [phone] fax: [phone]     Physical Therapy Initial Evaluation Certification      Dear Singh Wood DO,    We had the pleasure of evaluating the following patient for physical therapy services at  Outpatient Physical Therapy.  A summary of our findings can be found in the initial assessment below.  This includes our plan of care.  If you have any questions or concerns regarding these findings, please do not hesitate to contact me at the office phone number listed above.  Thank you for the referral.     Physician Signature:_______________________________Date:__________________  By signing above (or electronic signature), therapist’s plan is approved by physician       Physical Therapy: TREATMENT/PROGRESS NOTE   Patient: Misty Dumont (83 y.o. female)   Examination Date: 10/03/2024   :  1940 MRN: [id number]   Visit #: 1   Insurance Allowable Auth Needed   BMN after auth [x]Yes    []No    Insurance: Payor: [facility] MEDICARE / Plan: [facility] MEDICARE ADVANTAGE / Product Type: *No Product type* /   Insurance ID: [id number] - (Medicare Managed)  Secondary Insurance (if applicable):    Treatment Diagnosis: L hip pain M25.561, L hip stiffness M25.662, difficulty walking R26.2, muscle weakness LLE M62.82     Medical Diagnosis:  Closed left hip fracture, initial encounter ([facility]) [S72.002A]   Referring Physician: Singh Wood DO  PCP: Conrado Anand III, MD     Plan of care signed (Y/N):     Date of Patient follow up with Physician:      Plan of Care Report: EVAL today  POC update due: (10 visits /OR AUTH LIMITS, whichever is less)  2024                                             Medical History:  Comorbidities:  Hypertension  Osteoarthritis  Prior Surgeries: lymphoma LLE (resolved per pt)  Other: hx L TKA  Relevant Medical History: pt recently lost her

## 2024-10-08 ENCOUNTER — HOSPITAL ENCOUNTER (OUTPATIENT)
Dept: PHYSICAL THERAPY | Age: 84
Setting detail: THERAPIES SERIES
End: 2024-10-08
Payer: MEDICARE

## 2024-10-10 ENCOUNTER — HOSPITAL ENCOUNTER (OUTPATIENT)
Dept: PHYSICAL THERAPY | Age: 84
Setting detail: THERAPIES SERIES
Discharge: HOME | End: 2024-10-10
Payer: MEDICARE

## 2024-10-10 PROCEDURE — 97140 MANUAL THERAPY 1/> REGIONS: CPT

## 2024-10-10 PROCEDURE — 97110 THERAPEUTIC EXERCISES: CPT

## 2024-10-10 NOTE — FLOWSHEET NOTE
quads/hamstrings/calves to 5/5 to allow for proper functional mobility to enable patient to return to performing standing tasks and light lifting chores .   [] Progressing: [] Met: [] Not Met: [] Adjusted  4. Patient will improve balance and TUG score to <12\" to return to ambulating curb steps and community distances with SPC or no AD to reduce risk of falling.   [] Progressing: [] Met: [] Not Met: [] Adjusted  5. Pt will sleep comfortably on back and her sides without limitation.    [] Progressing: [] Met: [] Not Met: [] Adjusted     Overall Progression Towards Functional goals/ Treatment Progress Update:  [] Patient is progressing as expected towards functional goals listed.    [] Progression is slowed due to complexities/Impairments listed.  [] Progression has been slowed due to co-morbidities.  [x] Plan just implemented, too soon (<30days) to assess goals progression   [] Goals require adjustment due to lack of progress  [] Patient is not progressing as expected and requires additional follow up with physician  [] Other:     TREATMENT PLAN     Frequency/Duration: 2x/week for  10-12  weeks for the following treatment interventions:    Interventions:  Therapeutic Exercise (86129) including: strength training, ROM, and functional mobility  Therapeutic Activities (94206) including: functional mobility training and education.  Neuromuscular Re-education (25178) activation and proprioception, including postural re-education.    Gait Training (15425) for normalization of ambulation patterns and AD training.   Manual Therapy (09378) as indicated to include: Passive Range of Motion and Soft Tissue Mobilization  Modalities as needed that may include: Cryotherapy and Thermal Agents  Patient education on joint protection, postural re-education, activity modification, and progression of HEP    Plan: Cont POC- Continue emphasis/focus on exercise progression, improving proper muscle recruitment and activation/motor control

## 2024-10-15 ENCOUNTER — HOSPITAL ENCOUNTER (OUTPATIENT)
Dept: PHYSICAL THERAPY | Age: 84
Setting detail: THERAPIES SERIES
End: 2024-10-15
Payer: MEDICARE

## 2024-10-17 ENCOUNTER — HOSPITAL ENCOUNTER (OUTPATIENT)
Dept: PHYSICAL THERAPY | Age: 84
Setting detail: THERAPIES SERIES
Discharge: HOME | End: 2024-10-17
Payer: MEDICARE

## 2024-10-17 PROCEDURE — 97140 MANUAL THERAPY 1/> REGIONS: CPT

## 2024-10-17 PROCEDURE — 97110 THERAPEUTIC EXERCISES: CPT

## 2024-10-17 NOTE — FLOWSHEET NOTE
[facility]- Outpatient Rehabilitation and Therapy  [address], [place] [address] office: [phone] fax: [phone]     Physical Therapy: TREATMENT/PROGRESS NOTE   Patient: Misty Dumont (83 y.o. female)   Examination Date: 10/17/2024   :  1940 MRN: [id number]   Visit #: 3   Insurance Allowable Auth Needed   BMN after auth [x]Yes    []No    Insurance: Payor: [facility] MEDICARE / Plan: [facility] MEDICARE ADVANTAGE / Product Type: *No Product type* /   Insurance ID: [id number] - (Medicare Managed)  Secondary Insurance (if applicable):    Treatment Diagnosis: L hip pain M25.561, L hip stiffness M25.662, difficulty walking R26.2, muscle weakness LLE M62.82     Medical Diagnosis:  Closed left hip fracture, initial encounter ([facility]) [S72.002A]   Referring Physician: Singh Wood DO  PCP: Conrado Anand III, MD     Plan of care signed (Y/N):     Date of Patient follow up with Physician:      Plan of Care Report: NO  POC update due: (10 visits /OR AUTH LIMITS, whichever is less)  2024                                             Medical History:  Comorbidities:  Hypertension  Osteoarthritis  Prior Surgeries: lymphoma LLE (resolved per pt)  Other: hx L TKA  Relevant Medical History: pt recently lost her  (2024)                                         Precautions/ Contra-indications: WBAT S/p L hip hemiarthropastly  DOS 24 anterior hip precautions         Latex allergy:  NO  Pacemaker:    NO  Contraindications for Manipulation: recent surgical history (relative)  Date of Surgery: s/p L hip hemiarthroplasty 24 after femoral neck fx sustained 24  Other:hx L TKA    Red Flags:  None    Suicide Screening:   The patient did not verbalize a primary behavioral concern, suicidal ideation, suicidal intent, or demonstrate suicidal behaviors.    Preferred Language for Healthcare:   [x] English       [] other:    SUBJECTIVE EXAMINATION     Patient stated complaint:

## 2024-10-22 ENCOUNTER — HOSPITAL ENCOUNTER (OUTPATIENT)
Dept: PHYSICAL THERAPY | Age: 84
Setting detail: THERAPIES SERIES
Discharge: HOME | End: 2024-10-22
Payer: MEDICARE

## 2024-10-22 PROCEDURE — 97110 THERAPEUTIC EXERCISES: CPT

## 2024-10-22 PROCEDURE — 97140 MANUAL THERAPY 1/> REGIONS: CPT

## 2024-10-22 PROCEDURE — 97112 NEUROMUSCULAR REEDUCATION: CPT

## 2024-10-22 NOTE — FLOWSHEET NOTE
services:  The patient has functional impairments and/or activity limitations and would benefit from continued outpatient therapy services to address the deficits outlined in the patients goals    Return to Play: NA    Prognosis for POC: [x] Good [] Fair  [] Poor    Patient requires continued skilled intervention: [x] Yes  [] No      CHARGE CAPTURE     PT CHARGE GRID   CPT Code (TIMED) minutes # CPT Code (UNTIMED) #     Therex (09455)  25 1  EVAL:LOW (10539 - Typically 20 minutes face-to-face)     Neuromusc. Re-ed (38145) 6 1  Re-Eval (06734)     Manual (21194) 14 1  Estim Unattended (07033)     Ther. Act (17163)    Mech. Traction (15333)     Gait (30910)    Dry Needle 1-2 muscle (90746)     Aquatic Therex (56710)    Dry Needle 3+ muscle (20561)     Iontophoresis (24174)    VASO (25311)     Ultrasound (94388)    Group Therapy (80420)     Estim Attended (01000)    Canalith Repositioning (02922)     Physical Performance Test (22981)         Other:    Other:    Total Timed Code Tx Minutes 45 3       Total Treatment Minutes 50 (rest breaks)        Charge Justification:  (41171) THERAPEUTIC EXERCISE - Provided verbal/tactile cueing for activities related to strengthening, flexibility, endurance, ROM performed to prevent loss of range of motion, maintain or improve muscular strength or increase flexibility, following either an injury or surgery.   (50924) HOME EXERCISE PROGRAM - Reviewed/Progressed HEP activities related to strengthening, flexibility, endurance, ROM performed to prevent loss of range of motion, maintain or improve muscular strength or increase flexibility, following either an injury or surgery.  (80286) MANUAL THERAPY -  Manual therapy techniques, 1 or more regions, each 15 minutes (Mobilization/manipulation, manual lymphatic drainage, manual traction) for the purpose of modulating pain, promoting relaxation,  increasing ROM, reducing/eliminating soft tissue swelling/inflammation/restriction, improving

## 2024-10-24 ENCOUNTER — HOSPITAL ENCOUNTER (OUTPATIENT)
Dept: PHYSICAL THERAPY | Age: 84
Setting detail: THERAPIES SERIES
Discharge: HOME | End: 2024-10-24
Payer: MEDICARE

## 2024-10-24 ENCOUNTER — HOSPITAL ENCOUNTER (OUTPATIENT)
Dept: PHYSICAL THERAPY | Age: 84
Setting detail: THERAPIES SERIES
End: 2024-10-24
Payer: MEDICARE

## 2024-10-24 PROCEDURE — 97112 NEUROMUSCULAR REEDUCATION: CPT

## 2024-10-24 PROCEDURE — 97140 MANUAL THERAPY 1/> REGIONS: CPT

## 2024-10-24 PROCEDURE — 97110 THERAPEUTIC EXERCISES: CPT

## 2024-10-24 NOTE — FLOWSHEET NOTE
[facility]- Outpatient Rehabilitation and Therapy  [address], [place] [address] office: [phone] fax: [phone]     Physical Therapy: TREATMENT/PROGRESS NOTE   Patient: Misty Dumont (83 y.o. female)   Examination Date: 10/24/2024   :  1940 MRN: [id number]   Visit #: 5   Insurance Allowable Auth Needed   BMN after auth [x]Yes    []No    Insurance: Payor: [facility] MEDICARE / Plan: [facility] MEDICARE ADVANTAGE / Product Type: *No Product type* /   Insurance ID: [id number] - (Medicare Managed)  Secondary Insurance (if applicable):    Treatment Diagnosis: L hip pain M25.561, L hip stiffness M25.662, difficulty walking R26.2, muscle weakness LLE M62.82     Medical Diagnosis:  Closed left hip fracture, initial encounter ([facility]) [S72.002A]   Referring Physician: Singh Wood DO  PCP: Conrado Anand III, MD     Plan of care signed (Y/N):     Date of Patient follow up with Physician:      Plan of Care Report: NO  POC update due: (10 visits /OR AUTH LIMITS, whichever is less)  2024                                             Medical History:  Comorbidities:  Hypertension  Osteoarthritis  Prior Surgeries: lymphoma LLE (resolved per pt)  Other: hx L TKA  Relevant Medical History: pt recently lost her  (2024)                                         Precautions/ Contra-indications: WBAT S/p L hip hemiarthropastly  DOS 24 anterior hip precautions         Latex allergy:  NO  Pacemaker:    NO  Contraindications for Manipulation: recent surgical history (relative)  Date of Surgery: s/p L hip hemiarthroplasty 24 after femoral neck fx sustained 24  Other:hx L TKA    Red Flags:  None    Suicide Screening:   The patient did not verbalize a primary behavioral concern, suicidal ideation, suicidal intent, or demonstrate suicidal behaviors.    Preferred Language for Healthcare:   [x] English       [] other:    SUBJECTIVE EXAMINATION     Patient stated complaint:

## 2024-10-29 ENCOUNTER — HOSPITAL ENCOUNTER (OUTPATIENT)
Dept: PHYSICAL THERAPY | Age: 84
Setting detail: THERAPIES SERIES
Discharge: HOME | End: 2024-10-29
Payer: MEDICARE

## 2024-10-29 PROCEDURE — 97112 NEUROMUSCULAR REEDUCATION: CPT

## 2024-10-29 PROCEDURE — 97140 MANUAL THERAPY 1/> REGIONS: CPT

## 2024-10-29 PROCEDURE — 97110 THERAPEUTIC EXERCISES: CPT

## 2024-10-29 NOTE — FLOWSHEET NOTE
Cont POC- Continue emphasis/focus on exercise progression, improving proper muscle recruitment and activation/motor control patterns, increasing ROM, reduce/eliminate soft tissue swelling/inflammation/restriction, static and dynamic balance, and improving postural awareness. Next visit plan to progress weights, add new exercises, progress balance, look to Dry Needle or other manual technique, and continue current phase     Electronically Signed by Alma Delia Lewis, PT, DPT  Date: 10/29/2024     Note: Portions of this note have been templated and/or copied from initial evaluation, reassessments and prior notes for documentation efficiency.    Note: If patient does not return for scheduled/recommended follow up visits, this note will serve as a discharge from care along with the most recent update on progress.    Ortho Evaluation

## 2024-10-31 ENCOUNTER — APPOINTMENT (OUTPATIENT)
Dept: PHYSICAL THERAPY | Age: 84
End: 2024-10-31
Payer: MEDICARE

## 2024-11-05 ENCOUNTER — HOSPITAL ENCOUNTER (OUTPATIENT)
Dept: PHYSICAL THERAPY | Age: 84
Setting detail: THERAPIES SERIES
End: 2024-11-05
Payer: MEDICARE

## 2024-11-06 ENCOUNTER — OFFICE VISIT (OUTPATIENT)
Dept: ORTHOPEDIC SURGERY | Age: 84
End: 2024-11-06

## 2024-11-06 VITALS — BODY MASS INDEX: 26.22 KG/M2 | HEIGHT: 63 IN | WEIGHT: 148 LBS

## 2024-11-06 DIAGNOSIS — S72.002A CLOSED LEFT HIP FRACTURE, INITIAL ENCOUNTER (HCC): Primary | ICD-10-CM

## 2024-11-06 PROCEDURE — 99024 POSTOP FOLLOW-UP VISIT: CPT | Performed by: STUDENT IN AN ORGANIZED HEALTH CARE EDUCATION/TRAINING PROGRAM

## 2024-11-07 ENCOUNTER — HOSPITAL ENCOUNTER (OUTPATIENT)
Dept: PHYSICAL THERAPY | Age: 84
Setting detail: THERAPIES SERIES
End: 2024-11-07
Payer: MEDICARE

## 2024-11-12 ENCOUNTER — HOSPITAL ENCOUNTER (OUTPATIENT)
Dept: PHYSICAL THERAPY | Age: 84
Setting detail: THERAPIES SERIES
Discharge: HOME | End: 2024-11-12
Payer: MEDICARE

## 2024-11-12 PROCEDURE — 97110 THERAPEUTIC EXERCISES: CPT

## 2024-11-12 PROCEDURE — 97116 GAIT TRAINING THERAPY: CPT

## 2024-11-12 PROCEDURE — 97140 MANUAL THERAPY 1/> REGIONS: CPT

## 2024-11-12 NOTE — FLOWSHEET NOTE
[facility]- Outpatient Rehabilitation and Therapy  [address], [place] [address] office: [phone] fax: [phone]     Physical Therapy: TREATMENT/PROGRESS NOTE   Patient: Misty Dumont (83 y.o. female)   Examination Date: 2024   :  1940 MRN: [id number]   Visit #: 7   Insurance Allowable Auth Needed   BMN after auth [x]Yes    []No    Insurance: Payor: [facility] MEDICARE / Plan: [facility] MEDICARE ADVANTAGE / Product Type: *No Product type* /   Insurance ID: [id number] - (Medicare Managed)  Secondary Insurance (if applicable):    Treatment Diagnosis: L hip pain M25.561, L hip stiffness M25.662, difficulty walking R26.2, muscle weakness LLE M62.82     Medical Diagnosis:  Closed left hip fracture, initial encounter ([facility]) [S72.002A]   Referring Physician: Singh Wood DO  PCP: Conrado Anand III, MD     Plan of care signed (Y/N): Y    Date of Patient follow up with Physician:      Plan of Care Report: YES, Date Range for this report:  to 24  POC update due: (10 visits /OR AUTH LIMITS, whichever is less)  2024                                             Medical History:  Comorbidities:  Hypertension  Osteoarthritis  Prior Surgeries: lymphoma LLE (resolved per pt)  Other: hx L TKA  Relevant Medical History: pt recently lost her  (2024)                                         Precautions/ Contra-indications: WBAT S/p L hip hemiarthropastly  DOS 24 anterior hip precautions         Latex allergy:  NO  Pacemaker:    NO  Contraindications for Manipulation: recent surgical history (relative)  Date of Surgery: s/p L hip hemiarthroplasty 24 after femoral neck fx sustained 24  Other:hx L TKA    Red Flags:  None    Suicide Screening:   The patient did not verbalize a primary behavioral concern, suicidal ideation, suicidal intent, or demonstrate suicidal behaviors.    Preferred Language for Healthcare:   [x] English       []

## 2024-11-14 ENCOUNTER — HOSPITAL ENCOUNTER (OUTPATIENT)
Dept: PHYSICAL THERAPY | Age: 84
Setting detail: THERAPIES SERIES
Discharge: HOME | End: 2024-11-14
Payer: MEDICARE

## 2024-11-14 ENCOUNTER — OFFICE VISIT (OUTPATIENT)
Dept: ORTHOPEDIC SURGERY | Age: 84
End: 2024-11-14

## 2024-11-14 VITALS — WEIGHT: 148 LBS | HEIGHT: 63 IN | BODY MASS INDEX: 26.22 KG/M2

## 2024-11-14 DIAGNOSIS — M25.552 LEFT HIP PAIN: Primary | ICD-10-CM

## 2024-11-14 PROCEDURE — 97110 THERAPEUTIC EXERCISES: CPT

## 2024-11-14 NOTE — PROGRESS NOTES
Chief Complaint  Hip Pain (CK LT hip)      History of Present Illness:  Patient is just over her 3-month evaluation from left Rodriguez hip arthroplasty patient noted to have another fall.  Patient notes that she was getting out of her car and her shoe stuck on the pavement resulting in her falling forward and landing directly on her side she notes pain on the lateral aspect of the hip notes some difficulty with walking typically utilizing a walker to help with ambulation presents today in wheelchair with son she notes that she is still able to lift the leg with little difficulty just pain on the lateral aspect.  Concerned that she created damage to the hip prosthesis.  Denies numbness burning tingling radiating pains down the leg.  Has been doing physical therapy at the [place] location    Prior HPI 11/6/2024  The patient is here for 3-month evaluation regarding her left hip hemiarthroplasty.  Overall she has been doing well but she did have a fall onto the hip recently 2 weeks ago.  She is a little sore there.  She is working with therapy.    Prior HPI 9/26/2024:  The patient is here for repeat evaluation regarding her left hip.  The patient is 6 weeks out from her left hip hemiarthroplasty.  The patient is walking with a cane.  She is doing very well.  She reports a little bit of discomfort but is happy with her progress.  She is finishing up home physical therapy.    Prior HPI 8/22/2024:  Misty Dumont is a pleasant 83 y.o. female here today for her first postop evaluation regarding her left hip.  She underwent a left hip hemiarthroplasty for femoral neck fracture, date of procedure 8/7/2024.  She is doing very well.  She denies much pain.  She is currently at [employer] and is hoping to go home next week.  She is on Lovenox.  She is in a wheelchair but she is getting up and walking frequently at the facility.           Medical History:  Patient's medications, allergies, past medical, surgical, social and

## 2024-11-14 NOTE — FLOWSHEET NOTE
lifting chores .   [x] Progressing:slowly, limited HEP compliance and attendance in PT d/t grief/recent move [] Met: [] Not Met: [] Adjusted  4. Patient will improve balance and TUG score to <12\" to return to ambulating curb steps and community distances with SPC or no AD to reduce risk of falling.   [] Progressing: [] Met: [x] Not Met: [] Adjusted  5. Pt will sleep comfortably on back and her sides without limitation.    [x] Progressing: [] Met: [] Not Met: [] Adjusted     Overall Progression Towards Functional goals/ Treatment Progress Update:  [] Patient is progressing as expected towards functional goals listed.    [x] Progression is slowed due to complexities/Impairments listed.  [] Progression has been slowed due to co-morbidities.  [] Plan just implemented, too soon (<30days) to assess goals progression   [] Goals require adjustment due to lack of progress  [] Patient is not progressing as expected and requires additional follow up with physician  [] Other:     TREATMENT PLAN     Frequency/Duration: 2x/week for  10-12  weeks for the following treatment interventions:    Interventions:  Therapeutic Exercise (20478) including: strength training, ROM, and functional mobility  Therapeutic Activities (55665) including: functional mobility training and education.  Neuromuscular Re-education (86535) activation and proprioception, including postural re-education.    Gait Training (47254) for normalization of ambulation patterns and AD training.   Manual Therapy (15307) as indicated to include: Passive Range of Motion and Soft Tissue Mobilization  Modalities as needed that may include: Cryotherapy and Thermal Agents  Patient education on joint protection, postural re-education, activity modification, and progression of HEP    Plan: Hold pending MD visit; pending xray.     Electronically Signed by Alma Delia Lewis, PT, DPT  Date: 11/14/2024     Note: Portions of this note have been templated and/or copied from

## 2024-11-19 ENCOUNTER — HOSPITAL ENCOUNTER (OUTPATIENT)
Dept: PHYSICAL THERAPY | Age: 84
Setting detail: THERAPIES SERIES
Discharge: HOME | End: 2024-11-19
Payer: MEDICARE

## 2024-11-19 PROCEDURE — 97110 THERAPEUTIC EXERCISES: CPT

## 2024-11-19 PROCEDURE — 97140 MANUAL THERAPY 1/> REGIONS: CPT

## 2024-11-19 NOTE — FLOWSHEET NOTE
Not Met: [] Adjusted    Therapist goals for Patient:   Short Term Goals: To be achieved in: 2 weeks  1. Independent in HEP and progression per patient tolerance, in order to prevent re-injury.   [] Progressing: [] Met: [x] Not Met: [] Adjusted  2. Patient will have a decrease in pain to <3/10 to facilitate improvement in movement, function, and ADLs as indicated by Functional Deficits.  [x] Progressing: [] Met: [] Not Met: [] Adjusted    Long Term Goals: To be achieved in: 10-12 weeks  1. Disability index score of 21% or less for the WOMAC to assist with reaching prior level of function with activities such as self care/IADLs and household and community mobility.  [x] Progressing: [] Met: [] Not Met: [] Adjusted  2. Patient will demonstrate increased AROM of L hip flexion to 0-110 degrees, HS length >75 deg SLR, - Justus test for psoas and rectus tightness without pain to allow for proper joint functioning to enable patient to pain-free lower body dressing.   [x] Progressing: [] Met: [] Not Met: [] Adjusted  3. Patient will demonstrate increased Strength of L hip flexion, extension, abduction to at least 4/5 throughout without pain and quads/hamstrings/calves to 5/5 to allow for proper functional mobility to enable patient to return to performing standing tasks and light lifting chores .   [x] Progressing:slowly, limited HEP compliance and attendance in PT d/t grief/recent move [] Met: [] Not Met: [] Adjusted  4. Patient will improve balance and TUG score to <12\" to return to ambulating curb steps and community distances with SPC or no AD to reduce risk of falling.   [] Progressing: [] Met: [x] Not Met: [] Adjusted  5. Pt will sleep comfortably on back and her sides without limitation.    [x] Progressing: [] Met: [] Not Met: [] Adjusted     Overall Progression Towards Functional goals/ Treatment Progress Update:  [] Patient is progressing as expected towards functional goals listed.    [x] Progression is slowed due

## 2024-11-21 ENCOUNTER — APPOINTMENT (OUTPATIENT)
Dept: PHYSICAL THERAPY | Age: 84
End: 2024-11-21
Payer: MEDICARE

## 2024-11-22 ENCOUNTER — APPOINTMENT (OUTPATIENT)
Dept: PHYSICAL THERAPY | Age: 84
End: 2024-11-22
Payer: MEDICARE

## 2024-11-26 ENCOUNTER — APPOINTMENT (OUTPATIENT)
Dept: PHYSICAL THERAPY | Age: 84
End: 2024-11-26
Payer: MEDICARE

## 2024-11-29 ENCOUNTER — APPOINTMENT (OUTPATIENT)
Dept: PHYSICAL THERAPY | Age: 84
End: 2024-11-29
Payer: MEDICARE

## (undated) DEVICE — STERILE PVP: Brand: MEDLINE INDUSTRIES, INC.

## (undated) DEVICE — BIPOLAR SEALER 23-112-1 AQM 6.0: Brand: AQUAMANTYS ®

## (undated) DEVICE — BOOT POS LEG DEMAYO

## (undated) DEVICE — PEEL-AWAY TOGA, 2X LARGE: Brand: FLYTE

## (undated) DEVICE — BOWL AND CEMENT CARTRIDGE WITH BREAKAWAY FEMORAL NOZZLE AND MEDIUM PRESSURIZER: Brand: ACM

## (undated) DEVICE — BLADE SURG SAW S STL NAR OSC W/ SERR EDGE DISP

## (undated) DEVICE — PIN GUIDE ORTHO 3.2X89MM FLTD DISP PK OF 4 PER PATIENT

## (undated) DEVICE — SUTURE VICRYL + SZ 2-0 L36IN ABSRB UD L36MM CT-1 1/2 CIR VCP945H

## (undated) DEVICE — COVER LT HNDL PLAS RIG 2 PER PK

## (undated) DEVICE — 450 ML BOTTLE OF 0.05% CHLORHEXIDINE GLUCONATE IN 99.95% STERILE WATER FOR IRRIGATION, USP AND APPLICATOR.: Brand: IRRISEPT ANTIMICROBIAL WOUND LAVAGE

## (undated) DEVICE — CORD RETRCT SIL

## (undated) DEVICE — SURGICAL PROCEDURE PACK RESTORIS MCK CAPMAKOPFJ] MAKO]

## (undated) DEVICE — SET CVR FT AND 8IN UPR

## (undated) DEVICE — HANDPIECE SET WITH HIGH FLOW TIP AND SUCTION TUBE: Brand: INTERPULSE

## (undated) DEVICE — ADHESIVE SKIN CLOSURE WND 8.661X1.5 IN 22 CM LIQUIBAND SECUR

## (undated) DEVICE — PACK PROCEDURE SURG HIP PIN TROCHANTERIC FEM NAIL CDS

## (undated) DEVICE — KIT TRK KNEE PROC VIZADISC

## (undated) DEVICE — SKIN AFFIX SURG ADHESIVE 72/CS 0.55ML: Brand: MEDLINE

## (undated) DEVICE — HOOD, PEEL-AWAY: Brand: FLYTE

## (undated) DEVICE — SHEET,DRAPE,53X77,STERILE: Brand: MEDLINE

## (undated) DEVICE — PENCIL SMK EVAC ALL IN 1 DSGN ENH VISIBILITY IMPROVED AIR

## (undated) DEVICE — PIN FIX L140MM DIA4MM BNE

## (undated) DEVICE — OPTIFOAM GENTLE SA, POSTOP, 4X10: Brand: MEDLINE

## (undated) DEVICE — CUTTER SURG OD42MM PAT KNEE DISP FOR RM SYS XCELERATE

## (undated) DEVICE — SYSTEM SKIN CLSR 22CM DERMBND PRINEO

## (undated) DEVICE — KIT DRP FOR RIO ROBOTIC ARM ASST SYS

## (undated) DEVICE — 35 ML SYRINGE LUER-LOCK TIP: Brand: MONOJECT

## (undated) DEVICE — GLOVE SURG SZ 65 THK91MIL LTX FREE SYN POLYISOPRENE

## (undated) DEVICE — DRESSING FOAM W4XL12IN SIL RECT ADH WTRPRF FLM BK W/ BORD

## (undated) DEVICE — Device

## (undated) DEVICE — PIN BNE FIX L140MM DIA3.2MM SELF DRL

## (undated) DEVICE — GLOVE ORANGE PI 8   MSG9080

## (undated) DEVICE — 19 IN KNEE IMMOBILIZER: Brand: DEROYAL

## (undated) DEVICE — SYRINGE MED 10ML LUERLOCK TIP W/O SFTY DISP

## (undated) DEVICE — SUTURE MONOCRYL SZ 3-0 L18IN ABSRB UD L19MM PS-2 3/8 CIR PRIM Y497G

## (undated) DEVICE — BLADE SURG SAW STD S STL OSC W/ SERR EDGE DISP

## (undated) DEVICE — 3M™ TEGADERM™ TRANSPARENT FILM DRESSING FRAME STYLE WITH BORDER, 1616, 4 IN X 4-3/4 IN (10 CM X 12 CM), 50/CT 4CT/CASE: Brand: 3M™ TEGADERM™

## (undated) DEVICE — ELECTRODE BLDE L4IN NONINSULATED EDGE

## (undated) DEVICE — DUAL CUT SAGITTAL BLADE

## (undated) DEVICE — ZIMMER® STERILE DISPOSABLE TOURNIQUET CUFF WITH PLC, DUAL PORT, SINGLE BLADDER, 30 IN. (76 CM)

## (undated) DEVICE — SUTURE ABSORBABLE MONOFILAMENT 1 CTX 36 CM 48 MM VIO PDS +

## (undated) DEVICE — KIT INT FIX FEM TIB CKPT MAKOPLASTY

## (undated) DEVICE — SUTURE ETHIBOND EXCEL SZ 2 L30IN NONABSORBABLE GRN L40MM V-37 MX69G

## (undated) DEVICE — PIN FIX L170MM DIA4MM BNE SELF DRL

## (undated) DEVICE — STANDARD HYPODERMIC NEEDLE,POLYPROPYLENE HUB: Brand: MONOJECT

## (undated) DEVICE — GOWN,REINF,POLY,AURORA,XLNG/XXL,STRL: Brand: MEDLINE

## (undated) DEVICE — GLOVE ORTHO 7 1/2   MSG9475

## (undated) DEVICE — SOLUTION IV IRRIG POUR BRL 0.9% SODIUM CHL 2F7124

## (undated) DEVICE — PIN BONE FIX L110MM DIA3.2MM

## (undated) DEVICE — GLOVE SURG SZ 8 L12IN FNGR THK79MIL GRN LTX FREE

## (undated) DEVICE — GLOVE ORANGE PI 8 1/2   MSG9085

## (undated) DEVICE — GLOVE ORTHO 8   MSG9480

## (undated) DEVICE — CRADLE POS 3X5X24IN RASPBERRY ARM PRONE FOAM DISP

## (undated) DEVICE — SYRINGE, LUER LOCK, 30ML: Brand: MEDLINE

## (undated) DEVICE — SOLUTION IRRIG 2000ML 0.9% SOD CHL USP UROMATIC PLAS CONT